# Patient Record
Sex: FEMALE | Race: WHITE | NOT HISPANIC OR LATINO | Employment: OTHER | URBAN - METROPOLITAN AREA
[De-identification: names, ages, dates, MRNs, and addresses within clinical notes are randomized per-mention and may not be internally consistent; named-entity substitution may affect disease eponyms.]

---

## 2017-01-25 ENCOUNTER — OFFICE VISIT (OUTPATIENT)
Dept: CARDIOLOGY | Facility: MEDICAL CENTER | Age: 62
End: 2017-01-25
Payer: COMMERCIAL

## 2017-01-25 VITALS
DIASTOLIC BLOOD PRESSURE: 80 MMHG | HEIGHT: 59 IN | SYSTOLIC BLOOD PRESSURE: 134 MMHG | HEART RATE: 72 BPM | BODY MASS INDEX: 29.84 KG/M2 | WEIGHT: 148 LBS

## 2017-01-25 DIAGNOSIS — Z95.1 S/P CABG X 3: ICD-10-CM

## 2017-01-25 DIAGNOSIS — I21.4 NSTEMI (NON-ST ELEVATED MYOCARDIAL INFARCTION) (HCC): ICD-10-CM

## 2017-01-25 DIAGNOSIS — I73.9 CLAUDICATION (HCC): ICD-10-CM

## 2017-01-25 DIAGNOSIS — E78.2 HYPERLIPIDEMIA, MIXED: ICD-10-CM

## 2017-01-25 DIAGNOSIS — I25.10 CORONARY ARTERIOSCLEROSIS: ICD-10-CM

## 2017-01-25 DIAGNOSIS — I10 ESSENTIAL HYPERTENSION, BENIGN: ICD-10-CM

## 2017-01-25 DIAGNOSIS — R09.89 BRUIT OF LEFT CAROTID ARTERY: ICD-10-CM

## 2017-01-25 DIAGNOSIS — I10 ESSENTIAL HYPERTENSION: ICD-10-CM

## 2017-01-25 PROCEDURE — 99214 OFFICE O/P EST MOD 30 MIN: CPT | Performed by: INTERNAL MEDICINE

## 2017-01-25 RX ORDER — ATORVASTATIN CALCIUM 40 MG/1
40 TABLET, FILM COATED ORAL DAILY
Qty: 90 TAB | Refills: 4 | Status: SHIPPED | OUTPATIENT
Start: 2017-01-25 | End: 2018-01-25 | Stop reason: SDUPTHER

## 2017-01-25 ASSESSMENT — ENCOUNTER SYMPTOMS
MYALGIAS: 0
DIZZINESS: 0
COUGH: 0
WEIGHT LOSS: 0
BRUISES/BLEEDS EASILY: 0
PALPITATIONS: 0
ABDOMINAL PAIN: 0
LOSS OF CONSCIOUSNESS: 0
NAUSEA: 0
DEPRESSION: 0
HEARTBURN: 0
NERVOUS/ANXIOUS: 0
SHORTNESS OF BREATH: 0
INSOMNIA: 0

## 2017-01-25 NOTE — MR AVS SNAPSHOT
"        Malena Dillon Michi   2017 2:00 PM   Office Visit   MRN: [id number]    Department:  [facility]   Dept Phone:  [phone]    Description:  Female : 1955   Provider:  Ana Muller M.D.           Reason for Visit     Follow-Up           Allergies as of 2017     No Known Allergies      You were diagnosed with     NSTEMI (non-ST elevated myocardial infarction) (CMS-HCC)   [516484]       Essential hypertension, benign   [401.1.ICD-9-CM]       Coronary arteriosclerosis   [396339]       Hyperlipidemia, mixed   [342348]       Bruit of left carotid artery   [038553]       Claudication (CMS-HCC)   [131881]       Essential hypertension   [5588431]       S/P CABG x 3   [045815]         Vital Signs     Blood Pressure Pulse Height Weight Body Mass Index Smoking Status    134/80 mmHg 72 1.499 m (4' 11.02\") 67.132 kg (148 lb) 29.88 kg/m2 Never Smoker       Basic Information     Date Of Birth Sex Race Ethnicity Preferred Language    1955 Female White Non- English      Your appointments     2017 10:15 AM   ECHO with ECHO [facility], [facility] EXAM 12   ECHOCARDIOLOGY [facility] ([facility])    [address] [address]   [phone]           No prep            2017 12:15 PM   CAROTID DUPLEX with VASCULAR LAB [facility], [facility] EXAM 8   NON-INVASIVE LAB [facility] ([facility])    [address] [address]   [phone]           No prep            2017  1:15 PM   LOWER EXTREMITY VENOUS DUPLEX with VASCULAR LAB [facility], [facility] EXAM 6   NON-INVASIVE LAB [facility] ([facility])    [address] [address]   [phone]           No prep              Problem List              ICD-10-CM Priority Class Noted - Resolved    NSTEMI (non-ST elevated myocardial infarction), med managment  I21.4   2014 - Present    Essential hypertension, benign I10   2014 - Present    Coronary arteriosclerosis I25.10   2014 - Present    Hyperlipidemia, mixed E78.2   2014 - Present   " S/P CABG x 3 Z95.1   11/21/2014 - Present    Bruit of left carotid artery R09.89   1/25/2017 - Present      Health Maintenance        Date Due Completion Dates    IMM DTaP/Tdap/Td Vaccine (1 - Tdap) 7/29/1973 ---    PAP SMEAR 7/29/1975 ---    COLONOSCOPY 7/29/2004 ---    IMM ZOSTER VACCINE 7/29/2014 ---    IMM INFLUENZA (1) 9/1/2016 ---    MAMMOGRAM 12/7/2017 12/7/2016, 11/11/2015, 10/3/2014, 9/27/2013, 9/21/2012, 9/15/2011, 9/7/2011, 8/31/2010, 8/28/2009, 8/28/2009, 8/22/2008, 8/22/2008, 8/17/2007, 8/17/2007, 8/16/2006, 8/16/2006, 6/27/2005            Current Immunizations     No immunizations on file.      Below and/or attached are the medications your provider expects you to take. Review all of your home medications and newly ordered medications with your provider and/or pharmacist. Follow medication instructions as directed by your provider and/or pharmacist. Please keep your medication list with you and share with your provider. Update the information when medications are discontinued, doses are changed, or new medications (including over-the-counter products) are added; and carry medication information at all times in the event of emergency situations     Allergies:  No Known Allergies          Medications  Valid as of: January 25, 2017 -  2:46 PM    Generic Name Brand Name Tablet Size Instructions for use    Aspirin (Tab) aspirin 81 MG Take 81 mg by mouth every day.        Atorvastatin Calcium (Tab) LIPITOR 40 MG Take 1 Tab by mouth every day.        Levothyroxine Sodium (Tab) SYNTHROID 88 MCG Take 88 mcg by mouth every morning before breakfast.        Metoprolol Tartrate (Tab) LOPRESSOR 25 MG Take 0.5 Tabs by mouth 2 Times a Day.        .                 Medicines prescribed today were sent to:     [facility] [address] [place] [address] - [address] [address]    Phone: [phone] Fax: [phone]    Open 24 Hours?: No    [facility] [address]  [facility]    [address] [facility] [address]    Phone: [phone] Fax: [phone]    Open 24 Hours?: No      Medication refill instructions:       If your prescription bottle indicates you have medication refills left, it is not necessary to call your provider’s office. Please contact your pharmacy and they will refill your medication.    If your prescription bottle indicates you do not have any refills left, you may request refills at any time through one of the following ways: The online Teros system (except Urgent Care), by calling your provider’s office, or by asking your pharmacy to contact your provider’s office with a refill request. Medication refills are processed only during regular business hours and may not be available until the next business day. Your provider may request additional information or to have a follow-up visit with you prior to refilling your medication.   *Please Note: Medication refills are assigned a new Rx number when refilled electronically. Your pharmacy may indicate that no refills were authorized even though a new prescription for the same medication is available at the pharmacy. Please request the medicine by name with the pharmacy before contacting your provider for a refill.        Your To Do List     Future Labs/Procedures Complete By Expires    Carotid Duplex  As directed 7/25/2017    Echocardiogram Comp w/o Cont  As directed 1/25/2018    US-OLAF MULTI LEVEL BILAT  As directed 1/25/2018         Teros Access Code: [email or web address]  Expires: 2/24/2017  2:46 PM    Teros  A secure, online tool to manage your health information     [employer]’s Teros® is a secure, online tool that connects you to your personalized health information from the privacy of your home -- day or night - making it very easy for you to manage your healthcare. Once the activation process is completed, you can even access your medical information using the Teros  dakota, which is available for free in the Apple Dakota store or Google Play store.     Rhythm NewMedia provides the following levels of access (as shown below):   My Chart Features   [facility] Primary Care Doctor [facility]  Specialists [facility]  Urgent  Care Non-[facility]  Primary Care  Doctor   Email your healthcare team securely and privately 24/7 X X X    Manage appointments: schedule your next appointment; view details of past/upcoming appointments X      Request prescription refills. X      View recent personal medical records, including lab and immunizations X X X X   View health record, including health history, allergies, medications X X X X   Read reports about your outpatient visits, procedures, consult and ER notes X X X X   See your discharge summary, which is a recap of your hospital and/or ER visit that includes your diagnosis, lab results, and care plan. X X       How to register for Rhythm NewMedia:  1. Go to  [email or web address].  2. Click on the Sign Up Now box, which takes you to the New Member Sign Up page. You will need to provide the following information:  a. Enter your Rhythm NewMedia Access Code exactly as it appears at the top of this page. (You will not need to use this code after you’ve completed the sign-up process. If you do not sign up before the expiration date, you must request a new code.)   b. Enter your date of birth.   c. Enter your home email address.   d. Click Submit, and follow the next screen’s instructions.  3. Create a Rhythm NewMedia ID. This will be your Rhythm NewMedia login ID and cannot be changed, so think of one that is secure and easy to remember.  4. Create a Rhythm NewMedia password. You can change your password at any time.  5. Enter your Password Reset Question and Answer. This can be used at a later time if you forget your password.   6. Enter your e-mail address. This allows you to receive e-mail notifications when new information is available in Rhythm NewMedia.  7. Click Sign Up. You can now view your health  information.    For assistance activating your MMIS account, call [phone]

## 2017-01-25 NOTE — PROGRESS NOTES
Subjective:   Malena Borden is a 61 y.o. female who presents today in follow-up in regards to her coronary disease with a small non-STEMI in  remedied by open-heart surgery    She is transferring her care from the cardiologist she use to follow-up with as he is no longer with us    Still working full-time at a [employer].  Secondhand smoke. No setbacks and very compliant with her medications    Past Medical History   Diagnosis Date   • Hyperlipidemia    • Thyroid disease    • CAD (coronary artery disease) 2014     Multivessel CAD   • Heart attack (CMS-HCC) 2014     NSTEMI   • Hypertension    • Indigestion      Past Surgical History   Procedure Laterality Date   • Tubal ligation     • Pr  delivery only     • Cardiac cath  2014   • Recovery  10/20/2014     Performed by Cath-Recovery Surgery at SURGERY SAME DAY [facility] ORS   • Multiple coronary artery bypass endo vein harvest  10/30/2014     Performed by Mando Vieira M.D. at SURGERY [facility] ORS     Family History   Problem Relation Age of Onset   • Lung Disease Mother    • Lung Disease Brother      History   Smoking status   • Never Smoker    Smokeless tobacco   • Never Used     No Known Allergies  Outpatient Encounter Prescriptions as of 2017   Medication Sig Dispense Refill   • metoprolol (LOPRESSOR) 25 MG Tab Take 0.5 Tabs by mouth 2 Times a Day. 90 Tab 3   • atorvastatin (LIPITOR) 40 MG Tab Take 1 Tab by mouth every day. 90 Tab 3   • aspirin 81 MG tablet Take 81 mg by mouth every day.     • levothyroxine (SYNTHROID) 88 MCG TABS Take 88 mcg by mouth every morning before breakfast.       No facility-administered encounter medications on file as of 2017.     Review of Systems   Constitutional: Negative for weight loss and malaise/fatigue.   Respiratory: Negative for cough and shortness of breath.    Cardiovascular: Negative for chest pain, palpitations and leg swelling.   Gastrointestinal: Negative for heartburn,  "nausea and abdominal pain.   Musculoskeletal: Negative for myalgias.   Neurological: Negative for dizziness and loss of consciousness.   Endo/Heme/Allergies: Does not bruise/bleed easily.   Psychiatric/Behavioral: Negative for depression. The patient is not nervous/anxious and does not have insomnia.    All other systems reviewed and are negative.       Objective:   /80 mmHg  Pulse 72  Ht 1.499 m (4' 11.02\")  Wt 67.132 kg (148 lb)  BMI 29.88 kg/m2    Physical Exam   Constitutional: She is oriented to person, place, and time. She appears well-nourished.   Eyes: EOM are normal.   Neck: No JVD present.   Cardiovascular: Normal rate and regular rhythm.    Well-healed sternal scar   Pulmonary/Chest: Breath sounds normal.   Abdominal: Bowel sounds are normal.   Neurological: She is alert and oriented to person, place, and time. No cranial nerve deficit.   Skin: Skin is warm and dry.   Psychiatric: She has a normal mood and affect. Her behavior is normal.       Assessment:     1. NSTEMI (non-ST elevated myocardial infarction), med managment   Echocardiogram Comp w/o Cont   2. Essential hypertension, benign     3. Coronary arteriosclerosis     4. Hyperlipidemia, mixed     5. Bruit of left carotid artery  Carotid Duplex   6. Claudication (CMS-[facility])  US-OLAF MULTI LEVEL BILAT       Medical Decision Making:  Today's Assessment / Status / Plan:     I spent more than 25 minutes going over her chart with her as well as her images including her echo and carotid ultrasound. She is doing exceedingly well without limitations. She is very active. She does complain of some nocturnal leg cramps that do not sound like claudication    Carotid duplex to follow her mild carotid disease, lipids at goal on a statin. Labs reviewed from June. Repeat annually    Coronary disease, echo as above. EF was normal in 2014. Aspirin beta blocker and statin    TSH was normal in the summer with her primary care    In regards to leg cramps we'll " check an OLAF    RTC 6-12 months, sooner with concerns

## 2017-01-25 NOTE — Clinical Note
[facility]   [address]  [place] [address]  Phone: [phone]  Fax: [phone]              Malena Borden  1955    Encounter Date: 2017    Ana Muller M.D.          PROGRESS NOTE:  Subjective:   Malena Borden is a 61 y.o. female who presents today in follow-up in regards to her coronary disease with a small non-STEMI in  remedied by open-heart surgery    She is transferring her care from the cardiologist she use to follow-up with as he is no longer with us    Still working full-time at a [employer].  Secondhand smoke. No setbacks and very compliant with her medications    Past Medical History   Diagnosis Date   • Hyperlipidemia    • Thyroid disease    • CAD (coronary artery disease) 2014     Multivessel CAD   • Heart attack (CMS-HCC) 2014     NSTEMI   • Hypertension    • Indigestion      Past Surgical History   Procedure Laterality Date   • Tubal ligation     • Pr  delivery only     • Cardiac cath  2014   • Recovery  10/20/2014     Performed by Cath-Recovery Surgery at SURGERY SAME DAY [facility] ORS   • Multiple coronary artery bypass endo vein harvest  10/30/2014     Performed by Mando Vieira M.D. at SURGERY [facility]     Family History   Problem Relation Age of Onset   • Lung Disease Mother    • Lung Disease Brother      History   Smoking status   • Never Smoker    Smokeless tobacco   • Never Used     No Known Allergies  Outpatient Encounter Prescriptions as of 2017   Medication Sig Dispense Refill   • metoprolol (LOPRESSOR) 25 MG Tab Take 0.5 Tabs by mouth 2 Times a Day. 90 Tab 3   • atorvastatin (LIPITOR) 40 MG Tab Take 1 Tab by mouth every day. 90 Tab 3   • aspirin 81 MG tablet Take 81 mg by mouth every day.     • levothyroxine (SYNTHROID) 88 MCG TABS Take 88 mcg by mouth every morning before breakfast.       No facility-administered encounter medications on file as of  "1/25/2017.     Review of Systems   Constitutional: Negative for weight loss and malaise/fatigue.   Respiratory: Negative for cough and shortness of breath.    Cardiovascular: Negative for chest pain, palpitations and leg swelling.   Gastrointestinal: Negative for heartburn, nausea and abdominal pain.   Musculoskeletal: Negative for myalgias.   Neurological: Negative for dizziness and loss of consciousness.   Endo/Heme/Allergies: Does not bruise/bleed easily.   Psychiatric/Behavioral: Negative for depression. The patient is not nervous/anxious and does not have insomnia.    All other systems reviewed and are negative.       Objective:   /80 mmHg  Pulse 72  Ht 1.499 m (4' 11.02\")  Wt 67.132 kg (148 lb)  BMI 29.88 kg/m2    Physical Exam   Constitutional: She is oriented to person, place, and time. She appears well-nourished.   Eyes: EOM are normal.   Neck: No JVD present.   Cardiovascular: Normal rate and regular rhythm.    Well-healed sternal scar   Pulmonary/Chest: Breath sounds normal.   Abdominal: Bowel sounds are normal.   Neurological: She is alert and oriented to person, place, and time. No cranial nerve deficit.   Skin: Skin is warm and dry.   Psychiatric: She has a normal mood and affect. Her behavior is normal.       Assessment:     1. NSTEMI (non-ST elevated myocardial infarction), med managment   Echocardiogram Comp w/o Cont   2. Essential hypertension, benign     3. Coronary arteriosclerosis     4. Hyperlipidemia, mixed     5. Bruit of left carotid artery  Carotid Duplex   6. Claudication (CMS-[facility])  US-OLAF MULTI LEVEL BILAT       Medical Decision Making:  Today's Assessment / Status / Plan:     I spent more than 25 minutes going over her chart with her as well as her images including her echo and carotid ultrasound. She is doing exceedingly well without limitations. She is very active. She does complain of some nocturnal leg cramps that do not sound like claudication    Carotid duplex to follow " her mild carotid disease, lipids at goal on a statin. Labs reviewed from June. Repeat annually    Coronary disease, echo as above. EF was normal in 2014. Aspirin beta blocker and statin    TSH was normal in the summer with her primary care    In regards to leg cramps we'll check an OLAF    RTC 6-12 months, sooner with concerns      Judy Scott M.D.  [address]  [address]  [place] [id number]  VIA Facsimile: [phone]

## 2017-07-20 ENCOUNTER — HOSPITAL ENCOUNTER (OUTPATIENT)
Dept: CARDIOLOGY | Facility: MEDICAL CENTER | Age: 62
End: 2017-07-20
Attending: INTERNAL MEDICINE | Admitting: INTERNAL MEDICINE
Payer: COMMERCIAL

## 2017-07-20 ENCOUNTER — HOSPITAL ENCOUNTER (OUTPATIENT)
Dept: RADIOLOGY | Facility: MEDICAL CENTER | Age: 62
End: 2017-07-20
Attending: INTERNAL MEDICINE
Payer: COMMERCIAL

## 2017-07-20 DIAGNOSIS — I73.9 CLAUDICATION (HCC): ICD-10-CM

## 2017-07-20 DIAGNOSIS — R09.89 BRUIT OF LEFT CAROTID ARTERY: ICD-10-CM

## 2017-07-20 DIAGNOSIS — I21.4 NSTEMI (NON-ST ELEVATED MYOCARDIAL INFARCTION) (HCC): ICD-10-CM

## 2017-07-20 PROCEDURE — 93922 UPR/L XTREMITY ART 2 LEVELS: CPT | Mod: 26 | Performed by: INTERNAL MEDICINE

## 2017-07-20 PROCEDURE — 93922 UPR/L XTREMITY ART 2 LEVELS: CPT

## 2017-07-20 PROCEDURE — 93880 EXTRACRANIAL BILAT STUDY: CPT

## 2017-07-20 PROCEDURE — 93880 EXTRACRANIAL BILAT STUDY: CPT | Mod: 26 | Performed by: INTERNAL MEDICINE

## 2017-07-20 PROCEDURE — 93306 TTE W/DOPPLER COMPLETE: CPT | Mod: 26 | Performed by: INTERNAL MEDICINE

## 2017-07-20 PROCEDURE — 93306 TTE W/DOPPLER COMPLETE: CPT

## 2017-07-21 LAB
LV EJECT FRACT  99904: 60
LV EJECT FRACT MOD 2C 99903: 59.97
LV EJECT FRACT MOD 4C 99902: 61.69
LV EJECT FRACT MOD BP 99901: 60.55

## 2017-07-21 NOTE — PROGRESS NOTES
Quick Note:    Dear Denice,    Can you please let Malena Borden know that result is ok and I will see patient as scheduled?    Thanks Serjio Galdamez.    ______

## 2017-07-24 ENCOUNTER — TELEPHONE (OUTPATIENT)
Dept: CARDIOLOGY | Facility: MEDICAL CENTER | Age: 62
End: 2017-07-24

## 2017-07-24 NOTE — TELEPHONE ENCOUNTER
----- Message from Esperanza Joyce M.D. sent at 7/21/2017  3:49 PM PDT -----  Dear Denice,    Can you please let Malena Borden know that result is ok and I will see patient as scheduled?    Thanks Serjio Galdamez.

## 2017-07-24 NOTE — TELEPHONE ENCOUNTER
Called patient, advised her of Dr. Joyce's echocardiogram and carotid ultrasound interpretations. Patient is thankful for the call.    ALBERTO SEBASTIAN

## 2018-01-25 ENCOUNTER — OFFICE VISIT (OUTPATIENT)
Dept: CARDIOLOGY | Facility: MEDICAL CENTER | Age: 63
End: 2018-01-25
Payer: COMMERCIAL

## 2018-01-25 VITALS
OXYGEN SATURATION: 97 % | DIASTOLIC BLOOD PRESSURE: 82 MMHG | BODY MASS INDEX: 29.03 KG/M2 | WEIGHT: 144 LBS | HEART RATE: 72 BPM | SYSTOLIC BLOOD PRESSURE: 138 MMHG | HEIGHT: 59 IN

## 2018-01-25 DIAGNOSIS — I10 ESSENTIAL HYPERTENSION: ICD-10-CM

## 2018-01-25 DIAGNOSIS — Z95.1 S/P CABG X 3: ICD-10-CM

## 2018-01-25 DIAGNOSIS — I21.4 NSTEMI (NON-ST ELEVATED MYOCARDIAL INFARCTION) (HCC): ICD-10-CM

## 2018-01-25 DIAGNOSIS — I25.10 CORONARY ARTERIOSCLEROSIS: ICD-10-CM

## 2018-01-25 DIAGNOSIS — E78.2 HYPERLIPIDEMIA, MIXED: ICD-10-CM

## 2018-01-25 PROBLEM — R09.89 BRUIT OF LEFT CAROTID ARTERY: Status: RESOLVED | Noted: 2017-01-25 | Resolved: 2018-01-25

## 2018-01-25 PROCEDURE — 99214 OFFICE O/P EST MOD 30 MIN: CPT | Performed by: INTERNAL MEDICINE

## 2018-01-25 RX ORDER — ATORVASTATIN CALCIUM 40 MG/1
40 TABLET, FILM COATED ORAL DAILY
Qty: 90 TAB | Refills: 4 | Status: SHIPPED | OUTPATIENT
Start: 2018-01-25 | End: 2020-06-03 | Stop reason: SDUPTHER

## 2018-01-25 ASSESSMENT — ENCOUNTER SYMPTOMS
WEIGHT LOSS: 0
LOSS OF CONSCIOUSNESS: 0
SHORTNESS OF BREATH: 0
ABDOMINAL PAIN: 0
NAUSEA: 0
BRUISES/BLEEDS EASILY: 0
DIZZINESS: 0
COUGH: 0
NERVOUS/ANXIOUS: 0
INSOMNIA: 0
HEARTBURN: 0
PALPITATIONS: 0
DEPRESSION: 0
MYALGIAS: 0

## 2018-01-25 NOTE — PROGRESS NOTES
Subjective:   Malena Borden is a 62 y.o. female who presents today in follow-up in regards to her coronary disease with a small non-STEMI in  remedied by open-heart surgery    Still working [employer]  Pleased with her new haircut  Taking all her medications  Sometimes feels like she has a short temper and gets frustrated, does not affect her job    Past Medical History:   Diagnosis Date   • CAD (coronary artery disease) 2014    Multivessel CAD   • Heart attack 2014    NSTEMI   • Hyperlipidemia    • Hypertension    • Indigestion    • Thyroid disease      Past Surgical History:   Procedure Laterality Date   • MULTIPLE CORONARY ARTERY BYPASS ENDO VEIN HARVEST  10/30/2014    Performed by Mando Vieira M.D. at SURGERY [facility] ORS   • RECOVERY  10/20/2014    Performed by Cath-Recovery Surgery at SURGERY SAME DAY [facility] ORS   • CARDIAC CATH  2014   • PB  DELIVERY ONLY     • TUBAL LIGATION       Family History   Problem Relation Age of Onset   • Lung Disease Mother    • Lung Disease Brother      History   Smoking Status   • Never Smoker   Smokeless Tobacco   • Never Used     No Known Allergies  Outpatient Encounter Prescriptions as of 2018   Medication Sig Dispense Refill   • metoprolol (LOPRESSOR) 25 MG Tab Take 0.5 Tabs by mouth 2 Times a Day. 90 Tab 4   • atorvastatin (LIPITOR) 40 MG Tab Take 1 Tab by mouth every day. 90 Tab 4   • aspirin 81 MG tablet Take 81 mg by mouth every day.     • levothyroxine (SYNTHROID) 88 MCG TABS Take 88 mcg by mouth every morning before breakfast.       No facility-administered encounter medications on file as of 2018.      Review of Systems   Constitutional: Negative for malaise/fatigue and weight loss.   Respiratory: Negative for cough and shortness of breath.    Cardiovascular: Negative for chest pain, palpitations and leg swelling.   Gastrointestinal: Negative for abdominal pain, heartburn and nausea.   Musculoskeletal: Negative  "for myalgias.   Neurological: Negative for dizziness and loss of consciousness.   Endo/Heme/Allergies: Does not bruise/bleed easily.   Psychiatric/Behavioral: Negative for depression. The patient is not nervous/anxious and does not have insomnia.    All other systems reviewed and are negative.       Objective:   /82   Pulse 72   Ht 1.499 m (4' 11\")   Wt 65.3 kg (144 lb)   SpO2 97%   BMI 29.08 kg/m²     Physical Exam   Constitutional: She is oriented to person, place, and time. She appears well-developed and well-nourished.   HENT:   Head: Normocephalic and atraumatic.   Eyes: EOM are normal. Pupils are equal, round, and reactive to light.   Neck: No JVD present. No thyromegaly present.   Cardiovascular: Normal rate and regular rhythm.    Well-healed sternal scar   Pulmonary/Chest: Breath sounds normal.   Abdominal: Bowel sounds are normal.   Musculoskeletal: She exhibits no edema or tenderness.   Neurological: She is alert and oriented to person, place, and time. No cranial nerve deficit.   Skin: Skin is warm and dry. No rash noted.   Psychiatric: She has a normal mood and affect. Her behavior is normal.       Assessment:     1. Coronary arteriosclerosis     2. NSTEMI (non-ST elevated myocardial infarction), med managment          Medical Decision Making:  Today's Assessment / Status / Plan:     Coronary disease   Status post CABG   I spent more than 25 minutes going over her chart with her as well as her images including her echo and carotid ultrasound, both of which are normal low risk late last year. She is doing exceedingly well without limitations. She is very active.   Aspirin statin beta blocker  Discussed weight loss    Leg cramping   Reviewed OLAF and duplex which is normal   She does complain of some nocturnal leg cramps that do not sound like claudication    Carotid duplex to follow her mild carotid disease, lipids at goal on a statin.   Carotid reviewed as above   Labs reviewed from June. " Repeat annually with her primary, on a statin     Mood disorder  Discussed using Zoloft, she will think about it  TSH was normal in the summer with her primary care  She will follow-up with her in March    RTC 12 months sooner with concerns medication's renewed

## 2018-03-06 DIAGNOSIS — I10 ESSENTIAL HYPERTENSION: ICD-10-CM

## 2018-03-06 DIAGNOSIS — E78.2 HYPERLIPIDEMIA, MIXED: ICD-10-CM

## 2018-03-06 DIAGNOSIS — Z95.1 S/P CABG X 3: ICD-10-CM

## 2018-03-08 RX ORDER — ATORVASTATIN CALCIUM 40 MG/1
TABLET, FILM COATED ORAL
Qty: 90 TAB | Refills: 3 | Status: SHIPPED | OUTPATIENT
Start: 2018-03-08 | End: 2019-05-09

## 2018-06-08 ENCOUNTER — TELEPHONE (OUTPATIENT)
Dept: CARDIOLOGY | Facility: MEDICAL CENTER | Age: 63
End: 2018-06-08

## 2018-06-08 NOTE — TELEPHONE ENCOUNTER
----- Message from Grace Vanegas, Med Ass't sent at 6/8/2018  3:49 PM PDT -----  [facility] would like a call back regarding the patient's statin therapy. On their end they have the patient listed allergy: statins    They would like to know if it is okay to fill the atorvastatin    Call back number is:  [phone]  Ref#[id number]    We do not have that allergy listed on our end    ==============================================================    Upon chart review, pt is been on Atorvastatin since 6/2014.     Called [facility], s/w pharmacist and notified them of the above information, they verbalizes understanding and will dispense meds as ordered

## 2019-05-09 ENCOUNTER — OFFICE VISIT (OUTPATIENT)
Dept: CARDIOLOGY | Facility: MEDICAL CENTER | Age: 64
End: 2019-05-09
Payer: COMMERCIAL

## 2019-05-09 VITALS
OXYGEN SATURATION: 96 % | HEART RATE: 70 BPM | DIASTOLIC BLOOD PRESSURE: 78 MMHG | WEIGHT: 143 LBS | BODY MASS INDEX: 28.83 KG/M2 | SYSTOLIC BLOOD PRESSURE: 126 MMHG | HEIGHT: 59 IN

## 2019-05-09 DIAGNOSIS — I25.10 CORONARY ARTERIOSCLEROSIS: ICD-10-CM

## 2019-05-09 DIAGNOSIS — I20.89 ANGINA EFFORT: ICD-10-CM

## 2019-05-09 DIAGNOSIS — I10 ESSENTIAL HYPERTENSION, BENIGN: ICD-10-CM

## 2019-05-09 PROCEDURE — 99214 OFFICE O/P EST MOD 30 MIN: CPT | Performed by: INTERNAL MEDICINE

## 2019-05-09 RX ORDER — NITROGLYCERIN 0.4 MG/1
0.4 TABLET SUBLINGUAL PRN
Qty: 25 TAB | Refills: 3 | Status: SHIPPED | OUTPATIENT
Start: 2019-05-09 | End: 2022-01-07 | Stop reason: SDUPTHER

## 2019-05-09 NOTE — PROGRESS NOTES
Chief Complaint   Patient presents with   • Coronary Artery Disease     follow up       Subjective:   Malena Borden is a 63 y.o. female who presents today in follow-up in 1 weeks or significant coronary disease with CABG , LIMA to the LAD vein graft to the obtuse marginal with a jump graft to the distal OM, vein graft to the RCA    Still working full-time, no setbacks no limitations on her feet a lot with only minimal swelling.  Compliant on her medicines    Does say now and again when she is doing stairs she gets some pressure.  This is low level but happens with exertion.  Better with rest.  Wonders about nitroglycerin.  Also wonders if her cholesterol medicine can be lowered    Past Medical History:   Diagnosis Date   • CAD (coronary artery disease) 2014    Multivessel CAD   • Heart attack (HCC) 2014    NSTEMI   • Hyperlipidemia    • Hypertension    • Indigestion    • Thyroid disease      Past Surgical History:   Procedure Laterality Date   • MULTIPLE CORONARY ARTERY BYPASS ENDO VEIN HARVEST  10/30/2014    Performed by Mando Vieira M.D. at SURGERY [facility] ORS   • RECOVERY  10/20/2014    Performed by Cath-Recovery Surgery at SURGERY SAME DAY [facility] ORS   • [facility] CARDIAC CATH  2014   • PB  DELIVERY ONLY     • TUBAL LIGATION       Family History   Problem Relation Age of Onset   • Lung Disease Mother    • Lung Disease Brother      Social History     Social History   • Marital status: Single     Spouse name: N/A   • Number of children: N/A   • Years of education: N/A     Occupational History   • Not on file.     Social History Main Topics   • Smoking status: Never Smoker   • Smokeless tobacco: Never Used   • Alcohol use No   • Drug use: No   • Sexual activity: Not on file     Other Topics Concern   • Not on file     Social History Narrative    ** Merged History Encounter **          No Known Allergies  Outpatient Encounter Prescriptions as of 2019   Medication Sig  "Dispense Refill   • nitroglycerin (NITROSTAT) 0.4 MG SL Tab Place 1 Tab under tongue as needed for Chest Pain. 25 Tab 3   • metoprolol (LOPRESSOR) 25 MG Tab Take 0.5 Tabs by mouth 2 Times a Day. 90 Tab 4   • atorvastatin (LIPITOR) 40 MG Tab Take 1 Tab by mouth every day. 90 Tab 4   • aspirin 81 MG tablet Take 81 mg by mouth every day.     • levothyroxine (SYNTHROID) 88 MCG TABS Take 88 mcg by mouth every morning before breakfast.     • [DISCONTINUED] metoprolol (LOPRESSOR) 25 MG Tab TAKE ONE-HALF TABLET BY  MOUTH TWO TIMES DAILY 90 Tab 3   • [DISCONTINUED] atorvastatin (LIPITOR) 40 MG Tab TAKE 1 TABLET BY MOUTH  EVERY DAY 90 Tab 3     No facility-administered encounter medications on file as of 5/9/2019.      ROS     Objective:   /78 (BP Location: Left arm, Patient Position: Sitting)   Pulse 70   Ht 1.499 m (4' 11\")   Wt 64.9 kg (143 lb)   SpO2 96%   BMI 28.88 kg/m²     Physical Exam    Assessment:     1. Coronary arteriosclerosis     2. Essential hypertension, benign     3. Angina effort (HCC)  NM-CARDIAC PET       Medical Decision Making:  Today's Assessment / Status / Plan:     Coronary disease  Looked at her last echo from 2017, normal function  With her angina I feel strongly that she needs her with stratification with a PET scan.  This is the ideal test as it is low radiation and good for people with significant soft tissue.  Discussed at length.  Talked about angiography or stenting if high risk features  Continue statin  Continue current regimen including aspirin    Angina  I called and nitroglycerin talked about emergency room if symptoms worsen or change  We talked about blood pressure which seems to be under good control at home  Testing as above    Hyperlipidemia  Reviewed lab work which she does annually, LDL 73  No symptoms but I did suggest diet and weight loss, she can take 20 mg of Lipitor and we will recheck in 6 months with CPK and liver function    RTC based on testing sooner if " concerns

## 2019-05-22 ENCOUNTER — TELEPHONE (OUTPATIENT)
Dept: CARDIOLOGY | Facility: MEDICAL CENTER | Age: 64
End: 2019-05-22

## 2019-05-22 ENCOUNTER — HOSPITAL ENCOUNTER (OUTPATIENT)
Dept: RADIOLOGY | Facility: MEDICAL CENTER | Age: 64
End: 2019-05-22
Attending: INTERNAL MEDICINE
Payer: COMMERCIAL

## 2019-05-22 DIAGNOSIS — I20.89 ANGINA EFFORT: ICD-10-CM

## 2019-05-22 DIAGNOSIS — I21.4 NSTEMI (NON-ST ELEVATED MYOCARDIAL INFARCTION) (HCC): ICD-10-CM

## 2019-05-22 PROCEDURE — 78492 MYOCRD IMG PET MLT RST&STRS: CPT | Mod: 26 | Performed by: INTERNAL MEDICINE

## 2019-05-22 PROCEDURE — A9555 RB82 RUBIDIUM: HCPCS

## 2019-05-22 PROCEDURE — 93018 CV STRESS TEST I&R ONLY: CPT | Performed by: INTERNAL MEDICINE

## 2019-05-22 NOTE — PROCEDURES
REFERRING PHYSICIAN:  Ana Muller MD    AGE:  63    GENDER:  Female      HEIGHT:  59 inches    WEIGHT:  143 pounds      BMI:      INDICATIONS:  CABG, chest pain.    MEDICATIONS:      PROCEDURE:  The patient reviewed and signed the acknowledgement for testing   form.  The patient was in a fasting state and was properly prepared for   testing.  An intravenous line was inserted and a flush of normal saline   followed to insure line patency.    A transmission scan was acquired for attenuation correction using the internal   Germanium sources.  The patient was then administered 20.0 mCi of   Rubidium-82.  Approximately 90 seconds after the infusion, resting imagine   were obtained with ECG-gating.  Following the resting series, the patient   administered 37.0 mg of dipyridamole over four minutes.  The blood pressure,   heart rate and ECG were monitored and recorded.  After the dipyridamole   infusion was completed, another transmission scan for attenuation correction   was obtained.  The patient was then administered 20.1 mCi of Rubidium-82.    Approximately 90 seconds after the infusion, Peak stress images were obtained   with ECG-gating.    CLINICAL RESPONSE:  Resting blood pressure was 157/85 with a heart rate of 53.    Immediately post-dipyridamole infusion the blood pressure was 135/78 with a   heart rate of 88.  After a recovery period the blood pressure was 147/80 with   a heart rate of 86.    The patient experienced dizziness, pressure all over during testing.    Aminophylline 0 mg was administered following the scan.    ELECTROCARDIOGRAPHIC FINDINGS:  Normal sinus rhythm.  Nonspecific T-wave   changes at baseline.  No ischemic ST-T wave changes were noted.    SCINTOGRAPHIC FINDINGS:  The QC data for the scan was reviewed and was within   acceptable limits.  There is a large reversible defect noted in the entire   anterior lateral wall that completely reverses in the rest images, concerning   for  ischemia.  No fixed defects are noted.    GATED WALL MOTION FINDINGS:  Normal LV systolic function.  EF 57% at rest, 69%   with stress.  Transient ischemic dilatation is noted as well, concerning for   3-vessel disease.    CONCLUSIONS AND IMPRESSIONS:  1.  No ischemic ST-T wave changes are noted.  2.  Large area of complete reversibility in the entire anterolateral wall,   concerning for ischemia.  3.  Normal left ventricular systolic function as discussed above.  4.  Transient ischemic dilatation is noted, concerning for 3-vessel disease.    Dr. Ana Unger was updated about the above findings at the time of the   read.       ____________________________________     MD DONALD Bob / TUCKER    DD:  05/22/2019 16:40:55  DT:  05/22/2019 16:58:40    D#:  [id number]  Job#:  [id number]    cc: ANA UNGER MD

## 2019-05-29 DIAGNOSIS — R94.30 ABNORMAL CARDIAC FUNCTION TEST: ICD-10-CM

## 2019-06-05 DIAGNOSIS — Z01.812 PRE-OPERATIVE LABORATORY EXAMINATION: ICD-10-CM

## 2019-06-05 DIAGNOSIS — Z01.810 PRE-OPERATIVE CARDIOVASCULAR EXAMINATION: ICD-10-CM

## 2019-06-05 LAB
ALBUMIN SERPL BCP-MCNC: 4.3 G/DL (ref 3.2–4.9)
ALBUMIN/GLOB SERPL: 1.6 G/DL
ALP SERPL-CCNC: 93 U/L (ref 30–99)
ALT SERPL-CCNC: 22 U/L (ref 2–50)
ANION GAP SERPL CALC-SCNC: 9 MMOL/L (ref 0–11.9)
APTT PPP: 31.3 SEC (ref 24.7–36)
AST SERPL-CCNC: 18 U/L (ref 12–45)
BILIRUB SERPL-MCNC: 0.6 MG/DL (ref 0.1–1.5)
BUN SERPL-MCNC: 19 MG/DL (ref 8–22)
CALCIUM SERPL-MCNC: 9.7 MG/DL (ref 8.5–10.5)
CHLORIDE SERPL-SCNC: 108 MMOL/L (ref 96–112)
CO2 SERPL-SCNC: 24 MMOL/L (ref 20–33)
CREAT SERPL-MCNC: 1.04 MG/DL (ref 0.5–1.4)
EKG IMPRESSION: NORMAL
ERYTHROCYTE [DISTWIDTH] IN BLOOD BY AUTOMATED COUNT: 41.2 FL (ref 35.9–50)
GLOBULIN SER CALC-MCNC: 2.7 G/DL (ref 1.9–3.5)
GLUCOSE SERPL-MCNC: 95 MG/DL (ref 65–99)
HCT VFR BLD AUTO: 46.6 % (ref 37–47)
HGB BLD-MCNC: 15.1 G/DL (ref 12–16)
INR PPP: 1.04 (ref 0.87–1.13)
MCH RBC QN AUTO: 29 PG (ref 27–33)
MCHC RBC AUTO-ENTMCNC: 32.4 G/DL (ref 33.6–35)
MCV RBC AUTO: 89.6 FL (ref 81.4–97.8)
PLATELET # BLD AUTO: 175 K/UL (ref 164–446)
PMV BLD AUTO: 13.5 FL (ref 9–12.9)
POTASSIUM SERPL-SCNC: 4.1 MMOL/L (ref 3.6–5.5)
PROT SERPL-MCNC: 7 G/DL (ref 6–8.2)
PROTHROMBIN TIME: 13.8 SEC (ref 12–14.6)
RBC # BLD AUTO: 5.2 M/UL (ref 4.2–5.4)
SODIUM SERPL-SCNC: 141 MMOL/L (ref 135–145)
WBC # BLD AUTO: 7.2 K/UL (ref 4.8–10.8)

## 2019-06-05 PROCEDURE — 93010 ELECTROCARDIOGRAM REPORT: CPT | Performed by: INTERNAL MEDICINE

## 2019-06-05 PROCEDURE — 80053 COMPREHEN METABOLIC PANEL: CPT

## 2019-06-05 PROCEDURE — 36415 COLL VENOUS BLD VENIPUNCTURE: CPT

## 2019-06-05 PROCEDURE — 93005 ELECTROCARDIOGRAM TRACING: CPT

## 2019-06-05 PROCEDURE — 85730 THROMBOPLASTIN TIME PARTIAL: CPT

## 2019-06-05 PROCEDURE — 85027 COMPLETE CBC AUTOMATED: CPT

## 2019-06-05 PROCEDURE — 85610 PROTHROMBIN TIME: CPT

## 2019-06-06 ENCOUNTER — HOSPITAL ENCOUNTER (OUTPATIENT)
Facility: MEDICAL CENTER | Age: 64
End: 2019-06-06
Attending: INTERNAL MEDICINE | Admitting: INTERNAL MEDICINE
Payer: COMMERCIAL

## 2019-06-06 VITALS
DIASTOLIC BLOOD PRESSURE: 79 MMHG | BODY MASS INDEX: 28.8 KG/M2 | HEART RATE: 57 BPM | HEIGHT: 59 IN | OXYGEN SATURATION: 97 % | TEMPERATURE: 97.5 F | WEIGHT: 142.86 LBS | RESPIRATION RATE: 15 BRPM | SYSTOLIC BLOOD PRESSURE: 154 MMHG

## 2019-06-06 DIAGNOSIS — R94.30 ABNORMAL CARDIAC FUNCTION TEST: ICD-10-CM

## 2019-06-06 PROCEDURE — 700117 HCHG RX CONTRAST REV CODE 255: Performed by: INTERNAL MEDICINE

## 2019-06-06 PROCEDURE — 160002 HCHG RECOVERY MINUTES (STAT)

## 2019-06-06 PROCEDURE — 93459 L HRT ART/GRFT ANGIO: CPT | Mod: 26 | Performed by: INTERNAL MEDICINE

## 2019-06-06 PROCEDURE — 99152 MOD SED SAME PHYS/QHP 5/>YRS: CPT | Performed by: INTERNAL MEDICINE

## 2019-06-06 PROCEDURE — 700111 HCHG RX REV CODE 636 W/ 250 OVERRIDE (IP)

## 2019-06-06 PROCEDURE — 700105 HCHG RX REV CODE 258: Performed by: INTERNAL MEDICINE

## 2019-06-06 PROCEDURE — 700101 HCHG RX REV CODE 250

## 2019-06-06 RX ORDER — SODIUM CHLORIDE 9 MG/ML
1000 INJECTION, SOLUTION INTRAVENOUS
Status: DISCONTINUED | OUTPATIENT
Start: 2019-06-06 | End: 2019-06-06 | Stop reason: HOSPADM

## 2019-06-06 RX ORDER — VERAPAMIL HYDROCHLORIDE 2.5 MG/ML
INJECTION, SOLUTION INTRAVENOUS
Status: COMPLETED
Start: 2019-06-06 | End: 2019-06-06

## 2019-06-06 RX ORDER — MIDAZOLAM HYDROCHLORIDE 1 MG/ML
INJECTION INTRAMUSCULAR; INTRAVENOUS
Status: COMPLETED
Start: 2019-06-06 | End: 2019-06-06

## 2019-06-06 RX ORDER — LIDOCAINE HYDROCHLORIDE 20 MG/ML
INJECTION, SOLUTION INFILTRATION; PERINEURAL
Status: COMPLETED
Start: 2019-06-06 | End: 2019-06-06

## 2019-06-06 RX ORDER — HEPARIN SODIUM,PORCINE 1000/ML
VIAL (ML) INJECTION
Status: COMPLETED
Start: 2019-06-06 | End: 2019-06-06

## 2019-06-06 RX ORDER — METOPROLOL TARTRATE 1 MG/ML
INJECTION, SOLUTION INTRAVENOUS
Status: COMPLETED
Start: 2019-06-06 | End: 2019-06-06

## 2019-06-06 RX ADMIN — VERAPAMIL HYDROCHLORIDE 2.5 MG: 2.5 INJECTION, SOLUTION INTRAVENOUS at 07:30

## 2019-06-06 RX ADMIN — FENTANYL CITRATE 75 MCG: 50 INJECTION INTRAMUSCULAR; INTRAVENOUS at 08:07

## 2019-06-06 RX ADMIN — METOPROLOL TARTRATE 5 MG: 5 INJECTION, SOLUTION INTRAVENOUS at 08:28

## 2019-06-06 RX ADMIN — IOHEXOL 65 ML: 350 INJECTION, SOLUTION INTRAVENOUS at 08:12

## 2019-06-06 RX ADMIN — MIDAZOLAM HYDROCHLORIDE 2 MG: 1 INJECTION, SOLUTION INTRAMUSCULAR; INTRAVENOUS at 08:07

## 2019-06-06 RX ADMIN — LIDOCAINE HYDROCHLORIDE: 20 INJECTION, SOLUTION INFILTRATION; PERINEURAL at 07:52

## 2019-06-06 RX ADMIN — NITROGLYCERIN 10 ML: 20 INJECTION INTRAVENOUS at 07:52

## 2019-06-06 RX ADMIN — HEPARIN SODIUM: 1000 INJECTION, SOLUTION INTRAVENOUS; SUBCUTANEOUS at 07:52

## 2019-06-06 RX ADMIN — SODIUM CHLORIDE 1000 ML: 9 INJECTION, SOLUTION INTRAVENOUS at 07:01

## 2019-06-06 RX ADMIN — HEPARIN SODIUM 2000 UNITS: 1000 INJECTION, SOLUTION INTRAVENOUS; SUBCUTANEOUS at 07:53

## 2019-06-06 NOTE — OR NURSING
1230: DC instructions given. Questions answered. Friend at bedside.R groin  soft,CDI. No c/o pain or nausea. Patient wide awake. VSS. Patient met criteria for discharge.

## 2019-06-06 NOTE — OR NURSING
0815   REPORT RECEIVED FROM CATH LAB,  S/P CLEAN HEART CATH VIA RIGHT GROIN.  SHEATH PULLED AND HEMOSTASIS ACHIEVED.    0840   PT ARRIVED FROM CATH LAB,  S/P LEFT HEART CATH VIA RIGHT GROIN.  R GROIN SITE IS CLEAR WITH DRESSING INTACT.  DENIES ANY PAIN.    0900   R GROIN SITE CHECKED AND CLEAR.      0915   R GROIN SITE CHECKED AND CLEAR.  PT RESTING COMFORTABLY.  TAKING SIPS OF WATER AND ICE.    0930   R GROIN SITE CHECKED AND CLEAR.    1030  R GROIN SITE CHECKED AND CLEAR.    1130  PT RESTING COMFORTABLY.  RIGHT GROIN SITE CHECKED AND CLEAR.

## 2019-06-06 NOTE — PROCEDURES
DATE OF SERVICE:  06/06/2019    REFERRING PHYSICIAN:  Ana Muller MD    PROCEDURES:  1.  Left heart catheterization.  2.  Coronary angiography.  3.  Graft angiography.  4.  Left ventriculogram.  5.  Monitored conscious sedation.    PREPROCEDURE DIAGNOSES:  1.  Chest pain.  2.  Abnormal PET scan.    POSTPROCEDURE DIAGNOSES:  1.  Native 3-vessel coronary artery disease with patent left internal mammary   artery to the left anterior descending artery, patent saphenous vein graft to   an obtuse marginal branch, patent saphenous vein graft to the distal right   coronary artery.  2.  Mildly reduced left ventricular systolic function with ejection fraction of 40%.  3.  Mildly elevated left ventricular end-diastolic pressure.    INDICATION:  The patient is a 63-year-old female with past medical history   significant for coronary artery disease with prior 4-vessel coronary artery   bypass graft with graft of left internal mammary artery to the left anterior   descending artery, saphenous vein graft to the obtuse marginal branch,   sequentially to second obtuse marginal branch, saphenous vein graft to the   right coronary artery in 2014.  She has been experiencing chest pain and   underwent a cardiac PET scan, which showed anterior lateral ischemia.  She was   scheduled for cardiac catheterization.    DESCRIPTION OF PROCEDURE:  After informed consent was signed by the patient,   the patient was brought to the cardiac catheterization laboratory where she   was prepped and draped in usual sterile manner.  The right inguinal area was   then anesthetized with 2% Xylocaine.  A 4-Syrian sheath was inserted into the   right femoral artery using a modified Seldinger technique under ultrasound   guidance.  A 4-Syrian pigtail catheter was positioned into the left ventricle.    Left ventriculography was performed.  This was exchanged for a 4-Syrian JL4   catheter, which was positioned in the left main coronary artery.  Coronary    angiography was performed.  This catheter was exchanged for a 4-Bulgarian JR4   catheter, which was positioned in the right coronary artery.  Coronary   angiography was performed.  The catheter was directed into the left subclavian   artery and was used to engage the left internal mammary artery.  Graft   angiography was performed.  The catheter was exchanged for a multipurpose   catheter, which was positioned into the saphenous vein graft to the right   coronary artery.  Graft angiography was performed.  The patient tolerated the   procedure well.  At the end of the procedure, all catheters and sheaths were   removed.  The patient was then transferred to PPU in stable condition.    HEMODYNAMIC DATA:  Hemodynamic data shows aortic pressures of 140/80 with mean   of 100 mmHg and /0 with LVEDP of 60 mmHg.    AORTIC VALVE:  There was no significant gradient noted.    LEFT VENTRICULOGRAM:  A 10 mL of contrast was delivered for 3 seconds.    Ejection fraction was estimated to be 40%.    ANGIOGRAM:  1.  Left main coronary artery:  Left main coronary artery is a short moderate   caliber vessel free of disease.  2.  Left anterior descending artery:  Left anterior descending artery is a   long moderate caliber vessel, which wraps around the apex.  Proximal portion   of the vessel, it is occluded.  Prior to the occlusion, there is a very small   caliber diagonal branch with ostial 50-60% stenosis.  The mid to distal   portion of the left anterior descending artery including a small-to-moderate   diagonal branch filled via patent left internal mammary artery graft.  3.  Left circumflex artery:  Left circumflex artery is a nondominant moderate   caliber vessel with ostial occluded first and second obtuse marginal branch.    The second obtuse marginal branch filled via patent saphenous vein graft.  3.  Right coronary artery:  Right coronary artery is a dominant moderate   caliber vessel, which is occluded at the mid  portion.  Distally, a moderate   caliber posterior descending artery fills via patent saphenous vein graft,   left internal mammary artery to left anterior descending artery patent,   saphenous vein graft sequentially to first and second obtuse marginal branch   patent with loss of the entrance into the first obtuse marginal branch,   saphenous vein graft to the distal right coronary artery patent.    IMPRESSION:  1.  Native 3-vessel coronary artery disease with patent left internal mammary   artery to the left anterior descending artery, patent saphenous vein graft to   an obtuse marginal branch, patent saphenous vein graft to the distal right   coronary artery.  2.  Mildly reduced left ventricular systolic function with ejection fraction of 40%.  3.  Mildly elevated left ventricular end diastolic pressure.    RECOMMENDATIONS:  Recommend medical therapy.    SEDATION TIME: The patient's sedation was managed by myself with continuous   face to face time with the patient for 15 minutes from 07:40 to 07:55.       ____________________________________     MD KADEN BARAHONA / TUCKER    DD:  06/06/2019 08:14:56  DT:  06/06/2019 08:45:15    D#:  [id number]  Job#:  [id number]

## 2019-06-06 NOTE — DISCHARGE INSTRUCTIONS
ACTIVITY: Rest and take it easy for the first 24 hours.  A responsible adult is recommended to remain with you during that time.  It is normal to feel sleepy.  We encourage you to not do anything that requires balance, judgment or coordination.    MILD FLU-LIKE SYMPTOMS ARE NORMAL. YOU MAY EXPERIENCE GENERALIZED MUSCLE ACHES, THROAT IRRITATION, HEADACHE AND/OR SOME NAUSEA.    FOR 24 HOURS DO NOT:  Drive, operate machinery or run household appliances.  Drink beer or alcoholic beverages.   Make important decisions or sign legal documents.      DIET: To avoid nausea, slowly advance diet as tolerated, avoiding spicy or greasy foods for the first day.  Add more substantial food to your diet according to your physician's instructions.  Babies can be fed formula or breast milk as soon as they are hungry.  INCREASE FLUIDS AND FIBER TO AVOID CONSTIPATION.    SURGICAL DRESSING/BATHING:    Keep the dressing clean and dry for 24 hours.  May remove dressing tomorrow  and can shower.  Do not submerge site under water for 1 week.  No heavy lifting and limit movement for 2 days.  Return to work in 1 week.       FOLLOW-UP APPOINTMENT:  A follow-up appointment should be arranged with your doctor ; call to schedule.    You should CALL YOUR PHYSICIAN if you develop:  Fever greater than 101 degrees F.  Pain not relieved by medication, or persistent nausea or vomiting.  Excessive bleeding (blood soaking through dressing) or unexpected drainage from the wound.  Extreme redness or swelling around the incision site, drainage of pus or foul smelling drainage.  Inability to urinate or empty your bladder within 8 hours.  Problems with breathing or chest pain.    You should call 911 if you develop problems with breathing or chest pain.      If you are unable to contact your doctor or surgical center, you should go to the nearest emergency room or urgent care center.      Physician's telephone #: [phone]    If any questions arise, call your  doctor.  If your doctor is not available, please feel free to call the Surgical Center at [phone].  The Center is open Monday through Friday from 7AM to 7PM.  You can also call the HEALTH HOTLINE open 24 hours/day, 7 days/week and speak to a nurse at [phone], or toll free at [phone].    A registered nurse may call you a few days after your surgery to see how you are doing after your procedure.    MEDICATIONS: Resume taking daily medication.  Take prescribed pain medication with food.  If no medication is prescribed, you may take non-aspirin pain medication if needed.  PAIN MEDICATION CAN BE VERY CONSTIPATING.  Take a stool softener or laxative such as senokot, pericolace, or milk of magnesia if needed.      If your physician has prescribed pain medication that includes Acetaminophen (Tylenol), do not take additional Acetaminophen (Tylenol) while taking the prescribed medication.    Depression / Suicide Risk    As you are discharged from this [facility] facility, it is important to learn how to keep safe from harming yourself.    Recognize the warning signs:  · Abrupt changes in personality, positive or negative- including increase in energy   · Giving away possessions  · Change in eating patterns- significant weight changes-  positive or negative  · Change in sleeping patterns- unable to sleep or sleeping all the time   · Unwillingness or inability to communicate  · Depression  · Unusual sadness, discouragement and loneliness  · Talk of wanting to die  · Neglect of personal appearance   · Rebelliousness- reckless behavior  · Withdrawal from people/activities they love  · Confusion- inability to concentrate     If you or a loved one observes any of these behaviors or has concerns about self-harm, here's what you can do:  · Talk about it- your feelings and reasons for harming yourself  · Remove any means that you might use to hurt yourself (examples: pills, rope, extension cords, firearm)  · Get  "professional help from the community (Mental Health, Substance Abuse, psychological counseling)  · Do not be alone:Call your Safe Contact- someone whom you trust who will be there for you.  · Call your local CRISIS HOTLINE 503-6389 or 725-577-8953  · Call your local Children's Mobile Crisis Response Team [place] (396) 614-1540 or www.Intelen  · Call the toll free National Suicide Prevention Hotlines   · National Suicide Prevention Lifeline 563-040-VFLS (2363)  · [employer] [facility] [employer] 800-SUICIDE (727-9563)                      Post Angiogram Groin Care Instructions     INSTRUCTIONS  2. Examine (look and feel) the site of your incision site TODAY so you can recognize changes that should be called to your doctor (see below).  3. Avoid straining either by lifting or pulling objects for 4-5 days. Avoid lifting over 5 pounds.   4. For at least 72 hours, if you should sneeze or cough, please hold pressure over your groin area.  5. If you should begin to have oozing from the catheterization site, please hold firm pressure and call your doctor's office immediately.  6. If profuse bleeding occurs from the catheterization site, hold firm pressure and call \"[phone]\" immediately for assistance.  7. Remove bandage after 24 hours.     ACTIVITY  2. Limit activity as instructed by your doctor.  3. No driving or very limited driving with frequent stops for one week.   4. If you must take a long car ride, stop every hour and walk around the car.   5. Warm showers or baths are permitted after the bandage is removed. Avoid hot showers, baths, hot tubs, and swimming for one week.    PLEASE CALL YOUR DOCTOR IF:  1. Temperature elevation occurs.  2. Catheterization site becomes reddened or begins to drain.   3. Bruising appears to be new or not resolving. The bruise may move down your leg. This is normal.  4. The small round lump in the groin increases in size.  5. Any leg numbness, aching, or discomfort " (immediately).  6. Increasing discomfort in the leg at the insertion site.  7. Chest pains, even if relieved by Nitroglycerin.    MISCELLANEOUS INSTRUCTIONS  1. Bruising may occur as a result of heart catheterization. Some of the discoloration may travel down the leg, going from blue to green in color.  2. A small round lump under the catheterization site will remain for up to six weeks.  3. If any questions arise call your physician's office. You can also call the HEALTH HOTLINE open 24 hours/day, 7 days/week and speak to a nurse at [phone], or toll free at [phone].   4. You should call 911 if you develop problems with breathing or chest pain.      I acknowledge receipt and understanding of these Home Care instructions.

## 2019-09-28 ENCOUNTER — HOSPITAL ENCOUNTER (EMERGENCY)
Facility: MEDICAL CENTER | Age: 64
End: 2019-09-28
Attending: EMERGENCY MEDICINE
Payer: COMMERCIAL

## 2019-09-28 VITALS
HEART RATE: 74 BPM | SYSTOLIC BLOOD PRESSURE: 148 MMHG | RESPIRATION RATE: 16 BRPM | DIASTOLIC BLOOD PRESSURE: 74 MMHG | BODY MASS INDEX: 28.22 KG/M2 | WEIGHT: 140 LBS | HEIGHT: 59 IN | TEMPERATURE: 99 F | OXYGEN SATURATION: 95 %

## 2019-09-28 DIAGNOSIS — M54.17 LUMBOSACRAL RADICULOPATHY: ICD-10-CM

## 2019-09-28 PROCEDURE — 99283 EMERGENCY DEPT VISIT LOW MDM: CPT

## 2019-09-28 PROCEDURE — 96372 THER/PROPH/DIAG INJ SC/IM: CPT

## 2019-09-28 PROCEDURE — 700111 HCHG RX REV CODE 636 W/ 250 OVERRIDE (IP): Performed by: EMERGENCY MEDICINE

## 2019-09-28 RX ORDER — KETOROLAC TROMETHAMINE 30 MG/ML
30 INJECTION, SOLUTION INTRAMUSCULAR; INTRAVENOUS ONCE
Status: COMPLETED | OUTPATIENT
Start: 2019-09-28 | End: 2019-09-28

## 2019-09-28 RX ORDER — METHYLPREDNISOLONE 4 MG/1
TABLET ORAL
Qty: 1 KIT | Refills: 0 | Status: SHIPPED | OUTPATIENT
Start: 2019-09-28 | End: 2020-06-03

## 2019-09-28 RX ADMIN — KETOROLAC TROMETHAMINE 30 MG: 30 INJECTION, SOLUTION INTRAMUSCULAR at 07:21

## 2019-09-28 NOTE — ED TRIAGE NOTES
"Chief Complaint   Patient presents with   • Low Back Pain     sciatic pain, radiates down L side, since last night     BP (!) 167/94   Pulse (!) 101   Temp 37.2 °C (99 °F) (Temporal)   Resp 16   Ht 1.499 m (4' 11\")   Wt 63.5 kg (140 lb)   SpO2 94%     Pt comes to triage in a wheelchair with above complaints. Pt reports sciatic pain only once many years ago, denies any recent trauma, heavy lifting or twisting.   "

## 2019-09-28 NOTE — ED NOTES
Pt discharged with instructions and script. Pt verbalizes the understanding of instructions. Pt wheeled out of ER without any difficulty.

## 2019-09-28 NOTE — ED PROVIDER NOTES
ED Provider Note      CHIEF COMPLAINT  Chief Complaint   Patient presents with   • Low Back Pain     sciatic pain, radiates down L side, since last night       HPI  Malena Borden is a 64 y.o. female who presents to the emergency room with left-sided low back pain.  Radiates down the back of the left leg.  It started 2 days ago.  Noticed a twinge in her back after lifting some groceries out of her car.  Pain slightly worse yesterday and then last night much worse.  Feels some tingling in her left foot.  No weakness.  No bowel or bladder dysfunction or saddle anesthesia.  No abdominal pain nausea or vomiting.  Has not had chest pain shortness of breath.  No fevers chills injection drug use.  She has had sciatica in the past and this feels similar.        REVIEW OF SYSTEMS  Denies any weakness in the lower extremities. No bowel or bladder incontinence or saddle anesthesia. No fevers or chills. No injection drug use or IV drug abuse. No abdominal pain, nausea or vomiting. No dysuria, hematuria or flank pain.    PAST MEDICAL HISTORY  Past Medical History:   Diagnosis Date   • CAD (coronary artery disease) June 11 2014    Multivessel CAD   • Heart attack (HCC) June 11, 2014    NSTEMI   • High cholesterol    • Hyperlipidemia    • Hypertension    • Indigestion    • Thyroid disease        FAMILY HISTORY  Family History   Problem Relation Age of Onset   • Lung Disease Mother    • Lung Disease Brother        SOCIAL HISTORY  Social History     Tobacco Use   • Smoking status: Never Smoker   • Smokeless tobacco: Never Used   Substance Use Topics   • Alcohol use: No   • Drug use: No       SURGICAL HISTORY  Past Surgical History:   Procedure Laterality Date   • MULTIPLE CORONARY ARTERY BYPASS ENDO VEIN HARVEST  10/30/2014    Performed by Mando Vieira M.D. at SURGERY [facility] ORS   • RECOVERY  10/20/2014    Performed by Cath-Recovery Surgery at SURGERY SAME DAY [facility] ORS   • JIM CARDIAC CATH  June 11, 2014   • PB  " DELIVERY ONLY     • TUBAL LIGATION         CURRENT MEDICATIONS  Home Medications     Reviewed by Katia Macias R.N. (Registered Nurse) on 19 at 0622  Med List Status: <None>   Medication Last Dose Status   aspirin 81 MG tablet  Active   atorvastatin (LIPITOR) 40 MG Tab  Active   levothyroxine (SYNTHROID) 88 MCG TABS  Active   metoprolol (LOPRESSOR) 25 MG Tab  Active   nitroglycerin (NITROSTAT) 0.4 MG SL Tab  Active   vitamin D (CHOLECALCIFEROL) 1000 UNIT Tab  Active                ALLERGIES  No Known Allergies      PHYSICAL EXAM  VITAL SIGNS: /67   Pulse 77   Temp 37.2 °C (99 °F) (Temporal)   Resp 16   Ht 1.499 m (4' 11\")   Wt 63.5 kg (140 lb)   SpO2 94%   BMI 28.28 kg/m²   Constitutional: Well developed, Well nourished, No acute distress, Non-toxic appearance. Complaining of pain  Neck: Grossly normal range of motion  Cardiovascular: Normal heart rate   Thorax & Lungs: No respiratory distress  Abdomen: Bowel sounds normal, soft, non-distended, nontender, no masses.  Skin: Warm, Dry, No rash.   Back: Left lower lumbar and buttock tenderness, no step-off or deformity  Extremities: No clubbing, cyanosis, edema, no Homans or cords   Neurologic: Grossly normal cranial nerves, 5 out of 5 EHL, FHL, gastrocnemius, tibialis anterior. 2+ DTRs at the patellas bilaterally. Normal sensory throughout.      COURSE & MEDICAL DECISION MAKING    Patient presents to the emergency department with left-sided low back pain and radicular symptoms.  There are no red flags.  She is treated with Toradol IM.  She will apply topical NSAID cream that she has at home.  I have given a prescription for Medrol Dosepak.  Advised Tylenol in addition of this.  I have advised recheck with primary doctor this week.  She was advised that she should return to the ER for any fevers, weakness, uncontrolled pain or concern.    FINAL IMPRESSION  1.  Acute left-sided lumbar radiculopathy        This dictation was created " using voice recognition software. The accuracy of the dictation is limited to the abilities of the software.  The nursing notes were reviewed and certain aspects of this information were incorporated into this note.    Electronically signed by: Tony Brunson, 9/28/2019 7:41 AM

## 2019-10-09 ENCOUNTER — TELEPHONE (OUTPATIENT)
Dept: CARDIOLOGY | Facility: MEDICAL CENTER | Age: 64
End: 2019-10-09

## 2019-10-09 DIAGNOSIS — E78.2 HYPERLIPIDEMIA, MIXED: ICD-10-CM

## 2019-10-09 NOTE — TELEPHONE ENCOUNTER
From: Ana Muller M.D.   Sent: 10/9/2019   To: Alisson Rahman R.N.   Subject: ft                                               Can you please call patient or send her slip for lipids, CPK and LFTs thank you so much       Left voicemail for patient advising that lab order will be mailed to the address on the chart.

## 2019-11-15 ENCOUNTER — TELEPHONE (OUTPATIENT)
Dept: CARDIOLOGY | Facility: MEDICAL CENTER | Age: 64
End: 2019-11-15

## 2019-11-15 LAB
ALBUMIN SERPL-MCNC: 4.9 G/DL (ref 3.6–4.8)
ALP SERPL-CCNC: 108 IU/L (ref 39–117)
ALT SERPL-CCNC: 30 IU/L (ref 0–32)
AST SERPL-CCNC: 25 IU/L (ref 0–40)
BILIRUB DIRECT SERPL-MCNC: 0.22 MG/DL (ref 0–0.4)
BILIRUB SERPL-MCNC: 0.9 MG/DL (ref 0–1.2)
CHOLEST SERPL-MCNC: 177 MG/DL (ref 100–199)
CK SERPL-CCNC: 72 U/L (ref 24–173)
HDLC SERPL-MCNC: 52 MG/DL
LABORATORY COMMENT REPORT: NORMAL
LDLC SERPL CALC-MCNC: 96 MG/DL (ref 0–99)
PROT SERPL-MCNC: 7.4 G/DL (ref 6–8.5)
TRIGL SERPL-MCNC: 146 MG/DL (ref 0–149)
VLDLC SERPL CALC-MCNC: 29 MG/DL (ref 5–40)

## 2019-11-15 NOTE — TELEPHONE ENCOUNTER
Result Notes for LIPID PANEL     Notes recorded by Ana Muller M.D. on 11/15/2019 at 8:28 AM PST  Labs normal and reassuring, no changes today     Attempted to reach patient.  Left voicemail message requesting return phone call.

## 2020-01-10 ENCOUNTER — OFFICE VISIT (OUTPATIENT)
Dept: CARDIOLOGY | Facility: MEDICAL CENTER | Age: 65
End: 2020-01-10
Payer: COMMERCIAL

## 2020-01-10 VITALS
DIASTOLIC BLOOD PRESSURE: 88 MMHG | HEIGHT: 59 IN | WEIGHT: 139.99 LBS | SYSTOLIC BLOOD PRESSURE: 128 MMHG | BODY MASS INDEX: 28.22 KG/M2 | HEART RATE: 74 BPM | OXYGEN SATURATION: 98 %

## 2020-01-10 DIAGNOSIS — I10 ESSENTIAL HYPERTENSION, BENIGN: ICD-10-CM

## 2020-01-10 DIAGNOSIS — Z95.1 S/P CABG X 3: ICD-10-CM

## 2020-01-10 DIAGNOSIS — I25.10 CORONARY ARTERY DISEASE INVOLVING NATIVE CORONARY ARTERY OF NATIVE HEART WITHOUT ANGINA PECTORIS: ICD-10-CM

## 2020-01-10 DIAGNOSIS — E78.2 HYPERLIPIDEMIA, MIXED: ICD-10-CM

## 2020-01-10 PROCEDURE — 99204 OFFICE O/P NEW MOD 45 MIN: CPT | Performed by: INTERNAL MEDICINE

## 2020-01-10 RX ORDER — ATORVASTATIN CALCIUM 40 MG/1
TABLET, FILM COATED ORAL
COMMUNITY
Start: 2019-11-21 | End: 2020-06-03

## 2020-01-10 RX ORDER — LEVOTHYROXINE SODIUM 88 UG/1
TABLET ORAL
COMMUNITY
Start: 2019-12-25 | End: 2022-01-07 | Stop reason: SDUPTHER

## 2020-01-10 ASSESSMENT — ENCOUNTER SYMPTOMS
DIZZINESS: 0
HEADACHES: 0
DEPRESSION: 0
SHORTNESS OF BREATH: 0
CHILLS: 0
ABDOMINAL PAIN: 0
MUSCULOSKELETAL NEGATIVE: 1
PSYCHIATRIC NEGATIVE: 1
BRUISES/BLEEDS EASILY: 0
WEIGHT LOSS: 0
NERVOUS/ANXIOUS: 0
NAUSEA: 0
FOCAL WEAKNESS: 0
CLAUDICATION: 0
EYES NEGATIVE: 1
NEUROLOGICAL NEGATIVE: 1
VOMITING: 0
RESPIRATORY NEGATIVE: 1
WEAKNESS: 0
FEVER: 0
MYALGIAS: 0
CONSTITUTIONAL NEGATIVE: 1
PALPITATIONS: 0
BLURRED VISION: 0
DOUBLE VISION: 0
GASTROINTESTINAL NEGATIVE: 1
CARDIOVASCULAR NEGATIVE: 1
COUGH: 0

## 2020-01-10 NOTE — PROGRESS NOTES
Chief Complaint   Patient presents with   • Coronary Artery Disease       Subjective:   Naomi Borden is a 64 y.o. female who presents today for follow up of coronary artery disease with prior coronary artery bypass graft surgery.    Since the patient's last visit on 19 with Ana Sinclair, she has been doing well clinically. She denies chest pain, shortness of breath, palpitations, nausea/vomiting or diaphoresis.    Past Medical History:   Diagnosis Date   • CAD (coronary artery disease) 2014    Multivessel CAD   • Heart attack (HCC) 2014    NSTEMI   • High cholesterol    • Hyperlipidemia    • Hypertension    • Indigestion    • Thyroid disease      Past Surgical History:   Procedure Laterality Date   • MULTIPLE CORONARY ARTERY BYPASS ENDO VEIN HARVEST  10/30/2014    Performed by Mando Vieira M.D. at SURGERY [facility] ORS   • RECOVERY  10/20/2014    Performed by Cath-Recovery Surgery at SURGERY SAME DAY [facility] ORS   • ZZZ CARDIAC CATH  2014   • PB  DELIVERY ONLY     • TUBAL LIGATION       Family History   Problem Relation Age of Onset   • Lung Disease Mother    • Lung Disease Brother      Social History     Socioeconomic History   • Marital status: Single     Spouse name: Not on file   • Number of children: Not on file   • Years of education: Not on file   • Highest education level: Not on file   Occupational History   • Not on file   Social Needs   • Financial resource strain: Not on file   • Food insecurity:     Worry: Not on file     Inability: Not on file   • Transportation needs:     Medical: Not on file     Non-medical: Not on file   Tobacco Use   • Smoking status: Never Smoker   • Smokeless tobacco: Never Used   Substance and Sexual Activity   • Alcohol use: No   • Drug use: No   • Sexual activity: Not on file   Lifestyle   • Physical activity:     Days per week: Not on file     Minutes per session: Not on file   • Stress: Not on file   Relationships    • Social connections:     Talks on phone: Not on file     Gets together: Not on file     Attends Quaker service: Not on file     Active member of club or organization: Not on file     Attends meetings of clubs or organizations: Not on file     Relationship status: Not on file   • Intimate partner violence:     Fear of current or ex partner: Not on file     Emotionally abused: Not on file     Physically abused: Not on file     Forced sexual activity: Not on file   Other Topics Concern   • Not on file   Social History Narrative    ** Merged History Encounter **          No Known Allergies     (Medications reviewed.)  Outpatient Encounter Medications as of 1/10/2020   Medication Sig Dispense Refill   • vitamin D (CHOLECALCIFEROL) 1000 UNIT Tab Take 1,000 Units by mouth every day.     • nitroglycerin (NITROSTAT) 0.4 MG SL Tab Place 1 Tab under tongue as needed for Chest Pain. 25 Tab 3   • metoprolol (LOPRESSOR) 25 MG Tab Take 0.5 Tabs by mouth 2 Times a Day. 90 Tab 4   • atorvastatin (LIPITOR) 40 MG Tab Take 1 Tab by mouth every day. 90 Tab 4   • aspirin 81 MG tablet Take 81 mg by mouth every day.     • levothyroxine (SYNTHROID) 88 MCG TABS Take 88 mcg by mouth every morning before breakfast.     • atorvastatin (LIPITOR) 40 MG Tab      • levothyroxine (SYNTHROID) 88 MCG Tab      • methylPREDNISolone (MEDROL DOSEPAK) 4 MG Tablet Therapy Pack Take as directed (Patient not taking: Reported on 1/10/2020) 1 Kit 0     No facility-administered encounter medications on file as of 1/10/2020.      Review of Systems   Constitutional: Negative.  Negative for chills, fever, malaise/fatigue and weight loss.   HENT: Negative.  Negative for hearing loss.    Eyes: Negative.  Negative for blurred vision and double vision.   Respiratory: Negative.  Negative for cough and shortness of breath.    Cardiovascular: Negative.  Negative for chest pain, palpitations, claudication and leg swelling.   Gastrointestinal: Negative.  Negative for  "abdominal pain, nausea and vomiting.   Genitourinary: Negative.  Negative for dysuria and urgency.   Musculoskeletal: Negative.  Negative for joint pain and myalgias.   Skin: Negative.  Negative for itching and rash.   Neurological: Negative.  Negative for dizziness, focal weakness, weakness and headaches.   Endo/Heme/Allergies: Negative.  Does not bruise/bleed easily.   Psychiatric/Behavioral: Negative.  Negative for depression. The patient is not nervous/anxious.         Objective:   /88 (BP Location: Right arm, Patient Position: Sitting, BP Cuff Size: Adult)   Pulse 74   Ht 1.499 m (4' 11\")   Wt 63.5 kg (139 lb 15.9 oz)   SpO2 98%   BMI 28.27 kg/m²     Physical Exam   Constitutional: She is oriented to person, place, and time. She appears well-developed and well-nourished.   HENT:   Head: Normocephalic and atraumatic.   Eyes: EOM are normal.   Neck: No JVD present.   Cardiovascular: Normal rate, regular rhythm and normal heart sounds.   Pulmonary/Chest: Effort normal and breath sounds normal.   Abdominal: Soft. Bowel sounds are normal.   No hepatosplenomegaly.   Musculoskeletal: Normal range of motion.   Lymphadenopathy:     She has no cervical adenopathy.   Neurological: She is alert and oriented to person, place, and time.   Skin: Skin is warm and dry.   Psychiatric: She has a normal mood and affect.     CARDIAC STUDIES/PROCEDURES:    ANKLE BRACHIAL INDEX (07/20/17)  No evidence of arterial insufficiency.   (study result reviewed)    CARDIAC CATHETERIZATION CONCLUSIONS (06/06/19)  1.  Native 3-vessel coronary artery disease with patent left internal mammary   artery to the left anterior descending artery, patent saphenous vein graft to   an obtuse marginal branch, patent saphenous vein graft to the distal right   coronary artery.  2.  Mildly reduced left ventricular systolic function with ejection fraction of 40%.  3.  Mildly elevated left ventricular end-diastolic pressure.  (study result " reviewed)    CAROTID ULTRASOUND (07/20/17)  Mild bilateral internal carotid artery stenosis (<50%).  (study result reviewed)    ECHOCARDIOGRAM CONCLUSIONS (07/20/17)  Normal left ventricular size and systolic function. Normal diastolic   function. Mild concentric left ventricular hypertrophy. Normal regional   wall motion. Left ventricular ejection fraction is visually estimated   to be 60%.  Compared to the report of the study done 6/2014- there has been no   significant change  (study result reviewed)    EKG performed on (06/05/19) was reviewed: EKG personally interpreted shows sinus bradycardia.    Laboratory results of (11/14/19) were reviewed. Cholesterol profile of 177/146/52/96 noted.    Assessment:     1. Coronary artery disease involving native coronary artery of native heart without angina pectoris  EC-ECHOCARDIOGRAM COMPLETE W/O CONT   2. S/P CABG x 3  EC-ECHOCARDIOGRAM COMPLETE W/O CONT   3. Essential hypertension, benign  EC-ECHOCARDIOGRAM COMPLETE W/O CONT   4. Hyperlipidemia, mixed  Lipid Profile       Medical Decision Making:  Today's Assessment / Status / Plan:     1. Coronary artery disease with prior coronary bypass graft (x 4 with left internal   mammary to left anterior descending, reverse saphenous vein graft separately   to the first obtuse marginal branch and the distal obtuse marginal branch and   the right coronary artery just proximal to the varun by Mando Elliott 10/30/14):   She is clinically doing well. I will continue with current medical care including coronary   artery disease with aspirin, metoprolol and atorvastatin. We will repeat an echocardiogram.  2. Hypertension: Blood pressure is well controlled. We will continue with beta blockade therapy.  3. Hyperlipidemia: She is doing well on statin therapy without myalgia symptoms. We will repeat labs including fasting lipid profile in 3 months.    We will follow up in three months with echocardiogram and labs.    CC Dr. Scott,  Judy

## 2020-01-15 ENCOUNTER — HOSPITAL ENCOUNTER (OUTPATIENT)
Dept: LAB | Facility: MEDICAL CENTER | Age: 65
End: 2020-01-15
Attending: PHYSICIAN ASSISTANT
Payer: COMMERCIAL

## 2020-01-24 ENCOUNTER — APPOINTMENT (OUTPATIENT)
Dept: CARDIOLOGY | Facility: MEDICAL CENTER | Age: 65
End: 2020-01-24
Attending: INTERNAL MEDICINE
Payer: COMMERCIAL

## 2020-03-24 ENCOUNTER — HOSPITAL ENCOUNTER (OUTPATIENT)
Dept: RADIOLOGY | Facility: MEDICAL CENTER | Age: 65
End: 2020-03-24
Payer: MEDICARE

## 2020-04-03 ENCOUNTER — APPOINTMENT (OUTPATIENT)
Dept: CARDIOLOGY | Facility: MEDICAL CENTER | Age: 65
End: 2020-04-03
Payer: MEDICARE

## 2020-06-03 ENCOUNTER — OFFICE VISIT (OUTPATIENT)
Dept: CARDIOLOGY | Facility: MEDICAL CENTER | Age: 65
End: 2020-06-03
Payer: MEDICARE

## 2020-06-03 VITALS
DIASTOLIC BLOOD PRESSURE: 82 MMHG | HEIGHT: 59 IN | BODY MASS INDEX: 28.62 KG/M2 | HEART RATE: 80 BPM | OXYGEN SATURATION: 94 % | RESPIRATION RATE: 16 BRPM | SYSTOLIC BLOOD PRESSURE: 126 MMHG | WEIGHT: 141.98 LBS

## 2020-06-03 DIAGNOSIS — I25.10 CORONARY ARTERY DISEASE INVOLVING NATIVE CORONARY ARTERY OF NATIVE HEART WITHOUT ANGINA PECTORIS: ICD-10-CM

## 2020-06-03 DIAGNOSIS — I10 ESSENTIAL HYPERTENSION, BENIGN: ICD-10-CM

## 2020-06-03 DIAGNOSIS — E78.2 HYPERLIPIDEMIA, MIXED: ICD-10-CM

## 2020-06-03 DIAGNOSIS — E78.2 MIXED HYPERLIPIDEMIA: ICD-10-CM

## 2020-06-03 DIAGNOSIS — Z95.1 S/P CABG X 3: ICD-10-CM

## 2020-06-03 PROCEDURE — 99213 OFFICE O/P EST LOW 20 MIN: CPT | Performed by: INTERNAL MEDICINE

## 2020-06-03 RX ORDER — ATORVASTATIN CALCIUM 40 MG/1
40 TABLET, FILM COATED ORAL DAILY
Qty: 90 TAB | Refills: 3 | Status: SHIPPED | OUTPATIENT
Start: 2020-06-03 | End: 2022-01-07 | Stop reason: SDUPTHER

## 2020-06-03 ASSESSMENT — ENCOUNTER SYMPTOMS
DIZZINESS: 0
NAUSEA: 0
VOMITING: 0
CHILLS: 0
PSYCHIATRIC NEGATIVE: 1
WEIGHT LOSS: 0
SHORTNESS OF BREATH: 0
CONSTITUTIONAL NEGATIVE: 1
DEPRESSION: 0
NEUROLOGICAL NEGATIVE: 1
RESPIRATORY NEGATIVE: 1
MYALGIAS: 0
PALPITATIONS: 0
COUGH: 0
NERVOUS/ANXIOUS: 0
CLAUDICATION: 0
FOCAL WEAKNESS: 0
BRUISES/BLEEDS EASILY: 0
HEADACHES: 0
WEAKNESS: 0
DOUBLE VISION: 0
EYES NEGATIVE: 1
ABDOMINAL PAIN: 0
GASTROINTESTINAL NEGATIVE: 1
BLURRED VISION: 0
FEVER: 0
CARDIOVASCULAR NEGATIVE: 1
MUSCULOSKELETAL NEGATIVE: 1

## 2020-06-03 ASSESSMENT — FIBROSIS 4 INDEX: FIB4 SCORE: 1.7

## 2020-06-03 NOTE — PROGRESS NOTES
Chief Complaint   Patient presents with   • Coronary Artery Disease       Subjective:   Malena Borden is a 64 y.o. female who presents today for follow up of coronary artery disease with prior coronary artery bypass graft surgery.    Since the patient's last visit on 01/10/20, she has been doing well clinically. She denies chest pain, shortness of breath, palpitations, nausea/vomiting or diaphoresis. She has been enjoying herself tomorrow. She did not undergo her echocardiogram or labs.    Past Medical History:   Diagnosis Date   • CAD (coronary artery disease) 2014    Multivessel CAD   • Heart attack (HCC) 2014    NSTEMI   • High cholesterol    • Hyperlipidemia    • Hypertension    • Indigestion    • Thyroid disease      Past Surgical History:   Procedure Laterality Date   • MULTIPLE CORONARY ARTERY BYPASS ENDO VEIN HARVEST  10/30/2014    Performed by Mando Vieira M.D. at SURGERY [facility] ORS   • RECOVERY  10/20/2014    Performed by Cath-Recovery Surgery at SURGERY SAME DAY [facility] ORS   • [facility] CARDIAC CATH  2014   • PB  DELIVERY ONLY     • TUBAL LIGATION       Family History   Problem Relation Age of Onset   • Lung Disease Mother    • Lung Disease Brother      Social History     Socioeconomic History   • Marital status: Single     Spouse name: Not on file   • Number of children: Not on file   • Years of education: Not on file   • Highest education level: Not on file   Occupational History   • Not on file   Social Needs   • Financial resource strain: Not on file   • Food insecurity     Worry: Not on file     Inability: Not on file   • Transportation needs     Medical: Not on file     Non-medical: Not on file   Tobacco Use   • Smoking status: Never Smoker   • Smokeless tobacco: Never Used   Substance and Sexual Activity   • Alcohol use: No   • Drug use: No   • Sexual activity: Not on file   Lifestyle   • Physical activity     Days per week: Not on file     Minutes per  session: Not on file   • Stress: Not on file   Relationships   • Social connections     Talks on phone: Not on file     Gets together: Not on file     Attends Caodaism service: Not on file     Active member of club or organization: Not on file     Attends meetings of clubs or organizations: Not on file     Relationship status: Not on file   • Intimate partner violence     Fear of current or ex partner: Not on file     Emotionally abused: Not on file     Physically abused: Not on file     Forced sexual activity: Not on file   Other Topics Concern   • Not on file   Social History Narrative    ** Merged History Encounter **          No Known Allergies     (Medications reviewed.)  Outpatient Encounter Medications as of 6/3/2020   Medication Sig Dispense Refill   • levothyroxine (SYNTHROID) 88 MCG Tab      • vitamin D (CHOLECALCIFEROL) 1000 UNIT Tab Take 1,000 Units by mouth every day.     • nitroglycerin (NITROSTAT) 0.4 MG SL Tab Place 1 Tab under tongue as needed for Chest Pain. 25 Tab 3   • metoprolol (LOPRESSOR) 25 MG Tab Take 0.5 Tabs by mouth 2 Times a Day. 90 Tab 4   • atorvastatin (LIPITOR) 40 MG Tab Take 1 Tab by mouth every day. 90 Tab 4   • aspirin 81 MG tablet Take 81 mg by mouth every day.     • [DISCONTINUED] atorvastatin (LIPITOR) 40 MG Tab      • [DISCONTINUED] methylPREDNISolone (MEDROL DOSEPAK) 4 MG Tablet Therapy Pack Take as directed (Patient not taking: Reported on 1/10/2020) 1 Kit 0   • [DISCONTINUED] levothyroxine (SYNTHROID) 88 MCG TABS Take 88 mcg by mouth every morning before breakfast.       No facility-administered encounter medications on file as of 6/3/2020.      Review of Systems   Constitutional: Negative.  Negative for chills, fever, malaise/fatigue and weight loss.   HENT: Negative.  Negative for hearing loss.    Eyes: Negative.  Negative for blurred vision and double vision.   Respiratory: Negative.  Negative for cough and shortness of breath.    Cardiovascular: Negative.  Negative for  "chest pain, palpitations, claudication and leg swelling.   Gastrointestinal: Negative.  Negative for abdominal pain, nausea and vomiting.   Genitourinary: Negative.  Negative for dysuria and urgency.   Musculoskeletal: Negative.  Negative for joint pain and myalgias.   Skin: Negative.  Negative for itching and rash.   Neurological: Negative.  Negative for dizziness, focal weakness, weakness and headaches.   Endo/Heme/Allergies: Negative.  Does not bruise/bleed easily.   Psychiatric/Behavioral: Negative.  Negative for depression. The patient is not nervous/anxious.         Objective:   /82 (BP Location: Right arm, Patient Position: Sitting, BP Cuff Size: Adult)   Pulse 80   Resp 16   Ht 1.499 m (4' 11\")   Wt 64.4 kg (141 lb 15.6 oz)   SpO2 94%   BMI 28.68 kg/m²     Physical Exam   Constitutional: She is oriented to person, place, and time. She appears well-developed and well-nourished.   HENT:   Head: Normocephalic and atraumatic.   Eyes: EOM are normal.   Neck: No JVD present.   Cardiovascular: Normal rate, regular rhythm and normal heart sounds.   Pulmonary/Chest: Effort normal and breath sounds normal.   Abdominal: Soft. Bowel sounds are normal.   No hepatosplenomegaly.   Musculoskeletal: Normal range of motion.   Lymphadenopathy:     She has no cervical adenopathy.   Neurological: She is alert and oriented to person, place, and time.   Skin: Skin is warm and dry.   Psychiatric: She has a normal mood and affect.     CARDIAC STUDIES/PROCEDURES:     ANKLE BRACHIAL INDEX (07/20/17)  No evidence of arterial insufficiency.      CARDIAC CATHETERIZATION CONCLUSIONS (06/06/19)  1.  Native 3-vessel coronary artery disease with patent left internal mammary   artery to the left anterior descending artery, patent saphenous vein graft to   an obtuse marginal branch, patent saphenous vein graft to the distal right   coronary artery.  2.  Mildly reduced left ventricular systolic function with ejection fraction of " 40%.  3.  Mildly elevated left ventricular end-diastolic pressure.     CAROTID ULTRASOUND (07/20/17)  Mild bilateral internal carotid artery stenosis (<50%).     ECHOCARDIOGRAM CONCLUSIONS (07/20/17)  Normal left ventricular size and systolic function.   Normal diastolic function.   Mild concentric left ventricular hypertrophy.   Normal regional wall motion.   Left ventricular ejection fraction is visually estimated to be 60%.  Compared to the report of the study done 6/2014- there has been no   significant change     EKG performed on (06/05/19) EKG shows sinus bradycardia.     Laboratory results of (11/14/19) Cholesterol profile of 177/146/52/96 noted.    Assessment:     1. Coronary artery disease involving native coronary artery of native heart without angina pectoris     2. S/P CABG x 3     3. Essential hypertension, benign     4. Hyperlipidemia, mixed         Medical Decision Making:  Today's Assessment / Status / Plan:     1. Coronary artery disease with prior coronary bypass graft (x 4 with left internal mammary to left anterior descending, reverse saphenous vein graft separately to the first obtuse marginal branch and the distal obtuse marginal branch and the right coronary artery just proximal to the varun by Mando Elliott 10/30/14):   She is clinically doing well. I will continue with current medical care including aspirin, metoprolol and atorvastatin. We will repeat an echocardiogram as her cardiac catheterization showed ejection fraction of 40%.  2. Hypertension: Blood pressure is well controlled. We will continue with metoprolol.  3. Hyperlipidemia: She is doing well on statin therapy without myalgia symptoms. She will bring her recent labs.  4. Medication intolerance: She is intolerant to lisinopril due to cough.     We will follow up in three months with echocardiogram.    CC Judy Sierra

## 2020-06-10 ENCOUNTER — HOSPITAL ENCOUNTER (OUTPATIENT)
Dept: CARDIOLOGY | Facility: MEDICAL CENTER | Age: 65
End: 2020-06-10
Attending: INTERNAL MEDICINE
Payer: MEDICARE

## 2020-06-10 DIAGNOSIS — I25.10 CORONARY ARTERY DISEASE INVOLVING NATIVE CORONARY ARTERY OF NATIVE HEART WITHOUT ANGINA PECTORIS: ICD-10-CM

## 2020-06-10 DIAGNOSIS — Z95.1 S/P CABG X 3: ICD-10-CM

## 2020-06-10 PROCEDURE — 93306 TTE W/DOPPLER COMPLETE: CPT

## 2020-06-11 ENCOUNTER — TELEPHONE (OUTPATIENT)
Dept: CARDIOLOGY | Facility: MEDICAL CENTER | Age: 65
End: 2020-06-11

## 2020-06-11 LAB
LV EJECT FRACT  99904: 65
LV EJECT FRACT MOD 2C 99903: 65.62
LV EJECT FRACT MOD 4C 99902: 71
LV EJECT FRACT MOD BP 99901: 69.02

## 2020-06-11 PROCEDURE — 93306 TTE W/DOPPLER COMPLETE: CPT | Mod: 26 | Performed by: INTERNAL MEDICINE

## 2020-06-11 NOTE — TELEPHONE ENCOUNTER
----- Message from Manuel Rene M.D. sent at 6/11/2020  8:18 AM PDT -----  Please call with unremarkable study, echocardiogram showing normal left ventricular systolic function.    Thanks.  JOVANNA

## 2020-06-11 NOTE — TELEPHONE ENCOUNTER
Called the patient and let her know the results of her echocardiogram are normal. She was thankful for the call, she will see us in Sept for her visit with Dr. Rene. SC

## 2020-06-27 ENCOUNTER — HOSPITAL ENCOUNTER (OUTPATIENT)
Dept: RADIOLOGY | Facility: MEDICAL CENTER | Age: 65
End: 2020-06-27
Attending: PHYSICIAN ASSISTANT
Payer: MEDICARE

## 2020-06-27 DIAGNOSIS — Z12.31 VISIT FOR SCREENING MAMMOGRAM: ICD-10-CM

## 2020-06-27 PROCEDURE — 77067 SCR MAMMO BI INCL CAD: CPT

## 2020-09-11 ENCOUNTER — OFFICE VISIT (OUTPATIENT)
Dept: CARDIOLOGY | Facility: MEDICAL CENTER | Age: 65
End: 2020-09-11
Payer: MEDICARE

## 2020-09-11 VITALS
OXYGEN SATURATION: 95 % | BODY MASS INDEX: 29.03 KG/M2 | HEART RATE: 62 BPM | RESPIRATION RATE: 12 BRPM | DIASTOLIC BLOOD PRESSURE: 88 MMHG | WEIGHT: 144 LBS | HEIGHT: 59 IN | SYSTOLIC BLOOD PRESSURE: 148 MMHG

## 2020-09-11 DIAGNOSIS — I25.10 CORONARY ARTERY DISEASE INVOLVING NATIVE CORONARY ARTERY OF NATIVE HEART WITHOUT ANGINA PECTORIS: ICD-10-CM

## 2020-09-11 DIAGNOSIS — Z95.1 S/P CABG X 3: ICD-10-CM

## 2020-09-11 DIAGNOSIS — I10 ESSENTIAL HYPERTENSION, BENIGN: ICD-10-CM

## 2020-09-11 DIAGNOSIS — E78.2 MIXED HYPERLIPIDEMIA: ICD-10-CM

## 2020-09-11 PROCEDURE — 99213 OFFICE O/P EST LOW 20 MIN: CPT | Performed by: INTERNAL MEDICINE

## 2020-09-11 ASSESSMENT — ENCOUNTER SYMPTOMS
FEVER: 0
DEPRESSION: 0
BRUISES/BLEEDS EASILY: 0
NEUROLOGICAL NEGATIVE: 1
MUSCULOSKELETAL NEGATIVE: 1
ABDOMINAL PAIN: 0
PALPITATIONS: 0
CONSTITUTIONAL NEGATIVE: 1
PSYCHIATRIC NEGATIVE: 1
NAUSEA: 0
RESPIRATORY NEGATIVE: 1
MYALGIAS: 0
DIZZINESS: 0
FOCAL WEAKNESS: 0
NERVOUS/ANXIOUS: 0
CHILLS: 0
COUGH: 0
DOUBLE VISION: 0
BLURRED VISION: 0
GASTROINTESTINAL NEGATIVE: 1
WEIGHT LOSS: 0
SHORTNESS OF BREATH: 0
HEADACHES: 0
EYES NEGATIVE: 1
CLAUDICATION: 0
WEAKNESS: 0
VOMITING: 0
CARDIOVASCULAR NEGATIVE: 1

## 2020-09-11 ASSESSMENT — FIBROSIS 4 INDEX: FIB4 SCORE: 1.72

## 2020-09-11 NOTE — PROGRESS NOTES
No chief complaint on file.      Subjective:   Malena Borden is a 65 y.o. female who presents today for follow up of coronary artery disease with prior coronary artery bypass graft surgery, study result review.    Since the patient's last visit on 20, she has been doing well clinically. She denies chest pain, shortness of breath, palpitations, nausea/vomiting or diaphoresis. She underwent unremarkable cardiac studies as described.    Past Medical History:   Diagnosis Date   • CAD (coronary artery disease) 2014    Multivessel CAD   • Heart attack (HCC) 2014    NSTEMI   • High cholesterol    • Hyperlipidemia    • Hypertension    • Indigestion    • Thyroid disease      Past Surgical History:   Procedure Laterality Date   • MULTIPLE CORONARY ARTERY BYPASS ENDO VEIN HARVEST  10/30/2014    Performed by Mando Vieira M.D. at SURGERY [facility] ORS   • RECOVERY  10/20/2014    Performed by Cath-Recovery Surgery at SURGERY SAME DAY [facility] ORS   • [facility] CARDIAC CATH  2014   • PB  DELIVERY ONLY     • TUBAL LIGATION       Family History   Problem Relation Age of Onset   • Lung Disease Mother    • Lung Disease Brother      Social History     Socioeconomic History   • Marital status: Single     Spouse name: Not on file   • Number of children: Not on file   • Years of education: Not on file   • Highest education level: Not on file   Occupational History   • Not on file   Social Needs   • Financial resource strain: Not on file   • Food insecurity     Worry: Not on file     Inability: Not on file   • Transportation needs     Medical: Not on file     Non-medical: Not on file   Tobacco Use   • Smoking status: Never Smoker   • Smokeless tobacco: Never Used   Substance and Sexual Activity   • Alcohol use: No   • Drug use: No   • Sexual activity: Not on file   Lifestyle   • Physical activity     Days per week: Not on file     Minutes per session: Not on file   • Stress: Not on file    Relationships   • Social connections     Talks on phone: Not on file     Gets together: Not on file     Attends Judaism service: Not on file     Active member of club or organization: Not on file     Attends meetings of clubs or organizations: Not on file     Relationship status: Not on file   • Intimate partner violence     Fear of current or ex partner: Not on file     Emotionally abused: Not on file     Physically abused: Not on file     Forced sexual activity: Not on file   Other Topics Concern   • Not on file   Social History Narrative    ** Merged History Encounter **          No Known Allergies     (Medications reviewed.)  Outpatient Encounter Medications as of 9/11/2020   Medication Sig Dispense Refill   • atorvastatin (LIPITOR) 40 MG Tab Take 1 Tab by mouth every day. 90 Tab 3   • levothyroxine (SYNTHROID) 88 MCG Tab      • vitamin D (CHOLECALCIFEROL) 1000 UNIT Tab Take 1,000 Units by mouth every day.     • nitroglycerin (NITROSTAT) 0.4 MG SL Tab Place 1 Tab under tongue as needed for Chest Pain. 25 Tab 3   • metoprolol (LOPRESSOR) 25 MG Tab Take 0.5 Tabs by mouth 2 Times a Day. 90 Tab 4   • aspirin 81 MG tablet Take 81 mg by mouth every day.       No facility-administered encounter medications on file as of 9/11/2020.      Review of Systems   Constitutional: Negative.  Negative for chills, fever, malaise/fatigue and weight loss.   HENT: Negative.  Negative for hearing loss.    Eyes: Negative.  Negative for blurred vision and double vision.   Respiratory: Negative.  Negative for cough and shortness of breath.    Cardiovascular: Negative.  Negative for chest pain, palpitations, claudication and leg swelling.   Gastrointestinal: Negative.  Negative for abdominal pain, nausea and vomiting.   Genitourinary: Negative.  Negative for dysuria and urgency.   Musculoskeletal: Negative.  Negative for joint pain and myalgias.   Skin: Negative.  Negative for itching and rash.   Neurological: Negative.  Negative for  "dizziness, focal weakness, weakness and headaches.   Endo/Heme/Allergies: Negative.  Does not bruise/bleed easily.   Psychiatric/Behavioral: Negative.  Negative for depression. The patient is not nervous/anxious.         Objective:   /88 (BP Location: Left arm, Patient Position: Sitting, BP Cuff Size: Adult)   Pulse 62   Resp 12   Ht 1.499 m (4' 11\")   Wt 65.3 kg (144 lb)   SpO2 95%   BMI 29.08 kg/m²     Physical Exam   Constitutional: She is oriented to person, place, and time. She appears well-developed and well-nourished.   HENT:   Head: Normocephalic and atraumatic.   Eyes: EOM are normal.   Neck: No JVD present.   Cardiovascular: Normal rate, regular rhythm and normal heart sounds.   Pulmonary/Chest: Effort normal and breath sounds normal.   Abdominal: Soft. Bowel sounds are normal.   No hepatosplenomegaly.   Musculoskeletal: Normal range of motion.   Lymphadenopathy:     She has no cervical adenopathy.   Neurological: She is alert and oriented to person, place, and time.   Skin: Skin is warm and dry.   Psychiatric: She has a normal mood and affect.     CARDIAC STUDIES/PROCEDURES:     ANKLE BRACHIAL INDEX (07/20/17)  No evidence of arterial insufficiency.      CARDIAC CATHETERIZATION CONCLUSIONS (06/06/19)  1.  Native 3-vessel coronary artery disease with patent left internal mammary   artery to the left anterior descending artery, patent saphenous vein graft to   an obtuse marginal branch, patent saphenous vein graft to the distal right   coronary artery.  2.  Mildly reduced left ventricular systolic function with ejection fraction of 40%.  3.  Mildly elevated left ventricular end-diastolic pressure.     CAROTID ULTRASOUND (07/20/17)  Mild bilateral internal carotid artery stenosis (<50%).    ECHOCARDIOGRAM CONCLUSIONS (06/10/20)  Prior study done 07/20/2017, compared to the report of the study done -   there has been no significant change.   Normal left ventricular systolic function.  Left " ventricular ejection fraction is visually estimated to be 65%.  No significant valve abnormalities.   (study result reviewed)     ECHOCARDIOGRAM CONCLUSIONS (07/20/17)  Normal left ventricular size and systolic function.   Normal diastolic function.   Mild concentric left ventricular hypertrophy.   Normal regional wall motion.   Left ventricular ejection fraction is visually estimated to be 60%.  Compared to the report of the study done 6/2014- there has been no   significant change     EKG performed on (06/05/19) EKG shows sinus bradycardia.    Laboratory results of (01/15/20) were reviewed. Cholesterol profile of 152/107/54/77 noted.   Laboratory results of (11/14/19) Cholesterol profile of 177/146/52/96 noted.    PET SCAN (05/22/19)  1.  No ischemic ST-T wave changes are noted.  2.  Large area of complete reversibility in the entire anterolateral wall,   concerning for ischemia.  3.  Normal left ventricular systolic function as discussed above.  4.  Transient ischemic dilatation is noted, concerning for 3-vessel disease.    Dr. Ana Muller was updated about the above findings at the time of the read.  (study result reviewed)    Assessment:     1. Coronary artery disease involving native coronary artery of native heart without angina pectoris     2. S/P CABG x 3     3. Essential hypertension, benign     4. Hyperlipidemia, mixed         Medical Decision Making:  Today's Assessment / Status / Plan:     1. Coronary artery disease with prior coronary bypass graft (x 4 with left internal mammary to left anterior descending, reverse saphenous vein graft separately to the first obtuse marginal branch and the distal obtuse marginal branch and the right coronary artery just proximal to the varun by Mando Elliott 10/30/14): She is clinically doing well. I will continue with current medical care including aspirin, metoprolol and atorvastatin.  2. Hypertension: Blood pressure is high today, however, usually well  controlled. We will continue with medical therapy including beta blockade therapy. She will continue to monitor her blood pressure and contact us if her blood pressure remains elevated.  3. Hyperlipidemia: She is doing well on statin therapy without myalgia symptoms. (Managed by primary care physician)  4. Medication intolerance: She is intolerant to lisinopril due to cough.      We will follow up the patient in one year.     CC Judy Sierra

## 2021-01-06 ENCOUNTER — HOSPITAL ENCOUNTER (OUTPATIENT)
Dept: LAB | Facility: MEDICAL CENTER | Age: 66
End: 2021-01-06
Attending: FAMILY MEDICINE
Payer: MEDICARE

## 2021-01-06 LAB
ALBUMIN SERPL BCP-MCNC: 4.6 G/DL (ref 3.2–4.9)
ALBUMIN/GLOB SERPL: 1.9 G/DL
ALP SERPL-CCNC: 112 U/L (ref 30–99)
ALT SERPL-CCNC: 31 U/L (ref 2–50)
ANION GAP SERPL CALC-SCNC: 13 MMOL/L (ref 7–16)
AST SERPL-CCNC: 25 U/L (ref 12–45)
BASOPHILS # BLD AUTO: 0.6 % (ref 0–1.8)
BASOPHILS # BLD: 0.04 K/UL (ref 0–0.12)
BILIRUB SERPL-MCNC: 0.9 MG/DL (ref 0.1–1.5)
BUN SERPL-MCNC: 22 MG/DL (ref 8–22)
CALCIUM SERPL-MCNC: 10 MG/DL (ref 8.5–10.5)
CHLORIDE SERPL-SCNC: 104 MMOL/L (ref 96–112)
CHOLEST SERPL-MCNC: 151 MG/DL (ref 100–199)
CO2 SERPL-SCNC: 23 MMOL/L (ref 20–33)
CREAT SERPL-MCNC: 1.06 MG/DL (ref 0.5–1.4)
EOSINOPHIL # BLD AUTO: 0.21 K/UL (ref 0–0.51)
EOSINOPHIL NFR BLD: 3.2 % (ref 0–6.9)
ERYTHROCYTE [DISTWIDTH] IN BLOOD BY AUTOMATED COUNT: 40.3 FL (ref 35.9–50)
EST. AVERAGE GLUCOSE BLD GHB EST-MCNC: 128 MG/DL
GLOBULIN SER CALC-MCNC: 2.4 G/DL (ref 1.9–3.5)
GLUCOSE SERPL-MCNC: 77 MG/DL (ref 65–99)
HBA1C MFR BLD: 6.1 % (ref 0–5.6)
HCT VFR BLD AUTO: 48.3 % (ref 37–47)
HCV AB SER QL: NORMAL
HDLC SERPL-MCNC: 49 MG/DL
HGB BLD-MCNC: 15.8 G/DL (ref 12–16)
IMM GRANULOCYTES # BLD AUTO: 0.02 K/UL (ref 0–0.11)
IMM GRANULOCYTES NFR BLD AUTO: 0.3 % (ref 0–0.9)
LDLC SERPL CALC-MCNC: 80 MG/DL
LYMPHOCYTES # BLD AUTO: 1.91 K/UL (ref 1–4.8)
LYMPHOCYTES NFR BLD: 28.7 % (ref 22–41)
MCH RBC QN AUTO: 29.4 PG (ref 27–33)
MCHC RBC AUTO-ENTMCNC: 32.7 G/DL (ref 33.6–35)
MCV RBC AUTO: 89.8 FL (ref 81.4–97.8)
MONOCYTES # BLD AUTO: 0.34 K/UL (ref 0–0.85)
MONOCYTES NFR BLD AUTO: 5.1 % (ref 0–13.4)
NEUTROPHILS # BLD AUTO: 4.13 K/UL (ref 2–7.15)
NEUTROPHILS NFR BLD: 62.1 % (ref 44–72)
NRBC # BLD AUTO: 0 K/UL
NRBC BLD-RTO: 0 /100 WBC
PLATELET # BLD AUTO: 178 K/UL (ref 164–446)
PMV BLD AUTO: 14.4 FL (ref 9–12.9)
POTASSIUM SERPL-SCNC: 3.9 MMOL/L (ref 3.6–5.5)
PROT SERPL-MCNC: 7 G/DL (ref 6–8.2)
RBC # BLD AUTO: 5.38 M/UL (ref 4.2–5.4)
SODIUM SERPL-SCNC: 140 MMOL/L (ref 135–145)
T3FREE SERPL-MCNC: 3.41 PG/ML (ref 2–4.4)
T4 FREE SERPL-MCNC: 1.66 NG/DL (ref 0.93–1.7)
TRIGL SERPL-MCNC: 109 MG/DL (ref 0–149)
TSH SERPL DL<=0.005 MIU/L-ACNC: 0.22 UIU/ML (ref 0.38–5.33)
WBC # BLD AUTO: 6.7 K/UL (ref 4.8–10.8)

## 2021-01-06 PROCEDURE — 80053 COMPREHEN METABOLIC PANEL: CPT

## 2021-01-06 PROCEDURE — 85025 COMPLETE CBC W/AUTO DIFF WBC: CPT

## 2021-01-06 PROCEDURE — 80061 LIPID PANEL: CPT

## 2021-01-06 PROCEDURE — 83036 HEMOGLOBIN GLYCOSYLATED A1C: CPT

## 2021-01-06 PROCEDURE — 36415 COLL VENOUS BLD VENIPUNCTURE: CPT

## 2021-01-06 PROCEDURE — 81003 URINALYSIS AUTO W/O SCOPE: CPT

## 2021-01-06 PROCEDURE — 84481 FREE ASSAY (FT-3): CPT

## 2021-01-06 PROCEDURE — 84443 ASSAY THYROID STIM HORMONE: CPT

## 2021-01-06 PROCEDURE — 84439 ASSAY OF FREE THYROXINE: CPT

## 2021-01-06 PROCEDURE — 86803 HEPATITIS C AB TEST: CPT

## 2021-01-07 LAB
APPEARANCE UR: CLEAR
BILIRUB UR QL STRIP.AUTO: NEGATIVE
COLOR UR: YELLOW
GLUCOSE UR STRIP.AUTO-MCNC: NEGATIVE MG/DL
KETONES UR STRIP.AUTO-MCNC: NEGATIVE MG/DL
LEUKOCYTE ESTERASE UR QL STRIP.AUTO: NEGATIVE
MICRO URNS: NORMAL
NITRITE UR QL STRIP.AUTO: NEGATIVE
PH UR STRIP.AUTO: 5.5 [PH] (ref 5–8)
PROT UR QL STRIP: NEGATIVE MG/DL
RBC UR QL AUTO: NEGATIVE
SP GR UR STRIP.AUTO: 1.01
UROBILINOGEN UR STRIP.AUTO-MCNC: 0.2 MG/DL

## 2021-03-03 DIAGNOSIS — Z23 NEED FOR VACCINATION: ICD-10-CM

## 2021-07-13 ENCOUNTER — HOSPITAL ENCOUNTER (OUTPATIENT)
Dept: RADIOLOGY | Facility: MEDICAL CENTER | Age: 66
End: 2021-07-13
Attending: FAMILY MEDICINE
Payer: MEDICARE

## 2021-07-13 DIAGNOSIS — Z12.31 ENCOUNTER FOR SCREENING MAMMOGRAM FOR BREAST CANCER: ICD-10-CM

## 2021-07-13 PROCEDURE — 77063 BREAST TOMOSYNTHESIS BI: CPT

## 2021-08-24 ENCOUNTER — PATIENT MESSAGE (OUTPATIENT)
Dept: HEALTH INFORMATION MANAGEMENT | Facility: OTHER | Age: 66
End: 2021-08-24

## 2021-09-14 ENCOUNTER — OFFICE VISIT (OUTPATIENT)
Dept: CARDIOLOGY | Facility: MEDICAL CENTER | Age: 66
End: 2021-09-14
Payer: MEDICARE

## 2021-09-14 VITALS
RESPIRATION RATE: 12 BRPM | WEIGHT: 143 LBS | HEIGHT: 59 IN | SYSTOLIC BLOOD PRESSURE: 140 MMHG | HEART RATE: 55 BPM | DIASTOLIC BLOOD PRESSURE: 82 MMHG | OXYGEN SATURATION: 97 % | BODY MASS INDEX: 28.83 KG/M2

## 2021-09-14 DIAGNOSIS — Z95.1 S/P CABG X 3: ICD-10-CM

## 2021-09-14 DIAGNOSIS — I25.10 CORONARY ARTERY DISEASE INVOLVING NATIVE CORONARY ARTERY OF NATIVE HEART WITHOUT ANGINA PECTORIS: ICD-10-CM

## 2021-09-14 DIAGNOSIS — I10 ESSENTIAL HYPERTENSION, BENIGN: ICD-10-CM

## 2021-09-14 DIAGNOSIS — E78.2 MIXED HYPERLIPIDEMIA: ICD-10-CM

## 2021-09-14 PROCEDURE — 99215 OFFICE O/P EST HI 40 MIN: CPT | Performed by: INTERNAL MEDICINE

## 2021-09-14 ASSESSMENT — ENCOUNTER SYMPTOMS
HEADACHES: 0
WEAKNESS: 0
CARDIOVASCULAR NEGATIVE: 1
GASTROINTESTINAL NEGATIVE: 1
FOCAL WEAKNESS: 0
NERVOUS/ANXIOUS: 0
CLAUDICATION: 0
NAUSEA: 0
BRUISES/BLEEDS EASILY: 0
DOUBLE VISION: 0
WEIGHT LOSS: 0
PALPITATIONS: 0
COUGH: 0
MUSCULOSKELETAL NEGATIVE: 1
PSYCHIATRIC NEGATIVE: 1
CHILLS: 0
MYALGIAS: 0
BLURRED VISION: 0
RESPIRATORY NEGATIVE: 1
NEUROLOGICAL NEGATIVE: 1
DIZZINESS: 0
ABDOMINAL PAIN: 0
EYES NEGATIVE: 1
FEVER: 0
VOMITING: 0
CONSTITUTIONAL NEGATIVE: 1
DEPRESSION: 0
SHORTNESS OF BREATH: 0

## 2021-09-14 ASSESSMENT — FIBROSIS 4 INDEX: FIB4 SCORE: 1.69

## 2021-09-14 NOTE — PROGRESS NOTES
Chief Complaint   Patient presents with   • MI (Non ST Segment Elevation MI)   • Hypertension     F/V Dx; Essential hypertension, benign   • Coronary Artery Disease     F/V Dx; Coronary artery disease involving native coronary artery of native heart without angina pectoris       Subjective     Malena Borden is a 66 y.o. female who presents today for annual follow up of coronary artery disease with prior coronary artery bypass graft surgery.    Since the patient's last visit on 20, she has been doing well clinically. She denies chest pain, shortness of breath, palpitations, nausea/vomiting or diaphoresis. She is inactive.     Past Medical History:   Diagnosis Date   • CAD (coronary artery disease) 2014    Multivessel CAD   • Heart attack (HCC) 2014    NSTEMI   • High cholesterol    • Hyperlipidemia    • Hypertension    • Indigestion    • Thyroid disease      Past Surgical History:   Procedure Laterality Date   • MULTIPLE CORONARY ARTERY BYPASS ENDO VEIN HARVEST  10/30/2014    Performed by Mando Vieira M.D. at SURGERY [facility] ORS   • RECOVERY  10/20/2014    Performed by Cath-Recovery Surgery at SURGERY SAME DAY [facility] ORS   • [facility] CARDIAC CATH  2014   • PB  DELIVERY ONLY     • TUBAL LIGATION       Family History   Problem Relation Age of Onset   • Lung Disease Mother    • Lung Disease Brother      Social History     Socioeconomic History   • Marital status: Single     Spouse name: Not on file   • Number of children: Not on file   • Years of education: Not on file   • Highest education level: Not on file   Occupational History   • Not on file   Tobacco Use   • Smoking status: Never Smoker   • Smokeless tobacco: Never Used   Substance and Sexual Activity   • Alcohol use: No   • Drug use: No   • Sexual activity: Not on file   Other Topics Concern   • Not on file   Social History Narrative    ** Merged History Encounter **          Social Determinants of Health      Financial Resource Strain:    • Difficulty of Paying Living Expenses:    Food Insecurity:    • Worried About Running Out of Food in the Last Year:    • Ran Out of Food in the Last Year:    Transportation Needs:    • Lack of Transportation (Medical):    • Lack of Transportation (Non-Medical):    Physical Activity:    • Days of Exercise per Week:    • Minutes of Exercise per Session:    Stress:    • Feeling of Stress :    Social Connections:    • Frequency of Communication with Friends and Family:    • Frequency of Social Gatherings with Friends and Family:    • Attends Congregational Services:    • Active Member of Clubs or Organizations:    • Attends Club or Organization Meetings:    • Marital Status:    Intimate Partner Violence:    • Fear of Current or Ex-Partner:    • Emotionally Abused:    • Physically Abused:    • Sexually Abused:      No Known Allergies     (Medications reviewed.)  Outpatient Encounter Medications as of 9/14/2021   Medication Sig Dispense Refill   • Chlorpheniramine Maleate (ALLERGY 4 HOUR PO) Take  by mouth.     • atorvastatin (LIPITOR) 40 MG Tab Take 1 Tab by mouth every day. 90 Tab 3   • levothyroxine (SYNTHROID) 88 MCG Tab      • vitamin D (CHOLECALCIFEROL) 1000 UNIT Tab Take 1,000 Units by mouth every day.     • nitroglycerin (NITROSTAT) 0.4 MG SL Tab Place 1 Tab under tongue as needed for Chest Pain. 25 Tab 3   • metoprolol (LOPRESSOR) 25 MG Tab Take 0.5 Tabs by mouth 2 Times a Day. 90 Tab 4   • aspirin 81 MG tablet Take 81 mg by mouth every day.       No facility-administered encounter medications on file as of 9/14/2021.     Review of Systems   Constitutional: Negative.  Negative for chills, fever, malaise/fatigue and weight loss.   HENT: Negative.  Negative for hearing loss.    Eyes: Negative.  Negative for blurred vision and double vision.   Respiratory: Negative.  Negative for cough and shortness of breath.    Cardiovascular: Negative.  Negative for chest pain, palpitations,  "claudication and leg swelling.   Gastrointestinal: Negative.  Negative for abdominal pain, nausea and vomiting.   Genitourinary: Negative.  Negative for dysuria and urgency.   Musculoskeletal: Negative.  Negative for joint pain and myalgias.   Skin: Negative.  Negative for itching and rash.   Neurological: Negative.  Negative for dizziness, focal weakness, weakness and headaches.   Endo/Heme/Allergies: Negative.  Does not bruise/bleed easily.   Psychiatric/Behavioral: Negative.  Negative for depression. The patient is not nervous/anxious.               Objective     /82 (BP Location: Left arm, Patient Position: Sitting, BP Cuff Size: Adult)   Pulse (!) 55   Resp 12   Ht 1.499 m (4' 11\")   Wt 64.9 kg (143 lb)   SpO2 97%   BMI 28.88 kg/m²     Physical Exam  Constitutional:       Appearance: She is well-developed.   HENT:      Head: Normocephalic and atraumatic.   Neck:      Vascular: No JVD.   Cardiovascular:      Rate and Rhythm: Normal rate and regular rhythm.      Heart sounds: Normal heart sounds.   Pulmonary:      Effort: Pulmonary effort is normal.      Breath sounds: Normal breath sounds.   Abdominal:      General: Bowel sounds are normal.      Palpations: Abdomen is soft.      Comments: No hepatosplenomegaly.   Musculoskeletal:         General: Normal range of motion.   Lymphadenopathy:      Cervical: No cervical adenopathy.   Skin:     General: Skin is warm and dry.   Neurological:      Mental Status: She is alert and oriented to person, place, and time.            CARDIAC STUDIES/PROCEDURES:     ANKLE BRACHIAL INDEX (07/20/17)  No evidence of arterial insufficiency.      CARDIAC CATHETERIZATION CONCLUSIONS (06/06/19)  1.  Native 3-vessel coronary artery disease with patent left internal mammary   artery to the left anterior descending artery, patent saphenous vein graft to   an obtuse marginal branch, patent saphenous vein graft to the distal right   coronary artery.  2.  Mildly reduced left " ventricular systolic function with ejection fraction of 40%.  3.  Mildly elevated left ventricular end-diastolic pressure.     CAROTID ULTRASOUND (07/20/17)  Mild bilateral internal carotid artery stenosis (<50%).     ECHOCARDIOGRAM CONCLUSIONS (06/10/20)  Prior study done 07/20/2017, compared to the report of the study done -   there has been no significant change.   Normal left ventricular systolic function.  Left ventricular ejection fraction is visually estimated to be 65%.  No significant valve abnormalities.     ECHOCARDIOGRAM CONCLUSIONS (07/20/17)  Normal left ventricular size and systolic function.   Normal diastolic function.   Mild concentric left ventricular hypertrophy.   Normal regional wall motion.   Left ventricular ejection fraction is visually estimated to be 60%.  Compared to the report of the study done 6/2014- there has been no   significant change     EKG performed on (06/05/19) EKG shows sinus bradycardia.     Laboratory results of (01/06/21) were reviewed. Cholesterol profile of 151/109/49/80 mg/dL noted.  Laboratory results of (01/15/20) Cholesterol profile of 152/107/54/77 noted.   Laboratory results of (11/14/19) Cholesterol profile of 177/146/52/96 noted.     PET SCAN (05/22/19)  1.  No ischemic ST-T wave changes are noted.  2.  Large area of complete reversibility in the entire anterolateral wall,   concerning for ischemia.  3.  Normal left ventricular systolic function as discussed above.  4.  Transient ischemic dilatation is noted, concerning for 3-vessel disease.    Dr. Ana Muller was updated about the above findings at the time of the read.    Assessment & Plan     1. Coronary artery disease involving native coronary artery of native heart without angina pectoris     2. S/P CABG x 3     3. Essential hypertension, benign     4. Hyperlipidemia, mixed         Medical Decision Making: Today's Assessment/Status/Plan:     1. Coronary artery disease with prior coronary bypass graft (x  4 with left internal mammary to left anterior descending, reverse saphenous vein graft separately to the first obtuse marginal branch and the distal obtuse marginal branch and the right coronary artery just proximal to the varun by Mando Elliott 10/30/14): She is clinically doing well. I will continue with current medical care including aspirin, metoprolol and atorvastatin. She will start increasing her activity.   2. Hypertension: Blood pressure is high today, however, usually well controlled. We will continue with medical therapy including beta blockade therapy. She will continue to monitor her blood pressure and contact us if her blood pressure remains elevated.  3. Hyperlipidemia: She is doing well on statin therapy without myalgia symptoms. (Managed by primary care physician)  4. Medication intolerance: She is intolerant to lisinopril due to cough.      Greater than 45 minutes of time was spent to review all above information; of which more than 50% of the time was face to face discussing the risk of COVID 19 and benefits of vaccine and risks of remaining unvaccinated.     We will follow up the patient in one year.     CC Judy Sierra

## 2021-09-22 ENCOUNTER — TELEPHONE (OUTPATIENT)
Dept: HEALTH INFORMATION MANAGEMENT | Facility: OTHER | Age: 66
End: 2021-09-22

## 2021-09-22 NOTE — TELEPHONE ENCOUNTER
Outcome: declnied jass     Please transfer to Patient Outreach Team at [phone] when patient returns call.        Attempt # 1

## 2021-11-16 ENCOUNTER — TELEPHONE (OUTPATIENT)
Dept: SCHEDULING | Facility: IMAGING CENTER | Age: 66
End: 2021-11-16

## 2021-12-27 ENCOUNTER — TELEPHONE (OUTPATIENT)
Dept: MEDICAL GROUP | Facility: PHYSICIAN GROUP | Age: 66
End: 2021-12-27

## 2021-12-27 NOTE — LETTER
[facility]  No primary care provider on file.  No primary provider on file.  Fax: [phone]   Authorization for Release/Disclosure of   Protected Health Information   Name: MALENA JUNE : 1955 SSN: [id number]   Address: [address] [place] [address]  [place] [address] Phone:    [phone] (home)    I authorize the entity listed below to release/disclose the PHI below to:   [facility]/No primary care provider on file. and Fatou Sherman D.O.   Provider or Entity Name:Judy Scott/ [facility]      Address   City, State, Zip   Phone:      Fax:[phone]     Reason for request: continuity of care   Information to be released:    [  ] LAST COLONOSCOPY,  including any PATH REPORT and follow-up  [  ] LAST FIT/COLOGUARD RESULT [  ] LAST DEXA  [  ] LAST MAMMOGRAM  [  ] LAST PAP  [  ] LAST LABS [  ] RETINA EXAM REPORT  [  ] IMMUNIZATION RECORDS  [ xx ] Release all info      [ xx ] Check here and initial the line next to each item to release ALL health information INCLUDING  _____ Care and treatment for drug and / or alcohol abuse  _____ HIV testing, infection status, or AIDS  _____ Genetic Testing    DATES OF SERVICE OR TIME PERIOD TO BE DISCLOSED: _____________  I understand and acknowledge that:  * This Authorization may be revoked at any time by you in writing, except if your health information has already been used or disclosed.  * Your health information that will be used or disclosed as a result of you signing this authorization could be re-disclosed by the recipient. If this occurs, your re-disclosed health information may no longer be protected by State or Federal laws.  * You may refuse to sign this Authorization. Your refusal will not affect your ability to obtain treatment.  * This Authorization becomes effective upon signing and will  on (date) __________.      If no date is indicated, this Authorization will  one (1) year from the signature date.    Name: Malena Dillon  Michi    Signature:FOR CONTINUITY OF CARE.    Date:     12/27/2021       PLEASE FAX REQUESTED RECORDS BACK TO: [phone]

## 2021-12-27 NOTE — TELEPHONE ENCOUNTER
NEW PATIENT VISIT PRE-VISIT PLANNING    1.  [employer] Patient is checked in Patient Demographics?Yes    2.  Immunizations were updated in Epic using Reconcile Outside Information activity? Yes         3.  Is this appointment scheduled as a Hospital Follow-Up? No    4.  Patient is due for the following Health Maintenance Topics:   Health Maintenance Due   Topic Date Due   • Annual Wellness Visit  Never done   • COLORECTAL CANCER SCREENING  Never done   • COVID-19 Vaccine (1) Never done   • PAP SMEAR  Never done   • IMM ZOSTER VACCINES (2 of 3) 02/24/2015   • BONE DENSITY  07/29/2019   • IMM INFLUENZA (1) Never done       5.  Reviewed/Updated the following with patient:       •   Preferred Pharmacy? Yes       •   Preferred Lab? Yes       •   Preferred Communication? Yes       •   Allergies? Yes       •   Medications? YES. Was Abstract Encounter opened and chart updated? YES       •   Social History? Yes       •   Family History (document living status of immediate family members and if + hx of  cancer, diabetes, hypertension, hyperlipidemia, heart attack, stroke) Yes    6.  Updated Care Team?       •   DME Company (gait device, O2, CPAP, etc.) N\A       •   Other Specialists (eye doctor, derm, GYN, cardiology, endo, etc): YES    7.  AHA (Puls[employer]) form printed for Provider? Yes

## 2022-01-06 ENCOUNTER — OFFICE VISIT (OUTPATIENT)
Dept: MEDICAL GROUP | Facility: PHYSICIAN GROUP | Age: 67
End: 2022-01-06
Payer: MEDICARE

## 2022-01-06 ENCOUNTER — HOSPITAL ENCOUNTER (OUTPATIENT)
Dept: LAB | Facility: MEDICAL CENTER | Age: 67
End: 2022-01-06
Attending: STUDENT IN AN ORGANIZED HEALTH CARE EDUCATION/TRAINING PROGRAM
Payer: MEDICARE

## 2022-01-06 VITALS
OXYGEN SATURATION: 96 % | BODY MASS INDEX: 28.67 KG/M2 | SYSTOLIC BLOOD PRESSURE: 120 MMHG | TEMPERATURE: 97.8 F | HEIGHT: 59 IN | WEIGHT: 142.2 LBS | HEART RATE: 71 BPM | RESPIRATION RATE: 16 BRPM | DIASTOLIC BLOOD PRESSURE: 82 MMHG

## 2022-01-06 DIAGNOSIS — M81.0 AGE-RELATED OSTEOPOROSIS WITHOUT CURRENT PATHOLOGICAL FRACTURE: ICD-10-CM

## 2022-01-06 DIAGNOSIS — E78.2 HYPERLIPIDEMIA, MIXED: ICD-10-CM

## 2022-01-06 DIAGNOSIS — Z95.1 S/P CABG X 3: ICD-10-CM

## 2022-01-06 DIAGNOSIS — E78.2 MIXED HYPERLIPIDEMIA: ICD-10-CM

## 2022-01-06 DIAGNOSIS — I10 ESSENTIAL HYPERTENSION, BENIGN: ICD-10-CM

## 2022-01-06 DIAGNOSIS — I10 ESSENTIAL HYPERTENSION: ICD-10-CM

## 2022-01-06 DIAGNOSIS — Z76.89 ENCOUNTER TO ESTABLISH CARE: ICD-10-CM

## 2022-01-06 DIAGNOSIS — E03.9 HYPOTHYROIDISM, UNSPECIFIED TYPE: ICD-10-CM

## 2022-01-06 DIAGNOSIS — Z86.19 HISTORY OF HERPES GENITALIS: ICD-10-CM

## 2022-01-06 DIAGNOSIS — N18.31 STAGE 3A CHRONIC KIDNEY DISEASE: ICD-10-CM

## 2022-01-06 DIAGNOSIS — I25.10 CORONARY ARTERY DISEASE INVOLVING NATIVE CORONARY ARTERY OF NATIVE HEART WITHOUT ANGINA PECTORIS: ICD-10-CM

## 2022-01-06 LAB
25(OH)D3 SERPL-MCNC: 86 NG/ML (ref 30–100)
ALBUMIN SERPL BCP-MCNC: 4.5 G/DL (ref 3.2–4.9)
ALP SERPL-CCNC: 119 U/L (ref 30–99)
ALT SERPL-CCNC: 25 U/L (ref 2–50)
AST SERPL-CCNC: 19 U/L (ref 12–45)
BASOPHILS # BLD AUTO: 1 % (ref 0–1.8)
BASOPHILS # BLD: 0.07 K/UL (ref 0–0.12)
BILIRUB CONJ SERPL-MCNC: <0.2 MG/DL (ref 0.1–0.5)
BILIRUB INDIRECT SERPL-MCNC: ABNORMAL MG/DL (ref 0–1)
BILIRUB SERPL-MCNC: 1.1 MG/DL (ref 0.1–1.5)
BUN SERPL-MCNC: 18 MG/DL (ref 8–22)
CALCIUM SERPL-MCNC: 10 MG/DL (ref 8.5–10.5)
CHLORIDE SERPL-SCNC: 103 MMOL/L (ref 96–112)
CHOLEST SERPL-MCNC: 156 MG/DL (ref 100–199)
CO2 SERPL-SCNC: 22 MMOL/L (ref 20–33)
CREAT SERPL-MCNC: 1.17 MG/DL (ref 0.5–1.4)
CREAT UR-MCNC: 19.52 MG/DL
EOSINOPHIL # BLD AUTO: 0.27 K/UL (ref 0–0.51)
EOSINOPHIL NFR BLD: 3.8 % (ref 0–6.9)
ERYTHROCYTE [DISTWIDTH] IN BLOOD BY AUTOMATED COUNT: 41.1 FL (ref 35.9–50)
FASTING STATUS PATIENT QL REPORTED: NORMAL
GLUCOSE SERPL-MCNC: 83 MG/DL (ref 65–99)
HCT VFR BLD AUTO: 47.6 % (ref 37–47)
HDLC SERPL-MCNC: 51 MG/DL
HGB BLD-MCNC: 15.5 G/DL (ref 12–16)
IMM GRANULOCYTES # BLD AUTO: 0.02 K/UL (ref 0–0.11)
IMM GRANULOCYTES NFR BLD AUTO: 0.3 % (ref 0–0.9)
LDLC SERPL CALC-MCNC: 80 MG/DL
LYMPHOCYTES # BLD AUTO: 2.27 K/UL (ref 1–4.8)
LYMPHOCYTES NFR BLD: 32.1 % (ref 22–41)
MCH RBC QN AUTO: 29.5 PG (ref 27–33)
MCHC RBC AUTO-ENTMCNC: 32.6 G/DL (ref 33.6–35)
MCV RBC AUTO: 90.7 FL (ref 81.4–97.8)
MICROALBUMIN UR-MCNC: <1.2 MG/DL
MICROALBUMIN/CREAT UR: NORMAL MG/G (ref 0–30)
MONOCYTES # BLD AUTO: 0.44 K/UL (ref 0–0.85)
MONOCYTES NFR BLD AUTO: 6.2 % (ref 0–13.4)
NEUTROPHILS # BLD AUTO: 4 K/UL (ref 2–7.15)
NEUTROPHILS NFR BLD: 56.6 % (ref 44–72)
NRBC # BLD AUTO: 0 K/UL
NRBC BLD-RTO: 0 /100 WBC
PHOSPHATE SERPL-MCNC: 3.5 MG/DL (ref 2.5–4.5)
PLATELET # BLD AUTO: 175 K/UL (ref 164–446)
PMV BLD AUTO: 13.9 FL (ref 9–12.9)
POTASSIUM SERPL-SCNC: 4.1 MMOL/L (ref 3.6–5.5)
PROT SERPL-MCNC: 7.4 G/DL (ref 6–8.2)
PTH-INTACT SERPL-MCNC: 48.9 PG/ML (ref 14–72)
RBC # BLD AUTO: 5.25 M/UL (ref 4.2–5.4)
SODIUM SERPL-SCNC: 140 MMOL/L (ref 135–145)
TRIGL SERPL-MCNC: 123 MG/DL (ref 0–149)
TSH SERPL DL<=0.005 MIU/L-ACNC: 0.59 UIU/ML (ref 0.38–5.33)
WBC # BLD AUTO: 7.1 K/UL (ref 4.8–10.8)

## 2022-01-06 PROCEDURE — 83970 ASSAY OF PARATHORMONE: CPT

## 2022-01-06 PROCEDURE — 80061 LIPID PANEL: CPT

## 2022-01-06 PROCEDURE — 82306 VITAMIN D 25 HYDROXY: CPT

## 2022-01-06 PROCEDURE — 84155 ASSAY OF PROTEIN SERUM: CPT

## 2022-01-06 PROCEDURE — 84075 ASSAY ALKALINE PHOSPHATASE: CPT

## 2022-01-06 PROCEDURE — 36415 COLL VENOUS BLD VENIPUNCTURE: CPT

## 2022-01-06 PROCEDURE — 80069 RENAL FUNCTION PANEL: CPT

## 2022-01-06 PROCEDURE — 99204 OFFICE O/P NEW MOD 45 MIN: CPT | Performed by: STUDENT IN AN ORGANIZED HEALTH CARE EDUCATION/TRAINING PROGRAM

## 2022-01-06 PROCEDURE — 82570 ASSAY OF URINE CREATININE: CPT

## 2022-01-06 PROCEDURE — 84460 ALANINE AMINO (ALT) (SGPT): CPT

## 2022-01-06 PROCEDURE — 84450 TRANSFERASE (AST) (SGOT): CPT

## 2022-01-06 PROCEDURE — 82247 BILIRUBIN TOTAL: CPT

## 2022-01-06 PROCEDURE — 84443 ASSAY THYROID STIM HORMONE: CPT

## 2022-01-06 PROCEDURE — 85025 COMPLETE CBC W/AUTO DIFF WBC: CPT

## 2022-01-06 PROCEDURE — 82043 UR ALBUMIN QUANTITATIVE: CPT

## 2022-01-06 PROCEDURE — 82248 BILIRUBIN DIRECT: CPT

## 2022-01-06 RX ORDER — ACYCLOVIR 400 MG/1
400 TABLET ORAL 2 TIMES DAILY
COMMUNITY
End: 2022-01-07 | Stop reason: SDUPTHER

## 2022-01-06 ASSESSMENT — PATIENT HEALTH QUESTIONNAIRE - PHQ9: CLINICAL INTERPRETATION OF PHQ2 SCORE: 0

## 2022-01-06 ASSESSMENT — FIBROSIS 4 INDEX: FIB4 SCORE: 1.69

## 2022-01-06 NOTE — TELEPHONE ENCOUNTER
The patient called stating she forgot to ask for refills and wanted to know if you could refill her medications.

## 2022-01-06 NOTE — LETTER
[employer]  Fatou Sherman D.O.  [address]  [place] [address]  Fax: [phone]   Authorization for Release/Disclosure of   Protected Health Information   Name: SUNNY JUNE : 1955 SSN: [id number]   Address: [address] [place] [address]  [place] [address] Phone:    [phone] (home)    I authorize the entity listed below to release/disclose the PHI below to:   [employer]/Fatou Sherman D.O. and Fatou Sherman D.O.   Provider or Entity Name: [facility] [place] ( Dr. Judy Scott)     Address   [facility], Zip: [address] [place] [address]   Phone: [phone]      Fax:[phone]     Reason for request: continuity of care   Information to be released:    [  ] LAST COLONOSCOPY,  including any PATH REPORT and follow-up  [  ] LAST FIT/COLOGUARD RESULT [  ] LAST DEXA  [  ] LAST MAMMOGRAM  [  ] LAST PAP  [  ] LAST LABS [  ] RETINA EXAM REPORT  [  ] IMMUNIZATION RECORDS  [ X ] Release all info      [  ] Check here and initial the line next to each item to release ALL health information INCLUDING  _____ Care and treatment for drug and / or alcohol abuse  _____ HIV testing, infection status, or AIDS  _____ Genetic Testing    DATES OF SERVICE OR TIME PERIOD TO BE DISCLOSED: _____________  I understand and acknowledge that:  * This Authorization may be revoked at any time by you in writing, except if your health information has already been used or disclosed.  * Your health information that will be used or disclosed as a result of you signing this authorization could be re-disclosed by the recipient. If this occurs, your re-disclosed health information may no longer be protected by State or Federal laws.  * You may refuse to sign this Authorization. Your refusal will not affect your ability to obtain treatment.  * This Authorization becomes effective upon signing and will  on (date) __________.      If no date is indicated, this Authorization will  one (1)  year from the signature date.    Name: Malena Dillon Borden    Signature: continuity of care   Date:     1/6/2022       PLEASE FAX REQUESTED RECORDS BACK TO: [phone]

## 2022-01-06 NOTE — PROGRESS NOTES
Subjective:     CC: Establish care    HISTORY OF THE PRESENT ILLNESS: Patient is a 66 y.o. female. This pleasant patient is here today to establish care. His/her prior PCP was Dr. Judy Scott.    Patient has no complaints or concerns today.  Does need medication refills.  Has nitroglycerin but denies chest pain.    No problem-specific Assessment & Plan notes found for this encounter.    Current Outpatient Medications Ordered in Epic   Medication Sig Dispense Refill   • acyclovir (ZOVIRAX) 400 MG tablet Take 1 Tablet by mouth 2 times a day. For suppression of herpes 180 Tablet 0   • atorvastatin (LIPITOR) 40 MG Tab Take 1 Tablet by mouth every day. 90 Tablet 3   • levothyroxine (SYNTHROID) 88 MCG Tab Take 1 Tablet by mouth every morning on an empty stomach. 90 Tablet 3   • nitroglycerin (NITROSTAT) 0.4 MG SL Tab Place 1 Tablet under the tongue as needed for Chest Pain. 25 Tablet 3   • metoprolol tartrate (LOPRESSOR) 25 MG Tab Take 0.5 Tablets by mouth 2 times a day. 90 Tablet 3   • Cyanocobalamin (VITAMIN B-12 PO) Take  by mouth.     • Chlorpheniramine Maleate (ALLERGY 4 HOUR PO) Take  by mouth.     • vitamin D (CHOLECALCIFEROL) 1000 UNIT Tab Take 1,000 Units by mouth every day.     • aspirin 81 MG tablet Take 81 mg by mouth every day.       No current [facility]-ordered facility-administered medications on file.     Health Maintenance: States her Cologuard was normal last year at [facility], [place], unable to give vaccines today in clinic since we do not have them. Will request records.    ROS:   Gen: no fevers/chills, no changes in weight  Eyes: no changes in vision  ENT: no sore throat, no hearing loss, no bloody nose  Pulm: no sob, no cough  CV: no chest pain, no palpitations  GI: no nausea/vomiting, no diarrhea  : no dysuria  MSk: no myalgias  Skin: no rash  Neuro: no headaches, no numbness/tingling  Heme/Lymph: no easy bruising      Objective:     Exam: /82 (BP Location: Left arm, Patient Position:  "Sitting, BP Cuff Size: Adult)   Pulse 71   Temp 36.6 °C (97.8 °F) (Temporal)   Resp 16   Ht 1.499 m (4' 11\")   Wt 64.5 kg (142 lb 3.2 oz)   SpO2 96%  Body mass index is 28.72 kg/m².    General: Well developed, well nourished in no acute distress.  Head: Normocephalic and atraumatic.  Eyes: Conjunctivae and extraocular motions are normal. Pupils are equal, round. No scleral icterus.   Ears:  External ears unremarkable. Tympanic membranes clear and intact.  Nose: Nares patent. No discharge.  Mouth/Throat: Oropharynx is clear and moist. Posterior pharynx without erythema or exudates.  Neck: Supple. Thyroid is not enlarged.  Pulmonary: Clear to ausculation.  Normal effort. No rales, ronchi, or wheezing.  Cardiovascular: Regular rate and rhythm without murmur.  Abdomen: Soft, nontender, nondistended. Normal bowel sounds. No HSM.  Neurologic: No gross/focal deficits. Normal gait.   Lymph: No cervical or supraclavicular lymph nodes are palpable  Skin: Warm and dry.  No obvious lesions.  Musculoskeletal: No extremity cyanosis, clubbing, or edema.  Psych: Normal mood and affect. Alert and oriented x3. Judgment and insight is normal.    Labs: Reviewed from 1/6/2021    Assessment & Plan:   66 y.o. female with the following -    1. Encounter to establish care  Medical record reviewed and updated with patient.    2. Hypothyroidism, unspecified type  This is a chronic condition, well controlled.  Continue levothyroxine as below.  - TSH WITH REFLEX TO FT4; Future  - levothyroxine (SYNTHROID) 88 MCG Tab; Take 1 Tablet by mouth every morning on an empty stomach.  Dispense: 90 Tablet; Refill: 3    3. Stage 3a chronic kidney disease (HCC)  This is a chronic condition overall stable.  Blood pressure at goal, not on ACE inhibitor/ARB as she has other indication for metoprolol.  Plan to repeat in 6 months.  Avoid NSAIDs.  - Renal Function Panel; Future  - MICROALBUMIN CREAT RATIO URINE; Future  - CBC WITH DIFFERENTIAL; Future  - " PTH INTACT (PTH ONLY); Future  - VITAMIN D,25 HYDROXY; Future    4. Essential hypertension, benign  This is a chronic condition, well controlled-continue current regimen.  - metoprolol tartrate (LOPRESSOR) 25 MG Tab; Take 0.5 Tablets by mouth 2 times a day.  Dispense: 90 Tablet; Refill: 3    5. Hyperlipidemia, mixed  This is a chronic condition, well-controlled, continue statin and aspirin.  - HEPATIC FUNCTION PANEL; Future  - Lipid Profile; Future    6. Coronary artery disease involving native coronary artery of native heart without angina pectoris  7. S/P CABG x 3  Patient doing excellent, provided with refill of nitroglycerin should she develop chest pain as prescribed by her cardiologist prior.  Continue follow-up with cardiology.  - atorvastatin (LIPITOR) 40 MG Tab; Take 1 Tablet by mouth every day.  Dispense: 90 Tablet; Refill: 3  - nitroglycerin (NITROSTAT) 0.4 MG SL Tab; Place 1 Tablet under the tongue as needed for Chest Pain.  Dispense: 25 Tablet; Refill: 3    8. Age-related osteoporosis without current pathological fracture  Overdue for DEXA scan, will obtain.  Vitamin D was normal.  Elevated alkaline phosphatase may be related.  - VITAMIN D,25 HYDROXY; Future  - DS-BONE DENSITY STUDY (DEXA); Future    10. History of herpes genitalis  Well-controlled with current regimen.  - acyclovir (ZOVIRAX) 400 MG tablet; Take 1 Tablet by mouth 2 times a day. For suppression of herpes  Dispense: 180 Tablet; Refill: 0    Return in about 1 month (around 2/6/2022) for Annual.    Please note that this dictation was created using voice recognition software. I have made every reasonable attempt to correct obvious errors, but I expect that there are errors of grammar and possibly content that I did not discover before finalizing the note.

## 2022-01-07 RX ORDER — LEVOTHYROXINE SODIUM 88 UG/1
TABLET ORAL
Qty: 30 TABLET | OUTPATIENT
Start: 2022-01-07

## 2022-01-07 RX ORDER — NITROGLYCERIN 0.4 MG/1
0.4 TABLET SUBLINGUAL PRN
Qty: 25 TABLET | Refills: 3 | Status: SHIPPED | OUTPATIENT
Start: 2022-01-07 | End: 2024-01-29 | Stop reason: SDUPTHER

## 2022-01-07 RX ORDER — NITROGLYCERIN 0.4 MG/1
0.4 TABLET SUBLINGUAL PRN
Qty: 25 TABLET | Refills: 3 | OUTPATIENT
Start: 2022-01-07

## 2022-01-07 RX ORDER — ACYCLOVIR 400 MG/1
400 TABLET ORAL 2 TIMES DAILY
OUTPATIENT
Start: 2022-01-07

## 2022-01-07 RX ORDER — ACYCLOVIR 400 MG/1
400 TABLET ORAL 2 TIMES DAILY
Qty: 180 TABLET | Refills: 0 | Status: SHIPPED | OUTPATIENT
Start: 2022-01-07 | End: 2023-02-08

## 2022-01-07 RX ORDER — ATORVASTATIN CALCIUM 40 MG/1
40 TABLET, FILM COATED ORAL DAILY
Qty: 90 TABLET | Refills: 3 | Status: SHIPPED | OUTPATIENT
Start: 2022-01-07 | End: 2022-02-01 | Stop reason: SDUPTHER

## 2022-01-07 RX ORDER — ATORVASTATIN CALCIUM 40 MG/1
40 TABLET, FILM COATED ORAL DAILY
Qty: 90 TABLET | Refills: 3 | OUTPATIENT
Start: 2022-01-07

## 2022-01-07 RX ORDER — LEVOTHYROXINE SODIUM 88 UG/1
88 TABLET ORAL
Qty: 90 TABLET | Refills: 3 | Status: SHIPPED | OUTPATIENT
Start: 2022-01-07 | End: 2023-02-08 | Stop reason: SDUPTHER

## 2022-01-24 ENCOUNTER — TELEPHONE (OUTPATIENT)
Dept: MEDICAL GROUP | Facility: PHYSICIAN GROUP | Age: 67
End: 2022-01-24

## 2022-01-24 NOTE — TELEPHONE ENCOUNTER
Left message for patient to call back regarding pre-visit planning. Please transfer call to [phone].

## 2022-02-01 ENCOUNTER — OFFICE VISIT (OUTPATIENT)
Dept: MEDICAL GROUP | Facility: PHYSICIAN GROUP | Age: 67
End: 2022-02-01
Payer: MEDICARE

## 2022-02-01 VITALS
BODY MASS INDEX: 28.63 KG/M2 | SYSTOLIC BLOOD PRESSURE: 134 MMHG | WEIGHT: 142 LBS | TEMPERATURE: 97.6 F | HEIGHT: 59 IN | RESPIRATION RATE: 16 BRPM | OXYGEN SATURATION: 94 % | HEART RATE: 60 BPM | DIASTOLIC BLOOD PRESSURE: 86 MMHG

## 2022-02-01 DIAGNOSIS — I25.10 CORONARY ARTERY DISEASE INVOLVING NATIVE CORONARY ARTERY OF NATIVE HEART WITHOUT ANGINA PECTORIS: ICD-10-CM

## 2022-02-01 DIAGNOSIS — N18.31 STAGE 3A CHRONIC KIDNEY DISEASE: ICD-10-CM

## 2022-02-01 DIAGNOSIS — E78.2 MIXED HYPERLIPIDEMIA: ICD-10-CM

## 2022-02-01 DIAGNOSIS — Z00.00 ENCOUNTER FOR ANNUAL GENERAL MEDICAL EXAMINATION WITHOUT ABNORMAL FINDINGS IN ADULT: ICD-10-CM

## 2022-02-01 DIAGNOSIS — Z95.1 S/P CABG X 3: ICD-10-CM

## 2022-02-01 DIAGNOSIS — R74.8 ELEVATED ALKALINE PHOSPHATASE LEVEL: ICD-10-CM

## 2022-02-01 DIAGNOSIS — I10 ESSENTIAL HYPERTENSION, BENIGN: ICD-10-CM

## 2022-02-01 DIAGNOSIS — L72.0 EIC (EPIDERMAL INCLUSION CYST): ICD-10-CM

## 2022-02-01 PROCEDURE — G0439 PPPS, SUBSEQ VISIT: HCPCS | Performed by: STUDENT IN AN ORGANIZED HEALTH CARE EDUCATION/TRAINING PROGRAM

## 2022-02-01 RX ORDER — ATORVASTATIN CALCIUM 40 MG/1
40 TABLET, FILM COATED ORAL DAILY
Qty: 100 TABLET | Refills: 3 | Status: SHIPPED | OUTPATIENT
Start: 2022-02-01 | End: 2023-02-08 | Stop reason: SDUPTHER

## 2022-02-01 ASSESSMENT — ENCOUNTER SYMPTOMS: GENERAL WELL-BEING: GOOD

## 2022-02-01 ASSESSMENT — ACTIVITIES OF DAILY LIVING (ADL): BATHING_REQUIRES_ASSISTANCE: 0

## 2022-02-01 ASSESSMENT — PATIENT HEALTH QUESTIONNAIRE - PHQ9: CLINICAL INTERPRETATION OF PHQ2 SCORE: 0

## 2022-02-01 ASSESSMENT — FIBROSIS 4 INDEX: FIB4 SCORE: 1.45

## 2022-02-01 NOTE — PROGRESS NOTES
Subjective:     CC: Annual    HPI:   Malena Borden is a 66 y.o. female who presents for annual exam. She is feeling well and denies any complaints.    Ob-Gyn/ History:    /Para:  G1  Gyn Surgery: Polyp removed, BTL at 27yo.  Not currently sexually active.  No significant bloating/fluid retention, pelvic pain. No vaginal discharge  Post-menopausal bleeding: none  Urinary incontinence: Rare overflow and stress, but not often-not wearing panty liner.    Health Maintenance  Advanced directive: Does not have yet.  Osteoporosis Screen/ DEXA: due, last  (osteoporosis), ordered 2022  Cholesterol Screenin2022  Diabetes Screenin2022   Aspirin Use: Continue for secondary prevention  Diet: Fairly good. Rare soda (Coke, mini).  Exercise: Has been walking.  Substance Abuse: Denies alcohol and other substances  Safe in relationship, lives with boyfriend.    Cancer screening  Colorectal Cancer Screening: Cologuard negative 2021, repeat in 3yrs  Lung Cancer Screening: not indicated  Cervical Cancer Screening: Aged out  Breast Cancer Screening: Due, 2022    Infectious disease screening/Immunizations  --Hepatitis C Screening: Negative prior, see media notes  --Immunizations:    Influenza: Due, recommended-declined   Tetanus: UTD   Shingles: Due, recommended-declines. Patient reports she received the zoster vaccine and one shingrix vaccine.   Pneumococcal : Series complete   Other immunizations: COVID series-declines    She  has a past medical history of CAD (coronary artery disease) (2014), Heart attack (HCC) (2014), High cholesterol, Hyperlipidemia, Hypertension, Indigestion, and Thyroid disease.  She  has a past surgical history that includes tubal ligation; pr  delivery only; zzz cardiac cath (2014); recovery (10/20/2014); multiple coronary artery bypass endo vein harvest (10/30/2014); other; and tubal coagulation laparoscopic bilateral.    Family  History   Problem Relation Age of Onset   • Lung Disease Mother         COPD   • Lung Disease Brother         COPD   • No Known Problems Son    • Colon Cancer Neg Hx    • Breast Cancer Neg Hx      Social History     Socioeconomic History   • Marital status: Single     Spouse name: Not on file   • Number of children: 1   • Years of education: Not on file   • Highest education level: Not on file   Occupational History   • Not on file   Tobacco Use   • Smoking status: Never Smoker   • Smokeless tobacco: Never Used   • Tobacco comment: passive exposure (mother, worked in a [employer])   Vaping Use   • Vaping Use: Never used   Substance and Sexual Activity   • Alcohol use: Not Currently   • Drug use: No   • Sexual activity: Not on file   Other Topics Concern   • Not on file   Social History Narrative    Lives with boyfriend. Has 3 dogs, getting a forth next week.          Social Determinants of Health     Financial Resource Strain:    • Difficulty of Paying Living Expenses: Not on file   Food Insecurity:    • Worried About Running Out of Food in the Last Year: Not on file   • Ran Out of Food in the Last Year: Not on file   Transportation Needs:    • Lack of Transportation (Medical): Not on file   • Lack of Transportation (Non-Medical): Not on file   Physical Activity:    • Days of Exercise per Week: Not on file   • Minutes of Exercise per Session: Not on file   Stress:    • Feeling of Stress : Not on file   Social Connections:    • Frequency of Communication with Friends and Family: Not on file   • Frequency of Social Gatherings with Friends and Family: Not on file   • Attends Protestant Services: Not on file   • Active Member of Clubs or Organizations: Not on file   • Attends Club or Organization Meetings: Not on file   • Marital Status: Not on file   Intimate Partner Violence:    • Fear of Current or Ex-Partner: Not on file   • Emotionally Abused: Not on file   • Physically Abused: Not on file   • Sexually Abused: Not on  file   Housing Stability:    • Unable to Pay for Housing in the Last Year: Not on file   • Number of Places Lived in the Last Year: Not on file   • Unstable Housing in the Last Year: Not on file     Patient Active Problem List    Diagnosis Date Noted   • Elevated alkaline phosphatase level 02/01/2022   • BMI 28.0-28.9,adult 02/01/2022   • EIC (epidermal inclusion cyst) 02/01/2022   • History of herpes genitalis 01/06/2022   • Hypothyroid 01/06/2022   • Stage 3a chronic kidney disease (HCC) 01/06/2022   • Age-related osteoporosis without current pathological fracture 01/06/2022   • S/P CABG x 3 11/21/2014   • Hyperlipidemia, mixed 09/19/2014   • Coronary artery disease involving native coronary artery without angina pectoris 06/26/2014   • Essential hypertension, benign 06/13/2014     Current Outpatient Medications   Medication Sig Dispense Refill   • atorvastatin (LIPITOR) 40 MG Tab Take 1 Tablet by mouth every day. 100 Tablet 3   • acyclovir (ZOVIRAX) 400 MG tablet Take 1 Tablet by mouth 2 times a day. For suppression of herpes 180 Tablet 0   • levothyroxine (SYNTHROID) 88 MCG Tab Take 1 Tablet by mouth every morning on an empty stomach. 90 Tablet 3   • nitroglycerin (NITROSTAT) 0.4 MG SL Tab Place 1 Tablet under the tongue as needed for Chest Pain. 25 Tablet 3   • metoprolol tartrate (LOPRESSOR) 25 MG Tab Take 0.5 Tablets by mouth 2 times a day. 90 Tablet 3   • Cyanocobalamin (VITAMIN B-12 PO) Take  by mouth.     • Chlorpheniramine Maleate (ALLERGY 4 HOUR PO) Take  by mouth.     • vitamin D (CHOLECALCIFEROL) 1000 UNIT Tab Take 1,000 Units by mouth every day.     • aspirin 81 MG tablet Take 81 mg by mouth every day.       No current facility-administered medications for this visit.     No Known Allergies    Review of Systems  Constitutional: Negative for fever, chills and malaise/fatigue.   HENT: Negative for congestion.    Eyes: Negative for pain.    Respiratory: Negative for cough and shortness of  "breath.  Cardiovascular: Negative for leg swelling.   Gastrointestinal: Negative for nausea, vomiting, abdominal pain and diarrhea.   Genitourinary: Negative for dysuria and hematuria.   Skin: Negative for rash.  Patient does have a bump on her left lower back that she like me to look at, growing slowly in that time she squeezes discharge out of it.  Neurological: Negative for dizziness, focal weakness and headaches.   Endo/Heme/Allergies: Does not bleed easily.   Psychiatric/Behavioral: Negative for depression.  The patient is not nervous/anxious.      Objective:     /86 (BP Location: Left arm, Patient Position: Sitting, BP Cuff Size: Adult)   Pulse 60   Temp 36.4 °C (97.6 °F) (Temporal)   Resp 16   Ht 1.499 m (4' 11\")   Wt 64.4 kg (142 lb)   SpO2 94%   BMI 28.68 kg/m²   Body mass index is 28.68 kg/m².  Wt Readings from Last 4 Encounters:   02/01/22 64.4 kg (142 lb)   01/06/22 64.5 kg (142 lb 3.2 oz)   09/14/21 64.9 kg (143 lb)   09/11/20 65.3 kg (144 lb)     Physical Exam:  Constitutional: Well-developed and well-nourished. Not diaphoretic. No distress.   Skin: Skin is warm and dry. No rash noted.  Nontender, nonfluctuant skin colored nodule with tiny punctum noted on left lower back.  Head: Atraumatic without lesions.  Eyes: Conjunctivae and extraocular motions are normal. Pupils are equal, round, and reactive to light. No scleral icterus.   Ears:  External ears unremarkable. Tympanic membranes clear and intact.  Nose: Nares patent. Septum midline. Turbinates without erythema nor edema. No discharge.   Mouth/Throat: Dentition is good. Tongue normal. Oropharynx is clear and moist. Posterior pharynx without erythema or exudates.  Neck: Supple, trachea midline. Normal range of motion. No thyromegaly present. No lymphadenopathy--cervical or supraclavicular.  Cardiovascular: Regular rate and rhythm, S1 and S2 without murmur, rubs, or gallops.  Lungs: Normal inspiratory effort, CTA bilaterally, no " wheezes/rhonchi/rales  Breast: Declines exam  Abdomen: Soft, non tender, and without distention. Active bowel sounds in all four quadrants. No rebound, guarding, masses or HSM.  Extremities: No cyanosis, clubbing, erythema, nor edema.   Neurological: Alert and oriented x 3.  No gross/focal deficits.  Gait normal.  Psychiatric:  Behavior, mood, and affect are appropriate.    Labs: Reviewed from 1/6/2022    Assessment and Plan:     1. Encounter for annual general medical examination without abnormal findings in adult  HCM: Reviewed as above.  Immunizations recommended as above and declined.  Age-appropriate anticipatory guidance discussed.    2. Stage 3a chronic kidney disease (HCC)  This is a chronic condition, overall stable.  Encouraged hydration.  Avoid NSAIDs.  Trend in 6 months.  - Basic Metabolic Panel; Future    3. Essential hypertension, benign  This is a chronic condition, very well controlled last visit and slightly elevated today.  Given CKD as above, discussed goal blood pressures and return precautions.  If out of range would consider adding ACE inhibitor/ARB to current regimen.    4. Hyperlipidemia, mixed  5. S/P CABG x 3  6. Coronary artery disease involving native coronary artery of native heart without angina pectoris  This is a chronic addition, well controlled near goal of less than 70 for LDL.  Patient has follow-up with her cardiologist, defer changes to specialist.  For now continue Lipitor and aspirin at current doses for secondary prevention.  - atorvastatin (LIPITOR) 40 MG Tab; Take 1 Tablet by mouth every day.  Dispense: 100 Tablet; Refill: 3    7. Elevated alkaline phosphatase level  This is chronic condition, stable.  Given history of osteoporosis remotely I suspect that that is related as she has no other liver inflammation.  Encouraged her to schedule her DEXA scan.  - ALKALINE PHOSPHATASE ISOENZYMES; Future    8. BMI 28.0-28.9,adult  This is a chronic condition.  We discussed her diet  and exercise.  Will trend.    9. EIC (epidermal inclusion cyst)  This is a chronic condition, discussed benign nature and return precautions.    Follow-up: Return in about 6 months (around 8/1/2022), or if symptoms worsen or fail to improve.

## 2022-02-02 NOTE — PROGRESS NOTES
Chief Complaint   Patient presents with   • Annual Exam       HPI:  Malena is a 66 y.o. here for Medicare Annual Wellness Visit    Patient Active Problem List    Diagnosis Date Noted   • Elevated alkaline phosphatase level 02/01/2022   • BMI 28.0-28.9,adult 02/01/2022   • EIC (epidermal inclusion cyst) 02/01/2022   • History of herpes genitalis 01/06/2022   • Hypothyroid 01/06/2022   • Stage 3a chronic kidney disease (HCC) 01/06/2022   • Age-related osteoporosis without current pathological fracture 01/06/2022   • S/P CABG x 3 11/21/2014   • Hyperlipidemia, mixed 09/19/2014   • Coronary artery disease involving native coronary artery without angina pectoris 06/26/2014   • Essential hypertension, benign 06/13/2014     Current Outpatient Medications   Medication Sig Dispense Refill   • atorvastatin (LIPITOR) 40 MG Tab Take 1 Tablet by mouth every day. 100 Tablet 3   • acyclovir (ZOVIRAX) 400 MG tablet Take 1 Tablet by mouth 2 times a day. For suppression of herpes 180 Tablet 0   • levothyroxine (SYNTHROID) 88 MCG Tab Take 1 Tablet by mouth every morning on an empty stomach. 90 Tablet 3   • nitroglycerin (NITROSTAT) 0.4 MG SL Tab Place 1 Tablet under the tongue as needed for Chest Pain. 25 Tablet 3   • metoprolol tartrate (LOPRESSOR) 25 MG Tab Take 0.5 Tablets by mouth 2 times a day. 90 Tablet 3   • Cyanocobalamin (VITAMIN B-12 PO) Take  by mouth.     • Chlorpheniramine Maleate (ALLERGY 4 HOUR PO) Take  by mouth.     • vitamin D (CHOLECALCIFEROL) 1000 UNIT Tab Take 1,000 Units by mouth every day.     • aspirin 81 MG tablet Take 81 mg by mouth every day.       No current facility-administered medications for this visit.        Patient is taking medications as noted in medication list.  Current supplements as per medication list.     Allergies: Patient has no known allergies.    Current social contact/activities:  Walks dogs, talks to family and friends     Is patient current with immunizations? No, due for FLU and  SHINGRIX (Shingles). Patient is not interesting in receiving today and NONE today.      She  reports that she has never smoked. She has never used smokeless tobacco. She reports previous alcohol use. She reports that she does not use drugs.  Counseling given: Not Answered  Comment: passive exposure (mother, worked in a [employer])    DPA/Advanced directive: Patient does not have an Advanced Directive.  A packet and workshop information was given on Advanced Directives.    ROS:    Gait: Uses no assistive device   Ostomy: No   Other tubes: No   Amputations: No   Chronic oxygen use No   Last eye exam January 2020  Wears hearing aids: No   : Denies any urinary leakage during the last 6 months      Screening:    Depression Screening    Little interest or pleasure in doing things?  0 - not at all  Feeling down, depressed, or hopeless? 0 - not at all  Patient Health Questionnaire Score: 0    If depressive symptoms identified deferred to follow up visit unless specifically addressed in assessment and plan.    Interpretation of PHQ-9 Total Score   Score Severity   1-4 No Depression   5-9 Mild Depression   10-14 Moderate Depression   15-19 Moderately Severe Depression   20-27 Severe Depression    Screening for Cognitive Impairment    Three Minute Recall (captain, garden, picture)  3/3    Suresh clock face with all 12 numbers and set the hands to show 5 past 8.  Yes    If cognitive concerns identified, deferred for follow up unless specifically addressed in assessment and plan.    Fall Risk Assessment    Has the patient had two or more falls in the last year or any fall with injury in the last year?  No  If fall risk identified, deferred for follow up unless specifically addressed in assessment and plan.    Safety Assessment    Throw rugs on floor.  Yes  Handrails on all stairs.  Yes  Good lighting in all hallways.  Yes  Difficulty hearing.  No  Patient counseled about all safety risks that were identified.    Functional  Assessment ADLs    Are there any barriers preventing you from cooking for yourself or meeting nutritional needs?  No.    Are there any barriers preventing you from driving safely or obtaining transportation?  No.    Are there any barriers preventing you from using a telephone or calling for help?  No.    Are there any barriers preventing you from shopping?  No.    Are there any barriers preventing you from taking care of your own finances?  No.    Are there any barriers preventing you from managing your medications?  No.    Are there any barriers preventing you from showering, bathing or dressing yourself?  No.    Are you currently engaging in any exercise or physical activity?  Yes.     What is your perception of your health?  Good.    Health Maintenance Summary          Overdue - Annual Wellness Visit (Once) Overdue - never done    No completion history exists for this topic.          Ordered - BONE DENSITY (Every 5 Years) Ordered on 1/6/2022 09/21/2012  DS-BONE DENSITY STUDY (DEXA)          Postponed - IMM ZOSTER VACCINES (2 of 3) Postponed until 7/1/2022 12/30/2014  Imm Admin: Zoster Vaccine Live (ZVL) (Zostavax) - HISTORICAL DATA          Postponed - COVID-19 Vaccine (1) Postponed until 1/6/2023    No completion history exists for this topic.          Postponed - IMM INFLUENZA (1) Postponed until 2/1/2023    No completion history exists for this topic.          MAMMOGRAM (Yearly) Tentatively due on 7/13/2022 07/13/2021  MA-SCREENING MAMMO BILAT W/TOMOSYNTHESIS W/CAD    06/27/2020  MA-SCREENING MAMMO BILAT W/TOMOSYNTHESIS W/CAD    05/21/2019  MA-SCREENING MAMMO BILAT W/CAD    12/07/2016  MA-MAMMO SCREENING BILAT W/MARIA DOLORES W/CAD    11/11/2015  MA-SCREEN MAMMO W/CAD-BILAT    Only the first 5 history entries have been loaded, but more history exists.          COLORECTAL CANCER SCREENING (COLOGUARD STOOL DNA - Every 3 Years) Next due on 1/21/2024 01/21/2021  COLOGUARD STOOL DNA (Done)          IMM  DTaP/Tdap/Td Vaccine (2 - Td or Tdap) Next due on 4/8/2025 04/08/2015  Imm Admin: Tdap Vaccine          IMM PNEUMOCOCCAL VACCINE: 65+ Years (Series Information) Completed    01/06/2020  Imm Admin: Pneumococcal polysaccharide vaccine (PPSV-23)    04/08/2015  Imm Admin: Pneumococcal Conjugate Vaccine (Prevnar/PCV-13)          HEPATITIS C SCREENING  Completed    01/06/2021  HEP C VIRUS ANTIBODY          IMM HEP B VACCINE (Series Information) Aged Out    No completion history exists for this topic.          IMM MENINGOCOCCAL VACCINE (MCV4) (Series Information) Aged Out    No completion history exists for this topic.          Discontinued - PAP SMEAR  Discontinued    No completion history exists for this topic.              Patient Care Team:  Fatou Sherman D.O. as PCP - General (Family Medicine)  Judy Scott M.D. as PCP - [facility]  Manuel Rene M.D. as Consulting Physician (Interventional Cardiology)    Social History     Tobacco Use   • Smoking status: Never Smoker   • Smokeless tobacco: Never Used   • Tobacco comment: passive exposure (mother, worked in a [employer])   Vaping Use   • Vaping Use: Never used   Substance Use Topics   • Alcohol use: Not Currently   • Drug use: No     Family History   Problem Relation Age of Onset   • Lung Disease Mother         COPD   • Lung Disease Brother         COPD   • No Known Problems Son    • Colon Cancer Neg Hx    • Breast Cancer Neg Hx      She  has a past medical history of CAD (coronary artery disease) (June 11 2014), Heart attack (HCC) (June 11, 2014), High cholesterol, Hyperlipidemia, Hypertension, Indigestion, and Thyroid disease.   Past Surgical History:   Procedure Laterality Date   • MULTIPLE CORONARY ARTERY BYPASS ENDO VEIN HARVEST  10/30/2014    Performed by Mando Vieira M.D. at SURGERY [facility] ORS   • RECOVERY  10/20/2014    Performed by Cath-Recovery Surgery at SURGERY SAME DAY [facility] ORS   • JIM CARDIAC CATH  June 11, 2014   • OTHER       "polyp removal (cervical?)   • AR  DELIVERY ONLY     • TUBAL COAGULATION LAPAROSCOPIC BILATERAL      27yo   • TUBAL LIGATION       Exam:     /86 (BP Location: Left arm, Patient Position: Sitting, BP Cuff Size: Adult)   Pulse 60   Temp 36.4 °C (97.6 °F) (Temporal)   Resp 16   Ht 1.499 m (4' 11\")   Wt 64.4 kg (142 lb)   SpO2 94%  Body mass index is 28.68 kg/m².    Hearing good.    Dentition good  Alert, oriented in no acute distress.  Eye contact is good, speech goal directed, affect calm    Assessment and Plan. The following treatment and monitoring plan is recommended:  See provider note.  1. Encounter for annual general medical examination without abnormal findings in adult     2. Stage 3a chronic kidney disease (HCC)  Basic Metabolic Panel   3. Essential hypertension, benign     4. Hyperlipidemia, mixed     5. S/P CABG x 3  atorvastatin (LIPITOR) 40 MG Tab   6. Elevated alkaline phosphatase level  ALKALINE PHOSPHATASE ISOENZYMES   7. Coronary artery disease involving native coronary artery of native heart without angina pectoris  atorvastatin (LIPITOR) 40 MG Tab   8. BMI 28.0-28.9,adult     9. EIC (epidermal inclusion cyst)       Services suggested: No services needed at this time    Discussion today about general wellness and lifestyle habits:    · Engage in regular physical activity and social activities     Follow-up: Return in about 6 months (around 2022), or if symptoms worsen or fail to improve.    "

## 2022-02-02 NOTE — PATIENT INSTRUCTIONS
Blood pressure goal <130/80 ideally. If above 140/90, let me know.    Calcium 1200mg (in diet is best) daily.    Alendronate tablets  What is this medicine?  ALENDRONATE (a ROHINI droe diana) slows calcium loss from bones. It helps to make normal healthy bone and to slow bone loss in people with Paget's disease and osteoporosis. It may be used in others at risk for bone loss.  This medicine may be used for other purposes; ask your health care provider or pharmacist if you have questions.  COMMON BRAND NAME(S): Fosamax  What should I tell my health care provider before I take this medicine?  They need to know if you have any of these conditions:  · dental disease  · esophagus, stomach, or intestine problems, like acid reflux or GERD  · kidney disease  · low blood calcium  · low vitamin D  · problems sitting or standing 30 minutes  · trouble swallowing  · an unusual or allergic reaction to alendronate, other medicines, foods, dyes, or preservatives  · pregnant or trying to get pregnant  · breast-feeding  How should I use this medicine?  You must take this medicine exactly as directed or you will lower the amount of the medicine you absorb into your body or you may cause yourself harm. Take this medicine by mouth first thing in the morning, after you are up for the day. Do not eat or drink anything before you take your medicine. Swallow the tablet with a full glass (6 to 8 fluid ounces) of plain water. Do not take this medicine with any other drink. Do not chew or crush the tablet. After taking this medicine, do not eat breakfast, drink, or take any medicines or vitamins for at least 30 minutes. Sit or stand up for at least 30 minutes after you take this medicine; do not lie down. Do not take your medicine more often than directed.  Talk to your pediatrician regarding the use of this medicine in children. Special care may be needed.  Overdosage: If you think you have taken too much of this medicine contact a poison control  center or emergency room at once.  NOTE: This medicine is only for you. Do not share this medicine with others.  What if I miss a dose?  If you miss a dose, do not take it later in the day. Continue your normal schedule starting the next morning. Do not take double or extra doses.  What may interact with this medicine?  · aluminum hydroxide  · antacids  · aspirin  · calcium supplements  · drugs for inflammation like ibuprofen, naproxen, and others  · iron supplements  · magnesium supplements  · vitamins with minerals  This list may not describe all possible interactions. Give your health care provider a list of all the medicines, herbs, non-prescription drugs, or dietary supplements you use. Also tell them if you smoke, drink alcohol, or use illegal drugs. Some items may interact with your medicine.  What should I watch for while using this medicine?  Visit your doctor or health care professional for regular checks ups. It may be some time before you see benefit from this medicine. Do not stop taking your medicine except on your doctor's advice. Your doctor or health care professional may order blood tests and other tests to see how you are doing.  You should make sure you get enough calcium and vitamin D while you are taking this medicine, unless your doctor tells you not to. Discuss the foods you eat and the vitamins you take with your health care professional.  Some people who take this medicine have severe bone, joint, and/or muscle pain. This medicine may also increase your risk for a broken thigh bone. Tell your doctor right away if you have pain in your upper leg or groin. Tell your doctor if you have any pain that does not go away or that gets worse.  This medicine can make you more sensitive to the sun. If you get a rash while taking this medicine, sunlight may cause the rash to get worse. Keep out of the sun. If you cannot avoid being in the sun, wear protective clothing and use sunscreen. Do not use sun  lamps or tanning beds/booths.  What side effects may I notice from receiving this medicine?  Side effects that you should report to your doctor or health care professional as soon as possible:  · allergic reactions like skin rash, itching or hives, swelling of the face, lips, or tongue  · black or tarry stools  · bone, muscle or joint pain  · changes in vision  · chest pain  · heartburn or stomach pain  · jaw pain, especially after dental work  · pain or trouble when swallowing  · redness, blistering, peeling or loosening of the skin, including inside the mouth  Side effects that usually do not require medical attention (report to your doctor or health care professional if they continue or are bothersome):  · changes in taste  · diarrhea or constipation  · eye pain or itching  · headache  · nausea or vomiting  · stomach gas or fullness  This list may not describe all possible side effects. Call your doctor for medical advice about side effects. You may report side effects to FDA at 7-754-ECW-7879.  Where should I keep my medicine?  Keep out of the reach of children.  Store at room temperature of 15 and 30 degrees C (59 and 86 degrees F). Throw away any unused medicine after the expiration date.  NOTE: This sheet is a summary. It may not cover all possible information. If you have questions about this medicine, talk to your doctor, pharmacist, or health care provider.  © 2020 [employer] (2012-06-15 08:56:09)

## 2022-03-22 ENCOUNTER — APPOINTMENT (OUTPATIENT)
Dept: RADIOLOGY | Facility: MEDICAL CENTER | Age: 67
End: 2022-03-22
Payer: MEDICARE

## 2022-03-22 ENCOUNTER — HOSPITAL ENCOUNTER (EMERGENCY)
Facility: MEDICAL CENTER | Age: 67
End: 2022-03-22
Attending: EMERGENCY MEDICINE
Payer: MEDICARE

## 2022-03-22 VITALS
DIASTOLIC BLOOD PRESSURE: 80 MMHG | SYSTOLIC BLOOD PRESSURE: 150 MMHG | OXYGEN SATURATION: 97 % | HEART RATE: 59 BPM | BODY MASS INDEX: 28.05 KG/M2 | TEMPERATURE: 97.8 F | WEIGHT: 138.89 LBS | RESPIRATION RATE: 18 BRPM

## 2022-03-22 DIAGNOSIS — R07.9 CHEST PAIN, UNSPECIFIED TYPE: ICD-10-CM

## 2022-03-22 DIAGNOSIS — R11.2 NON-INTRACTABLE VOMITING WITH NAUSEA, UNSPECIFIED VOMITING TYPE: ICD-10-CM

## 2022-03-22 LAB
ALBUMIN SERPL BCP-MCNC: 4.2 G/DL (ref 3.2–4.9)
ALBUMIN/GLOB SERPL: 1.7 G/DL
ALP SERPL-CCNC: 136 U/L (ref 30–99)
ALT SERPL-CCNC: 58 U/L (ref 2–50)
ANION GAP SERPL CALC-SCNC: 13 MMOL/L (ref 7–16)
AST SERPL-CCNC: 101 U/L (ref 12–45)
BASOPHILS # BLD AUTO: 0.5 % (ref 0–1.8)
BASOPHILS # BLD: 0.04 K/UL (ref 0–0.12)
BILIRUB SERPL-MCNC: 0.5 MG/DL (ref 0.1–1.5)
BUN SERPL-MCNC: 20 MG/DL (ref 8–22)
CALCIUM SERPL-MCNC: 9.5 MG/DL (ref 8.5–10.5)
CHLORIDE SERPL-SCNC: 105 MMOL/L (ref 96–112)
CO2 SERPL-SCNC: 23 MMOL/L (ref 20–33)
CREAT SERPL-MCNC: 1.03 MG/DL (ref 0.5–1.4)
EKG IMPRESSION: NORMAL
EOSINOPHIL # BLD AUTO: 0.46 K/UL (ref 0–0.51)
EOSINOPHIL NFR BLD: 5.7 % (ref 0–6.9)
ERYTHROCYTE [DISTWIDTH] IN BLOOD BY AUTOMATED COUNT: 39.4 FL (ref 35.9–50)
GFR SERPLBLD CREATININE-BSD FMLA CKD-EPI: 59 ML/MIN/1.73 M 2
GLOBULIN SER CALC-MCNC: 2.5 G/DL (ref 1.9–3.5)
GLUCOSE SERPL-MCNC: 115 MG/DL (ref 65–99)
HCT VFR BLD AUTO: 42.6 % (ref 37–47)
HGB BLD-MCNC: 14.2 G/DL (ref 12–16)
IMM GRANULOCYTES # BLD AUTO: 0.04 K/UL (ref 0–0.11)
IMM GRANULOCYTES NFR BLD AUTO: 0.5 % (ref 0–0.9)
LIPASE SERPL-CCNC: 57 U/L (ref 11–82)
LYMPHOCYTES # BLD AUTO: 1.47 K/UL (ref 1–4.8)
LYMPHOCYTES NFR BLD: 18.3 % (ref 22–41)
MCH RBC QN AUTO: 29.8 PG (ref 27–33)
MCHC RBC AUTO-ENTMCNC: 33.3 G/DL (ref 33.6–35)
MCV RBC AUTO: 89.3 FL (ref 81.4–97.8)
MONOCYTES # BLD AUTO: 0.38 K/UL (ref 0–0.85)
MONOCYTES NFR BLD AUTO: 4.7 % (ref 0–13.4)
NEUTROPHILS # BLD AUTO: 5.66 K/UL (ref 2–7.15)
NEUTROPHILS NFR BLD: 70.3 % (ref 44–72)
NRBC # BLD AUTO: 0 K/UL
NRBC BLD-RTO: 0 /100 WBC
PLATELET # BLD AUTO: 145 K/UL (ref 164–446)
PMV BLD AUTO: 13.2 FL (ref 9–12.9)
POTASSIUM SERPL-SCNC: 3.9 MMOL/L (ref 3.6–5.5)
PROT SERPL-MCNC: 6.7 G/DL (ref 6–8.2)
RBC # BLD AUTO: 4.77 M/UL (ref 4.2–5.4)
SODIUM SERPL-SCNC: 141 MMOL/L (ref 135–145)
TROPONIN T SERPL-MCNC: 6 NG/L (ref 6–19)
TROPONIN T SERPL-MCNC: 7 NG/L (ref 6–19)
WBC # BLD AUTO: 8.1 K/UL (ref 4.8–10.8)

## 2022-03-22 PROCEDURE — 80053 COMPREHEN METABOLIC PANEL: CPT

## 2022-03-22 PROCEDURE — 71045 X-RAY EXAM CHEST 1 VIEW: CPT

## 2022-03-22 PROCEDURE — 99284 EMERGENCY DEPT VISIT MOD MDM: CPT

## 2022-03-22 PROCEDURE — 85025 COMPLETE CBC W/AUTO DIFF WBC: CPT

## 2022-03-22 PROCEDURE — 84484 ASSAY OF TROPONIN QUANT: CPT

## 2022-03-22 PROCEDURE — 83690 ASSAY OF LIPASE: CPT

## 2022-03-22 PROCEDURE — 93005 ELECTROCARDIOGRAM TRACING: CPT

## 2022-03-22 PROCEDURE — 36415 COLL VENOUS BLD VENIPUNCTURE: CPT

## 2022-03-22 PROCEDURE — 93005 ELECTROCARDIOGRAM TRACING: CPT | Performed by: EMERGENCY MEDICINE

## 2022-03-22 ASSESSMENT — PAIN DESCRIPTION - DESCRIPTORS: DESCRIPTORS: PRESSURE

## 2022-03-22 ASSESSMENT — FIBROSIS 4 INDEX: FIB4 SCORE: 1.45

## 2022-03-22 NOTE — ED NOTES
"Malena Borden  66 y.o. female  Chief Complaint   Patient presents with   • Chest Pain     BIB EMS for chest pain x15 min. Pain associated with pressure and episode of emesis, pt took 3 nitro and ASA which provided relief. Pt reports hx of MI with \"quadruple bypass.\" Pt denies symptoms at this time.        Pt to triage with family.    Pt is alert and oriented, speaking in full sentences, follows commands and responds appropriately to questions. Resp are even and unlabored. No behavioral indicators of pain.     Labs drawn by this RN, EKG in progress.    Pt placed in lobby. Pt educated on triage process. Pt encouraged to alert staff for any changes. This RN masked and in appropriate PPE during encounter.     "

## 2022-03-23 NOTE — ED PROVIDER NOTES
"ED Provider Note    CHIEF COMPLAINT  Chief Complaint   Patient presents with   • Chest Pain     BIB EMS for chest pain x15 min. Pain associated with pressure and episode of emesis, pt took 3 nitro and ASA which provided relief. Pt reports hx of MI with \"quadruple bypass.\" Pt denies symptoms at this time.        HPI  Malena Borden is a 66 y.o. female who presents to the emergency department chief complaint of chest discomfort.  Patient states around 3 PM after she had eaten a bowl of top Ramen she had central upper abdominal lower chest pressure-like discomfort that radiated to the arms and associated with nausea and vomiting.  She states it felt nothing like her last heart attack which led to her quadruple bypass she said that actually is more indigestion-like symptoms.  She took 3 nitroglycerin before the symptoms resolved at home.  She states a daily aspirin.  She states currently she feels well she no longer has any symptoms.  Before today she has been doing well no chest pain or dyspnea on exertion or unilateral bilateral leg swelling.  She follows up with Dr. Rene with cardiology.    REVIEW OF SYSTEMS  Positives as above. Pertinent negatives include flank pain dysuria hematuria dyspnea on exertion unilateral bilateral leg swelling bloody stools bloody emesis chest pain or dyspnea on exertion  All other review of systems are negative    PAST MEDICAL HISTORY   has a past medical history of CAD (coronary artery disease) (June 11 2014), Heart attack (HCC) (June 11, 2014), High cholesterol, Hyperlipidemia, Hypertension, Indigestion, and Thyroid disease.    SOCIAL HISTORY  Social History     Tobacco Use   • Smoking status: Never Smoker   • Smokeless tobacco: Never Used   • Tobacco comment: passive exposure (mother, worked in a [email or web address])   Vaping Use   • Vaping Use: Never used   Substance and Sexual Activity   • Alcohol use: Not Currently   • Drug use: No   • Sexual activity: Not on file       SURGICAL HISTORY   " has a past surgical history that includes tubal ligation;  delivery only; zzz cardiac cath (2014); recovery (10/20/2014); multiple coronary artery bypass endo vein harvest (10/30/2014); other; and tubal coagulation laparoscopic bilateral.    CURRENT MEDICATIONS  Home Medications     Reviewed by Fariba Sams R.N. (Registered Nurse) on 22 at 1642  Med List Status: Partial   Medication Last Dose Status   acyclovir (ZOVIRAX) 400 MG tablet  Active   aspirin 81 MG tablet  Active   atorvastatin (LIPITOR) 40 MG Tab  Active   Chlorpheniramine Maleate (ALLERGY 4 HOUR PO)  Active   Cyanocobalamin (VITAMIN B-12 PO)  Active   levothyroxine (SYNTHROID) 88 MCG Tab  Active   metoprolol tartrate (LOPRESSOR) 25 MG Tab  Active   nitroglycerin (NITROSTAT) 0.4 MG SL Tab  Active   vitamin D (CHOLECALCIFEROL) 1000 UNIT Tab  Active                ALLERGIES  No Known Allergies    PHYSICAL EXAM  VITAL SIGNS: BP (!) 193/81   Pulse 64   Temp 36.5 °C (97.7 °F) (Temporal)   Resp (!) 22   Wt 63 kg (138 lb 14.2 oz)   SpO2 97%   BMI 28.05 kg/m²    Pulse ox interpretation: I interpret this pulse ox as normal.  Constitutional: Alert in no apparent distress.  HENT: Normocephalic atraumatic, MMM  Eyes: PER, Conjunctiva normal, Non-icteric.   Neck: Normal range of motion, No tenderness, Supple, No stridor.   Cardiovascular: Regular rate and rhythm, no murmurs.   Thorax & Lungs: Normal breath sounds, No respiratory distress, No wheezing, No chest tenderness.   Abdomen: Bowel sounds normal, Soft, No tenderness, No pulsatile masses. No peritoneal signs.  Skin: Warm, Dry, No erythema, No rash.   Back: No bony tenderness, No CVA tenderness.   Extremities/MSK: Intact equal distal pulses, No edema, No tenderness, No cyanosis, no major deformities noted  Neurologic: Alert and oriented x3, No focal deficits noted.       DIFFERENTIAL DIAGNOSIS AND WORK UP PLAN    This is a 66 y.o. female who presents with unfortunate chest  discomfort prior to arrival with nausea and vomiting A. fib and indigestion but also given ACS especially in light of back that was pressure-like and radiated to the arms, she is otherwise well-appearing at this time and states she feels fine she does not believe that her blood pressure is actually that elevated.  He longer has any complaint symptoms again around 3 PM.  Fortunately her first cardiac enzyme is within normal limits also add a lipase onto her blood work her EKG appears unchanged from prior.  She is currently resting comfortably she is already received aspirin the plan is for repeat cardiac enzyme and then reassessment.    DIAGNOSTIC STUDIES / PROCEDURES    EKG  Results for orders placed or performed during the hospital encounter of 22   EKG   Result Value Ref Range    Report       [facility] Emergency Dept.    Test Date:  2022  Pt Name:    SUNNY JUNE                 Department: ER  MRN:        [id number]                      Room:  Gender:     Female                       Technician: [id number]  :        [id number]                   Requested By:ER TRIAGE PROTOCOL  Order #:    [id number]                    Reading MD: Devika Mcduffie MD    Measurements  Intervals                                Axis  Rate:       64                           P:          40  WY:         132                          QRS:        -38  QRSD:       88                           T:          104  QT:         432  QTc:        446    Interpretive Statements  Sinus rhythm at a rate of 64 no ST elevations or ST depressions RSR prime in  leads V1 V2 unchanged from prior with T wave inversions in leads I and aVL  also  unchanged from prior no pathognomonic Q waves normal intervals borderline  left  axis deviation  Compared to ECG 2019 12:29:24  No significant change   Electronically Signed On 3- 18:28:14 PDT by Devika Mcduffie MD         LABS  Pertinent Lab Findings  CBC within normal limits  panel mild normal cytopenia, CMP with mildly elevated LFTs troponin normal x1 lipase normal  Repeat trop normal       RADIOLOGY  DX-CHEST-PORTABLE (1 VIEW)   Final Result         1. No acute cardiopulmonary abnormalities are identified.        The radiologist's interpretation of all radiological studies have been reviewed by me.      COURSE & MEDICAL DECISION MAKING  Pertinent Labs & Imaging studies reviewed. (See chart for details)    6:28 PM  Patient's troponin is within normal limits she is resting comfortably currently has no symptoms.  She does not believe that her blood pressure is actually this elevated.  We discussed the plan for repeat troponin and at this time she understands feels comfortable with the plan    7:24 PM  I reassessed patient at the bedside she is resting comfortably.  Repeat troponin at 2 hours from last draw is within normal limits this is approximately 4 hours from the onset of symptoms.  Her EKG is unremarkable she states she feels better after the nitro her blood pressure is a little elevated but she thinks is the cough is actually making her uncomfortable and making her more anxious.  She feels comfortable going home she was to go home we did discuss the risks due to her history of cardiac disease but at this time there is no acute change in her EKG and troponin negative x2.  She will call Dr. Enriquez in the morning for follow-up and understand strict return precautions.  She feels comfortable and wishes to go home  Low concern pulmonary embolism patient's Wells score 0    Patient's heart score is 5    I verified that the patient was wearing a mask and I was wearing appropriate PPE every time I entered the room. The patient's mask was on the patient at all times during my encounter except for a brief view of the oropharynx.    The patient will return for new or worsening symptoms and is stable at the time of discharge.    The patient is referred to a primary physician for blood pressure  management, diabetic screening, and for all other preventative health concerns.    DISPOSITION:  Patient will be discharged home in stable condition.    FOLLOW UP:  Fatou Sherman D.O.  [address]  [address]  [place] [facility] [id number] [phone]    Schedule an appointment as soon as possible for a visit       Manuel Rene M.D.  [address]  [address]  [facility] [id number] [phone]    Call on 3/23/2022      [facility], Emergency Dept  [address] [id number] [phone]    If symptoms worsen      OUTPATIENT MEDICATIONS:  New Prescriptions    No medications on file             FINAL IMPRESSION  1. Chest pain, unspecified type     2. Non-intractable vomiting with nausea, unspecified vomiting type             Electronically signed by: Devika Mcduffie M.D., 3/22/2022 6:24 PM    This dictation has been created using voice recognition software and/or scribes. The accuracy of the dictation is limited by the abilities of the software and the expertise of the scribes. I expect there may be some errors of grammar and possibly content. I made every attempt to manually correct the errors within my dictation. However, errors related to voice recognition software and/or scribes may still exist and should be interpreted within the appropriate context.

## 2022-03-23 NOTE — DISCHARGE INSTRUCTIONS
This man has been bad indigestion and reflux he can take Pepcid twice a day for the next 1 to 2 weeks to help with any symptoms as well as Tums and Maalox as needed.  However if you have any recurrence of symptoms chest pain or dyspnea while you are ambulating new or worsening leg swelling or new or worsening shortness of breath please return to the emergency department for recheck.  Please talk to your cardiologist in the morning

## 2022-03-23 NOTE — ED NOTES
Repeat troponin sent to lab. Patient denies chest pain at this time. Patient sitting comfortably in bed at this time.

## 2022-03-23 NOTE — ED NOTES
Pt discharged ambulatory with family. IV discontinued and gauze placed, pt in possession of belongings. Pt provided discharge education and information pertaining to medications and follow up appointments. Pt received copy of discharge instructions and verbalized understanding. /80   Pulse (!) 59   Temp 36.6 °C (97.8 °F) (Temporal)   Resp 18   Wt 63 kg (138 lb 14.2 oz)   SpO2 97%   BMI 28.05 kg/m²

## 2022-03-28 ENCOUNTER — OFFICE VISIT (OUTPATIENT)
Dept: CARDIOLOGY | Facility: MEDICAL CENTER | Age: 67
End: 2022-03-28
Payer: MEDICARE

## 2022-03-28 VITALS
SYSTOLIC BLOOD PRESSURE: 138 MMHG | HEIGHT: 59 IN | WEIGHT: 138 LBS | OXYGEN SATURATION: 94 % | HEART RATE: 71 BPM | BODY MASS INDEX: 27.82 KG/M2 | RESPIRATION RATE: 18 BRPM | DIASTOLIC BLOOD PRESSURE: 90 MMHG

## 2022-03-28 DIAGNOSIS — I10 ESSENTIAL HYPERTENSION, BENIGN: ICD-10-CM

## 2022-03-28 DIAGNOSIS — I25.10 CORONARY ARTERY DISEASE INVOLVING NATIVE CORONARY ARTERY OF NATIVE HEART WITHOUT ANGINA PECTORIS: ICD-10-CM

## 2022-03-28 DIAGNOSIS — E78.2 MIXED HYPERLIPIDEMIA: ICD-10-CM

## 2022-03-28 DIAGNOSIS — Z95.1 S/P CABG X 3: ICD-10-CM

## 2022-03-28 PROCEDURE — 99214 OFFICE O/P EST MOD 30 MIN: CPT | Performed by: INTERNAL MEDICINE

## 2022-03-28 ASSESSMENT — ENCOUNTER SYMPTOMS
EYES NEGATIVE: 1
CHILLS: 0
MYALGIAS: 0
DOUBLE VISION: 0
SHORTNESS OF BREATH: 0
VOMITING: 0
NEUROLOGICAL NEGATIVE: 1
PALPITATIONS: 0
WEAKNESS: 0
NAUSEA: 0
COUGH: 0
CARDIOVASCULAR NEGATIVE: 1
HEADACHES: 0
GASTROINTESTINAL NEGATIVE: 1
NERVOUS/ANXIOUS: 0
DEPRESSION: 0
ABDOMINAL PAIN: 0
PSYCHIATRIC NEGATIVE: 1
WEIGHT LOSS: 0
CONSTITUTIONAL NEGATIVE: 1
FOCAL WEAKNESS: 0
BLURRED VISION: 0
CLAUDICATION: 0
FEVER: 0
DIZZINESS: 0
MUSCULOSKELETAL NEGATIVE: 1
BRUISES/BLEEDS EASILY: 0
RESPIRATORY NEGATIVE: 1

## 2022-03-28 ASSESSMENT — FIBROSIS 4 INDEX: FIB4 SCORE: 6.13

## 2022-03-28 NOTE — PROGRESS NOTES
Chief Complaint   Patient presents with   • Coronary Artery Disease     F/V Dx: Coronary artery disease involving native coronary artery without angina pectoris   • Hypertension     F/V Dx: Essential hypertension, benign   • Hyperlipidemia     F/V Dx: Hyperlipidemia, mixed         Subjective     Malena Borden is a 66 y.o. female who presents today for hospital follow up.    Since the discharge from [facility] Emergency Room on 22 for chest pain which was determined to be related to reflux disease, she has been doing well. She denies chest pain, shortness of breath, palpitations, nausea/vomiting or diaphoresis.    Past Medical History:   Diagnosis Date   • CAD (coronary artery disease) 2014    Multivessel CAD   • Heart attack (HCC) 2014    NSTEMI   • High cholesterol    • Hyperlipidemia    • Hypertension    • Indigestion    • Thyroid disease      Past Surgical History:   Procedure Laterality Date   • MULTIPLE CORONARY ARTERY BYPASS ENDO VEIN HARVEST  10/30/2014    Performed by Mando Vieira M.D. at SURGERY [facility] ORS   • RECOVERY  10/20/2014    Performed by Cath-Recovery Surgery at SURGERY SAME DAY [facility] ORS   • [facility] CARDIAC CATH  2014   • OTHER      polyp removal (cervical?)   • PA  DELIVERY ONLY     • TUBAL COAGULATION LAPAROSCOPIC BILATERAL      27yo   • TUBAL LIGATION       Family History   Problem Relation Age of Onset   • Lung Disease Mother         COPD   • Lung Disease Brother         COPD   • No Known Problems Son    • Colon Cancer Neg Hx    • Breast Cancer Neg Hx      Social History     Socioeconomic History   • Marital status: Single     Spouse name: Not on file   • Number of children: 1   • Years of education: Not on file   • Highest education level: Not on file   Occupational History   • Not on file   Tobacco Use   • Smoking status: Never Smoker   • Smokeless tobacco: Never Used   • Tobacco comment: passive exposure (mother, worked in a  [employer])   Vaping Use   • Vaping Use: Never used   Substance and Sexual Activity   • Alcohol use: Not Currently   • Drug use: No   • Sexual activity: Not on file   Other Topics Concern   • Not on file   Social History Narrative    Lives with boyfriend. Has 3 dogs, getting a forth next week.          Social Determinants of Health     Financial Resource Strain: Not on file   Food Insecurity: Not on file   Transportation Needs: Not on file   Physical Activity: Not on file   Stress: Not on file   Social Connections: Not on file   Intimate Partner Violence: Not on file   Housing Stability: Not on file     No Known Allergies     (Medications reviewed.)  Outpatient Encounter Medications as of 3/28/2022   Medication Sig Dispense Refill   • POTASSIUM PO Take 1 Tablet by mouth. 3 x a week.     • atorvastatin (LIPITOR) 40 MG Tab Take 1 Tablet by mouth every day. 100 Tablet 3   • acyclovir (ZOVIRAX) 400 MG tablet Take 1 Tablet by mouth 2 times a day. For suppression of herpes 180 Tablet 0   • levothyroxine (SYNTHROID) 88 MCG Tab Take 1 Tablet by mouth every morning on an empty stomach. 90 Tablet 3   • nitroglycerin (NITROSTAT) 0.4 MG SL Tab Place 1 Tablet under the tongue as needed for Chest Pain. 25 Tablet 3   • metoprolol tartrate (LOPRESSOR) 25 MG Tab Take 0.5 Tablets by mouth 2 times a day. 90 Tablet 3   • Cyanocobalamin (VITAMIN B-12 PO) Take  by mouth.     • Chlorpheniramine Maleate (ALLERGY 4 HOUR PO) Take  by mouth as needed.     • vitamin D (CHOLECALCIFEROL) 1000 UNIT Tab Take 1,000 Units by mouth every day.     • aspirin 81 MG tablet Take 81 mg by mouth every day.       No facility-administered encounter medications on file as of 3/28/2022.     Review of Systems   Constitutional: Negative.  Negative for chills, fever, malaise/fatigue and weight loss.   HENT: Negative.  Negative for hearing loss.    Eyes: Negative.  Negative for blurred vision and double vision.   Respiratory: Negative.  Negative for cough and shortness  "of breath.    Cardiovascular: Negative.  Negative for chest pain, palpitations, claudication and leg swelling.   Gastrointestinal: Negative.  Negative for abdominal pain, nausea and vomiting.   Genitourinary: Negative.  Negative for dysuria and urgency.   Musculoskeletal: Negative.  Negative for joint pain and myalgias.   Skin: Negative.  Negative for itching and rash.   Neurological: Negative.  Negative for dizziness, focal weakness, weakness and headaches.   Endo/Heme/Allergies: Negative.  Does not bruise/bleed easily.   Psychiatric/Behavioral: Negative.  Negative for depression. The patient is not nervous/anxious.               Objective     /90 (BP Location: Left arm, Patient Position: Sitting, BP Cuff Size: Adult)   Pulse 71   Resp 18   Ht 1.499 m (4' 11\")   Wt 62.6 kg (138 lb)   SpO2 94%   BMI 27.87 kg/m²     Physical Exam  Constitutional:       Appearance: She is well-developed.   HENT:      Head: Normocephalic and atraumatic.   Neck:      Vascular: No JVD.   Cardiovascular:      Rate and Rhythm: Normal rate and regular rhythm.      Heart sounds: Normal heart sounds.   Pulmonary:      Effort: Pulmonary effort is normal.      Breath sounds: Normal breath sounds.   Abdominal:      General: Bowel sounds are normal.      Palpations: Abdomen is soft.      Comments: No hepatosplenomegaly.   Musculoskeletal:         General: Normal range of motion.   Lymphadenopathy:      Cervical: No cervical adenopathy.   Skin:     General: Skin is warm and dry.   Neurological:      Mental Status: She is alert and oriented to person, place, and time.            CARDIAC STUDIES/PROCEDURES:     ANKLE BRACHIAL INDEX (07/20/17)  No evidence of arterial insufficiency.      CARDIAC CATHETERIZATION CONCLUSIONS (06/06/19)  1.  Native 3-vessel coronary artery disease with patent left internal mammary   artery to the left anterior descending artery, patent saphenous vein graft to   an obtuse marginal branch, patent saphenous " vein graft to the distal right   coronary artery.  2.  Mildly reduced left ventricular systolic function with ejection fraction of 40%.  3.  Mildly elevated left ventricular end-diastolic pressure.     CAROTID ULTRASOUND (07/20/17)  Mild bilateral internal carotid artery stenosis (<50%).     ECHOCARDIOGRAM CONCLUSIONS (06/10/20)  Prior study done 07/20/2017, compared to the report of the study done -   there has been no significant change.   Normal left ventricular systolic function.  Left ventricular ejection fraction is visually estimated to be 65%.  No significant valve abnormalities.      ECHOCARDIOGRAM CONCLUSIONS (07/20/17)  Normal left ventricular size and systolic function.   Normal diastolic function.   Mild concentric left ventricular hypertrophy.   Normal regional wall motion.   Left ventricular ejection fraction is visually estimated to be 60%.  Compared to the report of the study done 6/2014- there has been no   significant change     EKG performed on (03/22/22) was reviewed: EKG personally interpreted shows .  EKG performed on (06/05/19) EKG shows sinus bradycardia.     Laboratory results of (01/06/22) were reviewed. Cholesterol profile of 156/123/51/80 mg/dL noted.  Laboratory results of (01/06/21) Cholesterol profile of 151/109/49/80 mg/dL noted.  Laboratory results of (01/15/20) Cholesterol profile of 152/107/54/77 noted.   Laboratory results of (11/14/19) Cholesterol profile of 177/146/52/96 noted.     PET SCAN (05/22/19)  1.  No ischemic ST-T wave changes are noted.  2.  Large area of complete reversibility in the entire anterolateral wall,   concerning for ischemia.  3.  Normal left ventricular systolic function as discussed above.  4.  Transient ischemic dilatation is noted, concerning for 3-vessel disease.    Dr. Ana Muller was updated about the above findings at the time of the read.    Assessment & Plan     1. Coronary artery disease involving native coronary artery of native heart  without angina pectoris     2. S/P CABG x 3     3. Essential hypertension, benign     4. Hyperlipidemia, mixed         Medical Decision Making: Today's Assessment/Status/Plan:        1. Coronary artery disease with prior coronary bypass graft (x4 with left internal mammary to left anterior descending, reverse saphenous vein graft separately to the first obtuse marginal branch and the distal obtuse marginal branch and the right coronary artery just proximal to the varun by Mando Elliott 10/30/14): She is clinically doing well. I will continue with current medical care including aspirin, metoprolol and atorvastatin. We did discuss the options of performing myocardial perfusion imaging study, however, she does not wish to schedule this procedure at this time.  2. Hypertension: Blood pressure is well controlled. We will continue with beta blockade therapy.  3. Hyperlipidemia: She is doing well on statin therapy without myalgia symptoms. Her LFT was elevated.We will repeat CMP,  4. Medication intolerance: She is intolerant to lisinopril due to cough.      We will follow up in 6 months.     CC Fatou Mcallister

## 2022-03-31 ENCOUNTER — HOSPITAL ENCOUNTER (OUTPATIENT)
Dept: LAB | Facility: MEDICAL CENTER | Age: 67
End: 2022-03-31
Attending: STUDENT IN AN ORGANIZED HEALTH CARE EDUCATION/TRAINING PROGRAM
Payer: MEDICARE

## 2022-03-31 ENCOUNTER — HOSPITAL ENCOUNTER (OUTPATIENT)
Dept: LAB | Facility: MEDICAL CENTER | Age: 67
End: 2022-03-31
Attending: INTERNAL MEDICINE
Payer: MEDICARE

## 2022-03-31 DIAGNOSIS — E78.2 MIXED HYPERLIPIDEMIA: ICD-10-CM

## 2022-03-31 DIAGNOSIS — R74.8 ELEVATED ALKALINE PHOSPHATASE LEVEL: ICD-10-CM

## 2022-03-31 DIAGNOSIS — N18.31 STAGE 3A CHRONIC KIDNEY DISEASE: ICD-10-CM

## 2022-03-31 LAB
ALBUMIN SERPL BCP-MCNC: 4.4 G/DL (ref 3.2–4.9)
ALBUMIN/GLOB SERPL: 2 G/DL
ALP SERPL-CCNC: 109 U/L (ref 30–99)
ALT SERPL-CCNC: 23 U/L (ref 2–50)
ANION GAP SERPL CALC-SCNC: 15 MMOL/L (ref 7–16)
ANION GAP SERPL CALC-SCNC: 15 MMOL/L (ref 7–16)
AST SERPL-CCNC: 22 U/L (ref 12–45)
BILIRUB SERPL-MCNC: 0.9 MG/DL (ref 0.1–1.5)
BUN SERPL-MCNC: 20 MG/DL (ref 8–22)
BUN SERPL-MCNC: 20 MG/DL (ref 8–22)
CALCIUM SERPL-MCNC: 9.6 MG/DL (ref 8.5–10.5)
CALCIUM SERPL-MCNC: 9.7 MG/DL (ref 8.5–10.5)
CHLORIDE SERPL-SCNC: 102 MMOL/L (ref 96–112)
CHLORIDE SERPL-SCNC: 102 MMOL/L (ref 96–112)
CO2 SERPL-SCNC: 22 MMOL/L (ref 20–33)
CO2 SERPL-SCNC: 23 MMOL/L (ref 20–33)
CREAT SERPL-MCNC: 1.1 MG/DL (ref 0.5–1.4)
CREAT SERPL-MCNC: 1.12 MG/DL (ref 0.5–1.4)
GFR SERPLBLD CREATININE-BSD FMLA CKD-EPI: 54 ML/MIN/1.73 M 2
GFR SERPLBLD CREATININE-BSD FMLA CKD-EPI: 55 ML/MIN/1.73 M 2
GLOBULIN SER CALC-MCNC: 2.2 G/DL (ref 1.9–3.5)
GLUCOSE SERPL-MCNC: 74 MG/DL (ref 65–99)
GLUCOSE SERPL-MCNC: 76 MG/DL (ref 65–99)
POTASSIUM SERPL-SCNC: 4 MMOL/L (ref 3.6–5.5)
POTASSIUM SERPL-SCNC: 4.1 MMOL/L (ref 3.6–5.5)
PROT SERPL-MCNC: 6.6 G/DL (ref 6–8.2)
SODIUM SERPL-SCNC: 139 MMOL/L (ref 135–145)
SODIUM SERPL-SCNC: 140 MMOL/L (ref 135–145)

## 2022-03-31 PROCEDURE — 84080 ASSAY ALKALINE PHOSPHATASES: CPT

## 2022-03-31 PROCEDURE — 36415 COLL VENOUS BLD VENIPUNCTURE: CPT

## 2022-03-31 PROCEDURE — 84075 ASSAY ALKALINE PHOSPHATASE: CPT

## 2022-03-31 PROCEDURE — 80048 BASIC METABOLIC PNL TOTAL CA: CPT

## 2022-03-31 PROCEDURE — 80053 COMPREHEN METABOLIC PANEL: CPT

## 2022-04-05 LAB
ALP BONE SERPL-CCNC: 49 U/L (ref 0–55)
ALP ISOS SERPL HS-CCNC: 0 U/L
ALP LIVER SERPL-CCNC: 68 U/L (ref 0–94)
ALP SERPL-CCNC: 117 U/L (ref 40–120)

## 2022-06-14 ENCOUNTER — TELEPHONE (OUTPATIENT)
Dept: HEALTH INFORMATION MANAGEMENT | Facility: OTHER | Age: 67
End: 2022-06-14
Payer: MEDICARE

## 2022-06-16 PROBLEM — I25.118 CORONARY ARTERY DISEASE OF NATIVE ARTERY WITH STABLE ANGINA PECTORIS (HCC): Status: ACTIVE | Noted: 2022-06-16

## 2022-06-16 PROBLEM — R73.03 PREDIABETES: Status: ACTIVE | Noted: 2022-06-16

## 2022-07-15 ENCOUNTER — APPOINTMENT (OUTPATIENT)
Dept: RADIOLOGY | Facility: MEDICAL CENTER | Age: 67
End: 2022-07-15
Attending: STUDENT IN AN ORGANIZED HEALTH CARE EDUCATION/TRAINING PROGRAM
Payer: MEDICARE

## 2022-07-17 ENCOUNTER — TELEPHONE (OUTPATIENT)
Dept: MEDICAL GROUP | Facility: PHYSICIAN GROUP | Age: 67
End: 2022-07-17
Payer: MEDICARE

## 2022-07-17 NOTE — TELEPHONE ENCOUNTER
ESTABLISHED PATIENT PRE-VISIT PLANNING     Patient was NOT contacted to complete PVP.     Note: Patient will not be contacted if there is no indication to call.     1.  Reviewed notes from the last few office visits within the medical group: Yes    2.  If any orders were placed at last visit or intended to be done for this visit (i.e. 6 mos follow-up), do we have Results/Consult Notes?         •  Labs - Labs ordered, completed on 3/31/22 and results are in chart.  Note: If patient appointment is for lab review and patient did not complete labs, check with provider if OK to reschedule patient until labs completed.       •  Imaging - MAMMO IS GLORIA        •  Referrals - No referrals were ordered at last office visit.    3. Is this appointment scheduled as a Hospital Follow-Up? No    4.  Immunizations were updated in Epic using Reconcile Outside Information activity? Yes    5.  Patient is due for the following Health Maintenance Topics:   Health Maintenance Due   Topic Date Due   • IMM ZOSTER VACCINES (2 of 3) 02/24/2015   • BONE DENSITY  07/29/2019   • MAMMOGRAM  07/13/2022       6.  AHA (Pulse8) form printed for Provider? Yes

## 2022-08-03 ENCOUNTER — HOSPITAL ENCOUNTER (OUTPATIENT)
Dept: RADIOLOGY | Facility: MEDICAL CENTER | Age: 67
End: 2022-08-03
Attending: STUDENT IN AN ORGANIZED HEALTH CARE EDUCATION/TRAINING PROGRAM
Payer: MEDICARE

## 2022-08-03 DIAGNOSIS — Z12.31 VISIT FOR SCREENING MAMMOGRAM: ICD-10-CM

## 2022-08-03 DIAGNOSIS — M81.0 AGE-RELATED OSTEOPOROSIS WITHOUT CURRENT PATHOLOGICAL FRACTURE: ICD-10-CM

## 2022-08-03 PROCEDURE — 77063 BREAST TOMOSYNTHESIS BI: CPT

## 2022-08-03 PROCEDURE — 77080 DXA BONE DENSITY AXIAL: CPT

## 2022-08-31 ENCOUNTER — OFFICE VISIT (OUTPATIENT)
Dept: MEDICAL GROUP | Facility: PHYSICIAN GROUP | Age: 67
End: 2022-08-31
Payer: MEDICARE

## 2022-08-31 VITALS
DIASTOLIC BLOOD PRESSURE: 80 MMHG | OXYGEN SATURATION: 95 % | HEART RATE: 66 BPM | WEIGHT: 133 LBS | SYSTOLIC BLOOD PRESSURE: 150 MMHG | HEIGHT: 59 IN | TEMPERATURE: 97.8 F | BODY MASS INDEX: 26.81 KG/M2

## 2022-08-31 DIAGNOSIS — M81.0 AGE-RELATED OSTEOPOROSIS WITHOUT CURRENT PATHOLOGICAL FRACTURE: ICD-10-CM

## 2022-08-31 DIAGNOSIS — N18.31 STAGE 3A CHRONIC KIDNEY DISEASE: ICD-10-CM

## 2022-08-31 DIAGNOSIS — E78.2 MIXED HYPERLIPIDEMIA: ICD-10-CM

## 2022-08-31 DIAGNOSIS — E03.9 HYPOTHYROIDISM, UNSPECIFIED TYPE: ICD-10-CM

## 2022-08-31 DIAGNOSIS — R73.03 PREDIABETES: ICD-10-CM

## 2022-08-31 DIAGNOSIS — I10 PRIMARY HYPERTENSION: ICD-10-CM

## 2022-08-31 PROCEDURE — 99214 OFFICE O/P EST MOD 30 MIN: CPT | Performed by: STUDENT IN AN ORGANIZED HEALTH CARE EDUCATION/TRAINING PROGRAM

## 2022-08-31 ASSESSMENT — FIBROSIS 4 INDEX: FIB4 SCORE: 2.15

## 2022-08-31 NOTE — PATIENT INSTRUCTIONS
Ideal blood pressure <130/80 and at least <140/90.    2000IU D3 daily.  Calcium 1200mg day (diet better, but supplement if not enough in diet)    Fasting labs prior to next appointment in January

## 2022-08-31 NOTE — PROGRESS NOTES
"Subjective:     Chief Complaint   Patient presents with    Follow-Up     HPI:   Malena presents today for follow up.    Has not concerns today, but reports recently losing 2 dogs and most recent on Monday which may explain the elevated BP today. Not checking at home, compliant with medications.     No problem-specific Assessment & Plan notes found for this encounter.    Current Outpatient Medications Ordered in Epic   Medication Sig Dispense Refill    POTASSIUM PO Take 1 Tablet by mouth. 3 x a week.      atorvastatin (LIPITOR) 40 MG Tab Take 1 Tablet by mouth every day. 100 Tablet 3    acyclovir (ZOVIRAX) 400 MG tablet Take 1 Tablet by mouth 2 times a day. For suppression of herpes 180 Tablet 0    levothyroxine (SYNTHROID) 88 MCG Tab Take 1 Tablet by mouth every morning on an empty stomach. 90 Tablet 3    nitroglycerin (NITROSTAT) 0.4 MG SL Tab Place 1 Tablet under the tongue as needed for Chest Pain. 25 Tablet 3    metoprolol tartrate (LOPRESSOR) 25 MG Tab Take 0.5 Tablets by mouth 2 times a day. 90 Tablet 3    Cyanocobalamin (VITAMIN B-12 PO) Take  by mouth.      Chlorpheniramine Maleate (ALLERGY 4 HOUR PO) Take  by mouth as needed.      vitamin D (CHOLECALCIFEROL) 1000 UNIT Tab Take 1,000 Units by mouth every day.      aspirin 81 MG tablet Take 81 mg by mouth every day.       No current [facility]-ordered facility-administered medications on file.     Health Maintenance: Declines all vaccines.    ROS:  Gen: no fevers/chills, no changes in weight  Eyes: no changes in vision  ENT: no sore throat  Pulm: no sob, no cough  CV: no chest pain, no palpitations  GI: no nausea/vomiting, no diarrhea  : no dysuria  MSk: no myalgias  Skin: no rash  Neuro: no headaches, no numbness/tingling  Heme/Lymph: no easy bruising    Objective:     Exam:  BP (!) 150/80 (BP Location: Left arm, Patient Position: Sitting, BP Cuff Size: Adult)   Pulse 66   Temp 36.6 °C (97.8 °F) (Temporal)   Ht 1.499 m (4' 11\")   Wt 60.3 kg (133 lb)   SpO2 " 95%   BMI 26.86 kg/m²  Body mass index is 26.86 kg/m².    Physical Exam:  Constitutional: Well-developed and well-nourished. No acute distress.   Skin: Skin is warm and dry. No rash noted.  Head: Atraumatic without lesions.  Eyes: Conjunctivae and extraocular motions are normal. Pupils are equal, round. No scleral icterus.   Mouth/Throat: Wearing mask.  Neck: Supple, trachea midline.   Cardiovascular: Regular rate and rhythm, S1 and S2 without murmur, rubs, or gallops.  Lungs: Normal inspiratory effort, CTA bilaterally, no wheezes/rhonchi/rales  Extremities: No cyanosis, clubbing, erythema, nor edema.  Neurological: Alert and oriented x 3. No gross/focal deficits.  Psychiatric:  Behavior, mood, and affect are appropriate.    Labs: Reviewed from 1/6/2022 and 3/22/2022  Imaging: DEXA 8/2022    Assessment & Plan:     67 y.o. female with the following -     1. Age-related osteoporosis without current pathological fracture  Chronic. Discussed risk for fracture and recommendation to start bisphosphonate-declines. Continue vitamin D (ok based on last lab to reduce down to 2000IU D3 daily) and discussed adequate Ca/exercise. Repeat DEXA in 2yrs.  - VITAMIN D,25 HYDROXY (DEFICIENCY); Future    2. Primary hypertension  Chronic, well controlled. No changes to current treatment as BP regimen as well controlled last visit. Pt to check at home, discuss ideal ranges and return precautions. Has an appt with cardiology in 1m so can trend then in clinic.     3. Stage 3a chronic kidney disease (HCC)  Chronic, stable. BP not well controlled today (see above). Ideal <130/80. Avoid nephrotoxins. Trend labs in 5-6 months prior to annual.  - Comp Metabolic Panel; Future  - VITAMIN D,25 HYDROXY (DEFICIENCY); Future  - CBC WITH DIFFERENTIAL; Future  - MICROALBUMIN CREAT RATIO URINE; Future  - PHOSPHORUS; Future  - PTH INTACT (PTH ONLY); Future    4. Hyperlipidemia, mixed  Chronic, no changes to current treatment, ASA/statin for secondary  prevention. Continue care with cardiology.  - Comp Metabolic Panel; Future  - Lipid Profile; Future    5. Hypothyroidism, unspecified type  Chronic, well controlled. No changes to current treatment, annual lab ordered.  - TSH WITH REFLEX TO FT4; Future    6. Prediabetes  Chronic, trend in 1/2023.  - Comp Metabolic Panel; Future  - HEMOGLOBIN A1C; Future    Return in about 5 months (around 1/31/2023), or if symptoms worsen or fail to improve, for Annual Medicare Visit.    Please note that this dictation was created using voice recognition software. I have made every reasonable attempt to correct obvious errors, but I expect that there are errors of grammar and possibly content that I did not discover before finalizing the note.

## 2022-09-26 ENCOUNTER — OFFICE VISIT (OUTPATIENT)
Dept: CARDIOLOGY | Facility: MEDICAL CENTER | Age: 67
End: 2022-09-26
Payer: MEDICARE

## 2022-09-26 VITALS
WEIGHT: 135 LBS | OXYGEN SATURATION: 94 % | SYSTOLIC BLOOD PRESSURE: 120 MMHG | RESPIRATION RATE: 16 BRPM | DIASTOLIC BLOOD PRESSURE: 82 MMHG | HEART RATE: 68 BPM | BODY MASS INDEX: 27.21 KG/M2 | HEIGHT: 59 IN

## 2022-09-26 DIAGNOSIS — Z95.1 S/P CABG X 3: ICD-10-CM

## 2022-09-26 DIAGNOSIS — I25.10 CORONARY ARTERY DISEASE INVOLVING NATIVE CORONARY ARTERY OF NATIVE HEART WITHOUT ANGINA PECTORIS: ICD-10-CM

## 2022-09-26 DIAGNOSIS — E78.2 MIXED HYPERLIPIDEMIA: ICD-10-CM

## 2022-09-26 DIAGNOSIS — I10 PRIMARY HYPERTENSION: ICD-10-CM

## 2022-09-26 PROCEDURE — 99213 OFFICE O/P EST LOW 20 MIN: CPT | Performed by: INTERNAL MEDICINE

## 2022-09-26 ASSESSMENT — ENCOUNTER SYMPTOMS
EYES NEGATIVE: 1
SHORTNESS OF BREATH: 0
CHILLS: 0
WEIGHT LOSS: 0
FEVER: 0
NAUSEA: 0
BRUISES/BLEEDS EASILY: 0
NEUROLOGICAL NEGATIVE: 1
BLURRED VISION: 0
DIZZINESS: 0
FOCAL WEAKNESS: 0
CONSTITUTIONAL NEGATIVE: 1
CLAUDICATION: 0
PALPITATIONS: 0
PSYCHIATRIC NEGATIVE: 1
VOMITING: 0
GASTROINTESTINAL NEGATIVE: 1
RESPIRATORY NEGATIVE: 1
DEPRESSION: 0
HEADACHES: 0
WEAKNESS: 0
NERVOUS/ANXIOUS: 0
ABDOMINAL PAIN: 0
DOUBLE VISION: 0
MYALGIAS: 0
CARDIOVASCULAR NEGATIVE: 1
COUGH: 0
MUSCULOSKELETAL NEGATIVE: 1

## 2022-09-26 ASSESSMENT — FIBROSIS 4 INDEX: FIB4 SCORE: 2.15

## 2022-09-26 NOTE — PROGRESS NOTES
Chief Complaint   Patient presents with    Coronary Artery Disease     F/V Dx: Coronary artery disease involving native coronary artery of native heart without angina pectoris    Hypertension       Subjective     Malena Borden is a 67 y.o. female who presents today for follow up of coronary artery disease with prior coronary artery bypass graft surgery, hypertension and hyperlipidemia.      Since the patient's last visit on 22, she has been doing well clinically. She denies chest pain, shortness of breath, palpitations, nausea/vomiting or diaphoresis. She has been active, however, she has not been walking recently    Past Medical History:   Diagnosis Date    CAD (coronary artery disease) 2014    Multivessel CAD    Heart attack (HCC) 2014    NSTEMI    High cholesterol     Hyperlipidemia     Hypertension     Indigestion     Thyroid disease      Past Surgical History:   Procedure Laterality Date    MULTIPLE CORONARY ARTERY BYPASS ENDO VEIN HARVEST  10/30/2014    Performed by Mando Vieira M.D. at SURGERY [facility]    RECOVERY  10/20/2014    Performed by Cath-Recovery Surgery at SURGERY SAME DAY [facility]    ZZZ CARDIAC CATH  2014    OTHER      polyp removal (cervical?)    [place]IV[place]      TUBAL COAGULATION LAPAROSCOPIC BILATERAL      25yo    TUBAL LIGATION       Family History   Problem Relation Age of Onset    Lung Disease Mother         COPD    Lung Disease Brother         COPD    No Known Problems Son     Colon Cancer Neg Hx     Breast Cancer Neg Hx      Social History     Socioeconomic History    Marital status: Single     Spouse name: Not on file    Number of children: 1    Years of education: Not on file    Highest education level: Not on file   Occupational History    Not on file   Tobacco Use    Smoking status: Never    Smokeless tobacco: Never    Tobacco comments:     passive exposure (mother, worked in a [employer])   Vaping Use    Vaping Use: Never  used   Substance and Sexual Activity    Alcohol use: Not Currently    Drug use: No    Sexual activity: Not on file   Other Topics Concern    Not on file   Social History Narrative    Lives with boyfriend. Has 3 dogs, getting a forth next week.          Social Determinants of Health     Financial Resource Strain: Not on file   Food Insecurity: Not on file   Transportation Needs: Not on file   Physical Activity: Not on file   Stress: Not on file   Social Connections: Not on file   Intimate Partner Violence: Not on file   Housing Stability: Not on file     No Known Allergies  Outpatient Encounter Medications as of 9/26/2022   Medication Sig Dispense Refill    POTASSIUM PO Take 1 Tablet by mouth. 3 x a week.      atorvastatin (LIPITOR) 40 MG Tab Take 1 Tablet by mouth every day. 100 Tablet 3    acyclovir (ZOVIRAX) 400 MG tablet Take 1 Tablet by mouth 2 times a day. For suppression of herpes 180 Tablet 0    levothyroxine (SYNTHROID) 88 MCG Tab Take 1 Tablet by mouth every morning on an empty stomach. 90 Tablet 3    nitroglycerin (NITROSTAT) 0.4 MG SL Tab Place 1 Tablet under the tongue as needed for Chest Pain. 25 Tablet 3    metoprolol tartrate (LOPRESSOR) 25 MG Tab Take 0.5 Tablets by mouth 2 times a day. 90 Tablet 3    Cyanocobalamin (VITAMIN B-12 PO) Take  by mouth.      Chlorpheniramine Maleate (ALLERGY 4 HOUR PO) Take  by mouth as needed.      vitamin D (CHOLECALCIFEROL) 1000 UNIT Tab Take 1,000 Units by mouth every day.      aspirin 81 MG tablet Take 81 mg by mouth every day.       No facility-administered encounter medications on file as of 9/26/2022.     Review of Systems   Constitutional: Negative.  Negative for chills, fever, malaise/fatigue and weight loss.   HENT: Negative.  Negative for hearing loss.    Eyes: Negative.  Negative for blurred vision and double vision.   Respiratory: Negative.  Negative for cough and shortness of breath.    Cardiovascular: Negative.  Negative for chest pain, palpitations,  "claudication and leg swelling.   Gastrointestinal: Negative.  Negative for abdominal pain, nausea and vomiting.   Genitourinary: Negative.  Negative for dysuria and urgency.   Musculoskeletal: Negative.  Negative for joint pain and myalgias.   Skin: Negative.  Negative for itching and rash.   Neurological: Negative.  Negative for dizziness, focal weakness, weakness and headaches.   Endo/Heme/Allergies: Negative.  Does not bruise/bleed easily.   Psychiatric/Behavioral: Negative.  Negative for depression. The patient is not nervous/anxious.             Objective     /82 (BP Location: Right arm, Patient Position: Sitting, BP Cuff Size: Adult)   Pulse 68   Resp 16   Ht 1.499 m (4' 11\")   Wt 61.2 kg (135 lb)   SpO2 94%   BMI 27.27 kg/m²     Physical Exam  Constitutional:       Appearance: Normal appearance. She is well-developed and normal weight.   HENT:      Head: Normocephalic and atraumatic.   Neck:      Vascular: No JVD.   Cardiovascular:      Rate and Rhythm: Normal rate and regular rhythm.      Heart sounds: Normal heart sounds.   Pulmonary:      Effort: Pulmonary effort is normal.      Breath sounds: Normal breath sounds.   Abdominal:      General: Bowel sounds are normal.      Palpations: Abdomen is soft.      Comments: No hepatosplenomegaly.   Musculoskeletal:         General: Normal range of motion.   Lymphadenopathy:      Cervical: No cervical adenopathy.   Skin:     General: Skin is warm and dry.   Neurological:      Mental Status: She is alert and oriented to person, place, and time.          CARDIAC STUDIES/PROCEDURES:     ANKLE BRACHIAL INDEX (07/20/17)  No evidence of arterial insufficiency.      CARDIAC CATHETERIZATION CONCLUSIONS (06/06/19)  1.  Native 3-vessel coronary artery disease with patent left internal mammary   artery to the left anterior descending artery, patent saphenous vein graft to   an obtuse marginal branch, patent saphenous vein graft to the distal right   coronary " artery.  2.  Mildly reduced left ventricular systolic function with ejection fraction of 40%.  3.  Mildly elevated left ventricular end-diastolic pressure.     CAROTID ULTRASOUND (07/20/17)  Mild bilateral internal carotid artery stenosis (<50%).     ECHOCARDIOGRAM CONCLUSIONS (06/10/20)  Prior study done 07/20/2017, compared to the report of the study done -   there has been no significant change.   Normal left ventricular systolic function.  Left ventricular ejection fraction is visually estimated to be 65%.  No significant valve abnormalities.      ECHOCARDIOGRAM CONCLUSIONS (07/20/17)  Normal left ventricular size and systolic function.   Normal diastolic function.   Mild concentric left ventricular hypertrophy.   Normal regional wall motion.   Left ventricular ejection fraction is visually estimated to be 60%.  Compared to the report of the study done 6/2014- there has been no   significant change     EKG performed on (03/22/22) EKG shows sinus rhythm.  EKG performed on (06/05/19) EKG shows sinus bradycardia.     Laboratory results of (01/06/22) were reviewed. Cholesterol profile of 156/123/51/80 mg/dL noted.  Laboratory results of (01/06/21) Cholesterol profile of 151/109/49/80 mg/dL noted.  Laboratory results of (01/15/20) Cholesterol profile of 152/107/54/77 noted.   Laboratory results of (11/14/19) Cholesterol profile of 177/146/52/96 noted.     PET SCAN (05/22/19)  1.  No ischemic ST-T wave changes are noted.  2.  Large area of complete reversibility in the entire anterolateral wall,   concerning for ischemia.  3.  Normal left ventricular systolic function as discussed above.  4.  Transient ischemic dilatation is noted, concerning for 3-vessel disease.    Dr. Ana Muller was updated about the above findings at the time of the read.    Assessment & Plan     1. Coronary artery disease involving native coronary artery of native heart without angina pectoris        2. S/P CABG x 3        3. Primary  hypertension        4. Hyperlipidemia, mixed            Medical Decision Making: Today's Assessment/Status/Plan:        Coronary artery disease with prior coronary bypass graft:(x4 with left internal mammary to left anterior descending, reverse saphenous vein graft separately to the first obtuse marginal branch and the distal obtuse marginal branch and the right coronary artery just proximal to the varun by Mando Elliott 10/30/14):  She is clinically doing well. I will continue with current medical care including aspirin, metoprolol and atorvastatin.  Hypertension: Blood pressure is well controlled. We will continue with beta blockade therapy.  Hyperlipidemia: She is doing well on statin therapy without myalgia symptoms.  Medication intolerance: She is intolerant to lisinopril due to cough.     We will follow up the patient in one year.    CC Fatou Mcallister

## 2022-11-09 ENCOUNTER — OFFICE VISIT (OUTPATIENT)
Dept: MEDICAL GROUP | Facility: PHYSICIAN GROUP | Age: 67
End: 2022-11-09
Payer: MEDICARE

## 2022-11-09 VITALS
RESPIRATION RATE: 20 BRPM | HEART RATE: 73 BPM | BODY MASS INDEX: 27.01 KG/M2 | OXYGEN SATURATION: 96 % | SYSTOLIC BLOOD PRESSURE: 118 MMHG | TEMPERATURE: 97.6 F | HEIGHT: 59 IN | WEIGHT: 134 LBS | DIASTOLIC BLOOD PRESSURE: 60 MMHG

## 2022-11-09 DIAGNOSIS — L72.9 CYST OF SKIN: ICD-10-CM

## 2022-11-09 PROCEDURE — 99213 OFFICE O/P EST LOW 20 MIN: CPT | Performed by: STUDENT IN AN ORGANIZED HEALTH CARE EDUCATION/TRAINING PROGRAM

## 2022-11-09 ASSESSMENT — FIBROSIS 4 INDEX: FIB4 SCORE: 2.15

## 2022-11-09 NOTE — ASSESSMENT & PLAN NOTE
This is a chronic condition but worse since Monday for the past 2 days.  Patient states that at times she will press on it and a malodorous cheesy discharge will come out from the hole.  She did this a few days ago.  Does not drain it often and usually do not really too bothersome and has been stable for a long time but growing recently and somewhat painful.  Has been applying tea tree oil and soaking in the bath which seems to help.  Denies any fever, chills or increased warmth to the area.

## 2022-11-09 NOTE — PROGRESS NOTES
Subjective:     Chief Complaint   Patient presents with    Bump     Worse since Monday, back left     HPI:   Malena presents today with    Cyst of skin  This is a chronic condition but worse since Monday for the past 2 days.  Patient states that at times she will press on it and a malodorous cheesy discharge will come out from the hole.  She did this a few days ago.  Does not drain it often and usually do not really too bothersome and has been stable for a long time but growing recently and somewhat painful.  Has been applying tea tree oil and soaking in the bath which seems to help.  Denies any fever, chills or increased warmth to the area.    Patient states that when she gets nervous she will scratch her lower back although denies any significant pruritus and has a rash that is improving with topical over-the-counter hydrocortisone cream in the mid low back.    Current Outpatient Medications Ordered in Epic   Medication Sig Dispense Refill    POTASSIUM PO Take 1 Tablet by mouth. 3 x a week.      atorvastatin (LIPITOR) 40 MG Tab Take 1 Tablet by mouth every day. 100 Tablet 3    acyclovir (ZOVIRAX) 400 MG tablet Take 1 Tablet by mouth 2 times a day. For suppression of herpes 180 Tablet 0    levothyroxine (SYNTHROID) 88 MCG Tab Take 1 Tablet by mouth every morning on an empty stomach. 90 Tablet 3    nitroglycerin (NITROSTAT) 0.4 MG SL Tab Place 1 Tablet under the tongue as needed for Chest Pain. 25 Tablet 3    metoprolol tartrate (LOPRESSOR) 25 MG Tab Take 0.5 Tablets by mouth 2 times a day. 90 Tablet 3    Cyanocobalamin (VITAMIN B-12 PO) Take  by mouth.      Chlorpheniramine Maleate (ALLERGY 4 HOUR PO) Take  by mouth as needed.      vitamin D (CHOLECALCIFEROL) 1000 UNIT Tab Take 1 Tablet by mouth every day.      aspirin 81 MG tablet Take 1 Tablet by mouth every day.       No current [facility]-ordered facility-administered medications on file.     ROS:  Gen: no fevers/chills, no changes in weight  Eyes: no changes in  "vision  ENT: no sore throat  Pulm: no sob, no cough  CV: no chest pain, no palpitations  GI: no nausea/vomiting, no diarrhea  MSk: no myalgias  Neuro: no headaches, no numbness/tingling  Heme/Lymph: no easy bruising    Objective:     Exam:  /60 (BP Location: Left arm, Patient Position: Sitting, BP Cuff Size: Adult)   Pulse 73   Temp 36.4 °C (97.6 °F) (Temporal)   Resp 20   Ht 1.499 m (4' 11\")   Wt 60.8 kg (134 lb)   SpO2 96%   BMI 27.06 kg/m²  Body mass index is 27.06 kg/m².    Physical Exam:  Constitutional: Well-developed and well-nourished. No acute distress.   Skin: Skin is warm and dry. No rash noted. Very slight blanching erythema without increased warmth note over part of firm minimally tender cyst oval in shape approx [id number] with tiny punctum noted on left lower back without discharge or drainage.  Adjacent to the cyst there is a flat plaque with some scale located midline lower back and not associated with the other lesion.  Head: Atraumatic without lesions.  Eyes: Conjunctivae and extraocular motions are normal. Pupils are equal, round. No scleral icterus.   Mouth/Throat: Wearing mask.  Neck: Supple, trachea midline. Normal range of motion.  Lungs: Normal inspiratory effort  Abdomen: Soft, non tender, and without distention. Active bowel sounds. No rebound, guarding, masses or HSM.  Extremities: No cyanosis, clubbing, erythema, nor edema.  Neurological: Alert and oriented x 3. No gross/focal deficits.  Psychiatric:  Behavior, mood, and affect are appropriate.    Assessment & Plan:     67 y.o. female with the following -     1. Cyst of skin  Acute on chronic, EIC/sebaceous cyst without evidence of infection but with mild inflammation today.  Discussed that we could drain some of the substance of the cyst today but that would not be definitive treatment.  She prefers to go to dermatology for excision, referred.  Patient to avoid attempting to press on the cyst to get it to drain as that may " have contributed to some of the inflammation.  Okay to continue warm baths if that helps with discomfort.  - Referral to Dermatology    I spent a total of 22 minutes with record review, exam, communication with the patient, communication with other providers (Dr. Shrestha-recommends derm referral rather than excision in clinic), and documentation of this encounter.    Return if symptoms worsen or fail to improve.    Please note that this dictation was created using voice recognition software. I have made every reasonable attempt to correct obvious errors, but I expect that there are errors of grammar and possibly content that I did not discover before finalizing the note.

## 2022-11-23 ENCOUNTER — APPOINTMENT (RX ONLY)
Dept: URBAN - METROPOLITAN AREA CLINIC 6 | Facility: CLINIC | Age: 67
Setting detail: DERMATOLOGY
End: 2022-11-23

## 2022-11-23 DIAGNOSIS — L72.0 EPIDERMAL CYST: ICD-10-CM

## 2022-11-23 DIAGNOSIS — L82.0 INFLAMED SEBORRHEIC KERATOSIS: ICD-10-CM

## 2022-11-23 PROCEDURE — 17110 DESTRUCTION B9 LES UP TO 14: CPT

## 2022-11-23 PROCEDURE — 99203 OFFICE O/P NEW LOW 30 MIN: CPT | Mod: 25

## 2022-11-23 PROCEDURE — ? COUNSELING

## 2022-11-23 PROCEDURE — ? DIAGNOSIS COMMENT

## 2022-11-23 PROCEDURE — ? LIQUID NITROGEN

## 2022-11-23 PROCEDURE — ? PRESCRIPTION

## 2022-11-23 RX ORDER — DOXYCYCLINE HYCLATE 100 MG/1
1 CAPSULE, GELATIN COATED ORAL BID
Qty: 20 | Refills: 0 | Status: ERX | COMMUNITY
Start: 2022-11-23

## 2022-11-23 RX ADMIN — DOXYCYCLINE HYCLATE 1: 100 CAPSULE, GELATIN COATED ORAL at 00:00

## 2022-11-23 ASSESSMENT — LOCATION SIMPLE DESCRIPTION DERM
LOCATION SIMPLE: LEFT LOWER BACK
LOCATION SIMPLE: CHEST

## 2022-11-23 ASSESSMENT — LOCATION DETAILED DESCRIPTION DERM
LOCATION DETAILED: LEFT SUPERIOR LATERAL LOWER BACK
LOCATION DETAILED: STERNAL NOTCH

## 2022-11-23 ASSESSMENT — LOCATION ZONE DERM: LOCATION ZONE: TRUNK

## 2022-11-23 NOTE — PROCEDURE: DIAGNOSIS COMMENT
Comment: Patient reports a small cyst has been present for years, but recently became painful and swollen. \\nIt started draining purulent material a few days ago. \\nShe will start taking Doxycycline BID, encouraged to perform warm compresses 2-3 times daily. \\nFollow up once infection has cleared, discussed cyst removal in the future if still present at that time.
Detail Level: Simple
Render Risk Assessment In Note?: no

## 2022-11-23 NOTE — HPI: CYST
How Severe Is Your Cyst?: moderate
Is This A New Presentation, Or A Follow-Up?: Cyst
Additional History: New patient.

## 2022-11-23 NOTE — PROCEDURE: LIQUID NITROGEN
Consent: The patient's consent was obtained including but not limited to risks of crusting, scabbing, blistering, scarring, darker or lighter pigmentary change, recurrence, incomplete removal and infection.
Add 52 Modifier (Optional): no
Spray Paint Text: The liquid nitrogen was applied to the skin utilizing a spray paint frosting technique.
Number Of Freeze-Thaw Cycles: 3 freeze-thaw cycles
Show Aperture Variable?: Yes
Medical Necessity Information: It is in your best interest to select a reason for this procedure from the list below. All of these items fulfill various CMS LCD requirements except the new and changing color options.
Medical Necessity Clause: This procedure was medically necessary because the lesions that were treated were:
Post-Care Instructions: I reviewed with the patient in detail post-care instructions. Patient is to wear sunprotection, and avoid picking at any of the treated lesions. Pt may apply Vaseline to crusted or scabbing areas.
Detail Level: Detailed

## 2022-12-15 ENCOUNTER — DOCUMENTATION (OUTPATIENT)
Dept: HEALTH INFORMATION MANAGEMENT | Facility: OTHER | Age: 67
End: 2022-12-15
Payer: MEDICARE

## 2023-01-31 ENCOUNTER — HOSPITAL ENCOUNTER (OUTPATIENT)
Dept: LAB | Facility: MEDICAL CENTER | Age: 68
End: 2023-01-31
Attending: STUDENT IN AN ORGANIZED HEALTH CARE EDUCATION/TRAINING PROGRAM
Payer: MEDICARE

## 2023-01-31 DIAGNOSIS — N18.31 STAGE 3A CHRONIC KIDNEY DISEASE: ICD-10-CM

## 2023-01-31 DIAGNOSIS — M81.0 AGE-RELATED OSTEOPOROSIS WITHOUT CURRENT PATHOLOGICAL FRACTURE: ICD-10-CM

## 2023-01-31 DIAGNOSIS — R73.03 PREDIABETES: ICD-10-CM

## 2023-01-31 DIAGNOSIS — E03.9 HYPOTHYROIDISM, UNSPECIFIED TYPE: ICD-10-CM

## 2023-01-31 DIAGNOSIS — E78.2 MIXED HYPERLIPIDEMIA: ICD-10-CM

## 2023-01-31 LAB
25(OH)D3 SERPL-MCNC: 74 NG/ML (ref 30–100)
ALBUMIN SERPL BCP-MCNC: 4.3 G/DL (ref 3.2–4.9)
ALBUMIN/GLOB SERPL: 1.5 G/DL
ALP SERPL-CCNC: 105 U/L (ref 30–99)
ALT SERPL-CCNC: 20 U/L (ref 2–50)
ANION GAP SERPL CALC-SCNC: 14 MMOL/L (ref 7–16)
AST SERPL-CCNC: 15 U/L (ref 12–45)
BASOPHILS # BLD AUTO: 1.3 % (ref 0–1.8)
BASOPHILS # BLD: 0.07 K/UL (ref 0–0.12)
BILIRUB SERPL-MCNC: 0.8 MG/DL (ref 0.1–1.5)
BUN SERPL-MCNC: 21 MG/DL (ref 8–22)
CALCIUM ALBUM COR SERPL-MCNC: 9.7 MG/DL (ref 8.5–10.5)
CALCIUM SERPL-MCNC: 9.9 MG/DL (ref 8.5–10.5)
CHLORIDE SERPL-SCNC: 102 MMOL/L (ref 96–112)
CHOLEST SERPL-MCNC: 135 MG/DL (ref 100–199)
CO2 SERPL-SCNC: 24 MMOL/L (ref 20–33)
CREAT SERPL-MCNC: 1.14 MG/DL (ref 0.5–1.4)
CREAT UR-MCNC: 31.35 MG/DL
EOSINOPHIL # BLD AUTO: 0.22 K/UL (ref 0–0.51)
EOSINOPHIL NFR BLD: 4 % (ref 0–6.9)
ERYTHROCYTE [DISTWIDTH] IN BLOOD BY AUTOMATED COUNT: 40.6 FL (ref 35.9–50)
EST. AVERAGE GLUCOSE BLD GHB EST-MCNC: 126 MG/DL
FASTING STATUS PATIENT QL REPORTED: NORMAL
GFR SERPLBLD CREATININE-BSD FMLA CKD-EPI: 52 ML/MIN/1.73 M 2
GLOBULIN SER CALC-MCNC: 2.8 G/DL (ref 1.9–3.5)
GLUCOSE SERPL-MCNC: 95 MG/DL (ref 65–99)
HBA1C MFR BLD: 6 % (ref 4–5.6)
HCT VFR BLD AUTO: 48.5 % (ref 37–47)
HDLC SERPL-MCNC: 47 MG/DL
HGB BLD-MCNC: 15.9 G/DL (ref 12–16)
IMM GRANULOCYTES # BLD AUTO: 0.02 K/UL (ref 0–0.11)
IMM GRANULOCYTES NFR BLD AUTO: 0.4 % (ref 0–0.9)
LDLC SERPL CALC-MCNC: 57 MG/DL
LYMPHOCYTES # BLD AUTO: 1.71 K/UL (ref 1–4.8)
LYMPHOCYTES NFR BLD: 31.1 % (ref 22–41)
MCH RBC QN AUTO: 29.9 PG (ref 27–33)
MCHC RBC AUTO-ENTMCNC: 32.8 G/DL (ref 33.6–35)
MCV RBC AUTO: 91.2 FL (ref 81.4–97.8)
MICROALBUMIN UR-MCNC: <1.2 MG/DL
MICROALBUMIN/CREAT UR: NORMAL MG/G (ref 0–30)
MONOCYTES # BLD AUTO: 0.34 K/UL (ref 0–0.85)
MONOCYTES NFR BLD AUTO: 6.2 % (ref 0–13.4)
NEUTROPHILS # BLD AUTO: 3.14 K/UL (ref 2–7.15)
NEUTROPHILS NFR BLD: 57 % (ref 44–72)
NRBC # BLD AUTO: 0.02 K/UL
NRBC BLD-RTO: 0.4 /100 WBC
PHOSPHATE SERPL-MCNC: 3.9 MG/DL (ref 2.5–4.5)
PLATELET # BLD AUTO: 147 K/UL (ref 164–446)
PMV BLD AUTO: 14 FL (ref 9–12.9)
POTASSIUM SERPL-SCNC: 4.2 MMOL/L (ref 3.6–5.5)
PROT SERPL-MCNC: 7.1 G/DL (ref 6–8.2)
PTH-INTACT SERPL-MCNC: 46.1 PG/ML (ref 14–72)
RBC # BLD AUTO: 5.32 M/UL (ref 4.2–5.4)
SODIUM SERPL-SCNC: 140 MMOL/L (ref 135–145)
TRIGL SERPL-MCNC: 153 MG/DL (ref 0–149)
TSH SERPL DL<=0.005 MIU/L-ACNC: 0.42 UIU/ML (ref 0.38–5.33)
WBC # BLD AUTO: 5.5 K/UL (ref 4.8–10.8)

## 2023-01-31 PROCEDURE — 82306 VITAMIN D 25 HYDROXY: CPT

## 2023-01-31 PROCEDURE — 80061 LIPID PANEL: CPT

## 2023-01-31 PROCEDURE — 83036 HEMOGLOBIN GLYCOSYLATED A1C: CPT

## 2023-01-31 PROCEDURE — 85025 COMPLETE CBC W/AUTO DIFF WBC: CPT

## 2023-01-31 PROCEDURE — 82570 ASSAY OF URINE CREATININE: CPT

## 2023-01-31 PROCEDURE — 83970 ASSAY OF PARATHORMONE: CPT

## 2023-01-31 PROCEDURE — 82043 UR ALBUMIN QUANTITATIVE: CPT

## 2023-01-31 PROCEDURE — 84443 ASSAY THYROID STIM HORMONE: CPT

## 2023-01-31 PROCEDURE — 80053 COMPREHEN METABOLIC PANEL: CPT

## 2023-01-31 PROCEDURE — 84100 ASSAY OF PHOSPHORUS: CPT

## 2023-02-07 SDOH — HEALTH STABILITY: PHYSICAL HEALTH: ON AVERAGE, HOW MANY MINUTES DO YOU ENGAGE IN EXERCISE AT THIS LEVEL?: 0 MIN

## 2023-02-07 SDOH — ECONOMIC STABILITY: FOOD INSECURITY: WITHIN THE PAST 12 MONTHS, THE FOOD YOU BOUGHT JUST DIDN'T LAST AND YOU DIDN'T HAVE MONEY TO GET MORE.: PATIENT DECLINED

## 2023-02-07 SDOH — ECONOMIC STABILITY: TRANSPORTATION INSECURITY
IN THE PAST 12 MONTHS, HAS LACK OF TRANSPORTATION KEPT YOU FROM MEETINGS, WORK, OR FROM GETTING THINGS NEEDED FOR DAILY LIVING?: NO

## 2023-02-07 SDOH — ECONOMIC STABILITY: INCOME INSECURITY: IN THE LAST 12 MONTHS, WAS THERE A TIME WHEN YOU WERE NOT ABLE TO PAY THE MORTGAGE OR RENT ON TIME?: PATIENT REFUSED

## 2023-02-07 SDOH — ECONOMIC STABILITY: HOUSING INSECURITY
IN THE LAST 12 MONTHS, WAS THERE A TIME WHEN YOU DID NOT HAVE A STEADY PLACE TO SLEEP OR SLEPT IN A SHELTER (INCLUDING NOW)?: PATIENT REFUSED

## 2023-02-07 SDOH — ECONOMIC STABILITY: TRANSPORTATION INSECURITY
IN THE PAST 12 MONTHS, HAS THE LACK OF TRANSPORTATION KEPT YOU FROM MEDICAL APPOINTMENTS OR FROM GETTING MEDICATIONS?: NO

## 2023-02-07 SDOH — ECONOMIC STABILITY: INCOME INSECURITY: HOW HARD IS IT FOR YOU TO PAY FOR THE VERY BASICS LIKE FOOD, HOUSING, MEDICAL CARE, AND HEATING?: NOT VERY HARD

## 2023-02-07 SDOH — HEALTH STABILITY: PHYSICAL HEALTH: ON AVERAGE, HOW MANY DAYS PER WEEK DO YOU ENGAGE IN MODERATE TO STRENUOUS EXERCISE (LIKE A BRISK WALK)?: 3 DAYS

## 2023-02-07 SDOH — HEALTH STABILITY: MENTAL HEALTH
STRESS IS WHEN SOMEONE FEELS TENSE, NERVOUS, ANXIOUS, OR CAN'T SLEEP AT NIGHT BECAUSE THEIR MIND IS TROUBLED. HOW STRESSED ARE YOU?: TO SOME EXTENT

## 2023-02-07 SDOH — ECONOMIC STABILITY: HOUSING INSECURITY

## 2023-02-07 SDOH — ECONOMIC STABILITY: TRANSPORTATION INSECURITY
IN THE PAST 12 MONTHS, HAS LACK OF RELIABLE TRANSPORTATION KEPT YOU FROM MEDICAL APPOINTMENTS, MEETINGS, WORK OR FROM GETTING THINGS NEEDED FOR DAILY LIVING?: NO

## 2023-02-07 SDOH — ECONOMIC STABILITY: FOOD INSECURITY: WITHIN THE PAST 12 MONTHS, YOU WORRIED THAT YOUR FOOD WOULD RUN OUT BEFORE YOU GOT MONEY TO BUY MORE.: PATIENT DECLINED

## 2023-02-07 ASSESSMENT — SOCIAL DETERMINANTS OF HEALTH (SDOH)
HOW OFTEN DO YOU HAVE A DRINK CONTAINING ALCOHOL: PATIENT DECLINED
DO YOU BELONG TO ANY CLUBS OR ORGANIZATIONS SUCH AS CHURCH GROUPS UNIONS, FRATERNAL OR ATHLETIC GROUPS, OR SCHOOL GROUPS?: NO
HOW MANY DRINKS CONTAINING ALCOHOL DO YOU HAVE ON A TYPICAL DAY WHEN YOU ARE DRINKING: PATIENT DECLINED
HOW OFTEN DO YOU ATTENT MEETINGS OF THE CLUB OR ORGANIZATION YOU BELONG TO?: NEVER
HOW OFTEN DO YOU ATTEND CHURCH OR RELIGIOUS SERVICES?: NEVER
HOW OFTEN DO YOU GET TOGETHER WITH FRIENDS OR RELATIVES?: ONCE A WEEK
HOW HARD IS IT FOR YOU TO PAY FOR THE VERY BASICS LIKE FOOD, HOUSING, MEDICAL CARE, AND HEATING?: NOT VERY HARD
HOW OFTEN DO YOU ATTENT MEETINGS OF THE CLUB OR ORGANIZATION YOU BELONG TO?: NEVER
ARE YOU MARRIED, WIDOWED, DIVORCED, SEPARATED, NEVER MARRIED, OR LIVING WITH A PARTNER?: LIVING WITH PARTNER
HOW OFTEN DO YOU HAVE SIX OR MORE DRINKS ON ONE OCCASION: PATIENT DECLINED
HOW OFTEN DO YOU ATTEND CHURCH OR RELIGIOUS SERVICES?: NEVER
IN A TYPICAL WEEK, HOW MANY TIMES DO YOU TALK ON THE PHONE WITH FAMILY, FRIENDS, OR NEIGHBORS?: MORE THAN THREE TIMES A WEEK
ARE YOU MARRIED, WIDOWED, DIVORCED, SEPARATED, NEVER MARRIED, OR LIVING WITH A PARTNER?: LIVING WITH PARTNER
IN A TYPICAL WEEK, HOW MANY TIMES DO YOU TALK ON THE PHONE WITH FAMILY, FRIENDS, OR NEIGHBORS?: MORE THAN THREE TIMES A WEEK
WITHIN THE PAST 12 MONTHS, YOU WORRIED THAT YOUR FOOD WOULD RUN OUT BEFORE YOU GOT THE MONEY TO BUY MORE: PATIENT DECLINED
HOW OFTEN DO YOU GET TOGETHER WITH FRIENDS OR RELATIVES?: ONCE A WEEK
DO YOU BELONG TO ANY CLUBS OR ORGANIZATIONS SUCH AS CHURCH GROUPS UNIONS, FRATERNAL OR ATHLETIC GROUPS, OR SCHOOL GROUPS?: NO

## 2023-02-07 ASSESSMENT — LIFESTYLE VARIABLES
SKIP TO QUESTIONS 9-10: 0
HOW OFTEN DO YOU HAVE SIX OR MORE DRINKS ON ONE OCCASION: PATIENT DECLINED
HOW MANY STANDARD DRINKS CONTAINING ALCOHOL DO YOU HAVE ON A TYPICAL DAY: PATIENT DECLINED
AUDIT-C TOTAL SCORE: -1
HOW OFTEN DO YOU HAVE A DRINK CONTAINING ALCOHOL: PATIENT DECLINED

## 2023-02-08 ENCOUNTER — OFFICE VISIT (OUTPATIENT)
Dept: MEDICAL GROUP | Facility: PHYSICIAN GROUP | Age: 68
End: 2023-02-08
Payer: MEDICARE

## 2023-02-08 VITALS
OXYGEN SATURATION: 96 % | BODY MASS INDEX: 27.05 KG/M2 | DIASTOLIC BLOOD PRESSURE: 80 MMHG | HEART RATE: 66 BPM | SYSTOLIC BLOOD PRESSURE: 124 MMHG | HEIGHT: 59 IN | TEMPERATURE: 97.4 F | WEIGHT: 134.2 LBS

## 2023-02-08 DIAGNOSIS — I10 ESSENTIAL HYPERTENSION, BENIGN: ICD-10-CM

## 2023-02-08 DIAGNOSIS — R73.03 PREDIABETES: ICD-10-CM

## 2023-02-08 DIAGNOSIS — I25.10 CORONARY ARTERY DISEASE INVOLVING NATIVE CORONARY ARTERY OF NATIVE HEART WITHOUT ANGINA PECTORIS: ICD-10-CM

## 2023-02-08 DIAGNOSIS — Z00.00 ENCOUNTER FOR ANNUAL GENERAL MEDICAL EXAMINATION WITHOUT ABNORMAL FINDINGS IN ADULT: ICD-10-CM

## 2023-02-08 DIAGNOSIS — Z95.1 S/P CABG X 3: ICD-10-CM

## 2023-02-08 DIAGNOSIS — E03.9 HYPOTHYROIDISM, UNSPECIFIED TYPE: ICD-10-CM

## 2023-02-08 DIAGNOSIS — Z86.19 HISTORY OF HERPES GENITALIS: ICD-10-CM

## 2023-02-08 DIAGNOSIS — N18.31 STAGE 3A CHRONIC KIDNEY DISEASE: ICD-10-CM

## 2023-02-08 DIAGNOSIS — D69.6 THROMBOCYTOPENIA (HCC): ICD-10-CM

## 2023-02-08 DIAGNOSIS — E78.2 MIXED HYPERLIPIDEMIA: ICD-10-CM

## 2023-02-08 PROCEDURE — G0439 PPPS, SUBSEQ VISIT: HCPCS | Performed by: STUDENT IN AN ORGANIZED HEALTH CARE EDUCATION/TRAINING PROGRAM

## 2023-02-08 RX ORDER — LEVOTHYROXINE SODIUM 88 UG/1
88 TABLET ORAL
Qty: 90 TABLET | Refills: 3 | Status: SHIPPED | OUTPATIENT
Start: 2023-02-08 | End: 2024-01-29 | Stop reason: SDUPTHER

## 2023-02-08 RX ORDER — ATORVASTATIN CALCIUM 40 MG/1
40 TABLET, FILM COATED ORAL DAILY
Qty: 100 TABLET | Refills: 3 | Status: SHIPPED | OUTPATIENT
Start: 2023-02-08 | End: 2024-01-29 | Stop reason: SDUPTHER

## 2023-02-08 RX ORDER — ACYCLOVIR 800 MG/1
800 TABLET ORAL 3 TIMES DAILY
Qty: 18 TABLET | Refills: 1 | Status: SHIPPED | OUTPATIENT
Start: 2023-02-08 | End: 2024-01-29 | Stop reason: SDUPTHER

## 2023-02-08 ASSESSMENT — PATIENT HEALTH QUESTIONNAIRE - PHQ9: CLINICAL INTERPRETATION OF PHQ2 SCORE: 0

## 2023-02-08 ASSESSMENT — ENCOUNTER SYMPTOMS: GENERAL WELL-BEING: GOOD

## 2023-02-08 ASSESSMENT — FIBROSIS 4 INDEX: FIB4 SCORE: 1.55

## 2023-02-08 ASSESSMENT — ACTIVITIES OF DAILY LIVING (ADL): BATHING_REQUIRES_ASSISTANCE: 0

## 2023-02-09 NOTE — PROGRESS NOTES
Chief Complaint   Patient presents with    Annual Exam     HPI:  Malena Borden is a 67 y.o. here for Medicare Annual Wellness Visit     Patient Active Problem List    Diagnosis Date Noted    Thrombocytopenia (HCC) 02/08/2023    Prediabetes 06/16/2022    Coronary artery disease of native artery with stable angina pectoris (HCC) 06/16/2022    Elevated alkaline phosphatase level 02/01/2022    BMI 27.0-27.9,adult 02/01/2022    Cyst of skin 02/01/2022    History of herpes genitalis 01/06/2022    Hypothyroidism 01/06/2022    Stage 3a chronic kidney disease (HCC) 01/06/2022    Age-related osteoporosis without current pathological fracture 01/06/2022    S/P CABG x 3 11/21/2014    Hyperlipidemia, mixed 09/19/2014    Coronary artery disease involving native coronary artery without angina pectoris 06/26/2014    Hypertension 06/13/2014    Angina pectoris (HCC) 06/11/2014     Current Outpatient Medications   Medication Sig Dispense Refill    atorvastatin (LIPITOR) 40 MG Tab Take 1 Tablet by mouth every day. 100 Tablet 3    levothyroxine (SYNTHROID) 88 MCG Tab Take 1 Tablet by mouth every morning on an empty stomach. 90 Tablet 3    metoprolol tartrate (LOPRESSOR) 25 MG Tab Take 0.5 Tablets by mouth 2 times a day. 100 Tablet 3    acyclovir (ZOVIRAX) 800 MG Tab Take 1 Tablet by mouth 3 times a day. For 2 days as needed for break outs 18 Tablet 1    nitroglycerin (NITROSTAT) 0.4 MG SL Tab Place 1 Tablet under the tongue as needed for Chest Pain. 25 Tablet 3    Cyanocobalamin (VITAMIN B-12 PO) Take  by mouth every day.      Chlorpheniramine Maleate (ALLERGY 4 HOUR PO) Take  by mouth as needed.      vitamin D (CHOLECALCIFEROL) 1000 UNIT Tab Take 1 Tablet by mouth every day.      aspirin 81 MG tablet Take 1 Tablet by mouth every day.       No current facility-administered medications for this visit.          Current supplements as per medication list.     Allergies: Patient has no known allergies.    Current social  contact/activities: Has 2 dogs (one is 15yo with medical problems). Walks dogs when the weather is good. Spends time with son, partner, granddaughter (27yo), prior coworkers. Goes to the [employer] some times.    She  reports that she has never smoked. She has never used smokeless tobacco. She reports that she does not currently use alcohol. She reports that she does not use drugs.  Counseling given: Not Answered  Tobacco comments: passive exposure (mother, worked in a [email or web address])    ROS:    Gait: Uses no assistive device  Ostomy: No  Other tubes: No  Amputations: No  Chronic oxygen use: No  Last eye exam: 06/01/2022  Wears hearing aids: No   : Denies any urinary leakage during the last 6 months    Screening:    Depression Screening  Little interest or pleasure in doing things?  0 - not at all  Feeling down, depressed , or hopeless? 0 - not at all  Patient Health Questionnaire Score: 0     If depressive symptoms identified deferred to follow up visit unless specifically addressed in assessment and plan.    Interpretation of PHQ-9 Total Score   Score Severity   1-4 No Depression   5-9 Mild Depression   10-14 Moderate Depression   15-19 Moderately Severe Depression   20-27 Severe Depression    Screening for Cognitive Impairment  Three Minute Recall (daughter, heaven, mountain) 3/3    Suresh clock face with all 12 numbers and set the hands to show 10 past 11.  Yes    Cognitive concerns identified deferred for follow up unless specifically addressed in assessment and plan.    Fall Risk Assessment  Has the patient had two or more falls in the last year or any fall with injury in the last year?  No    Safety Assessment  Throw rugs on floor.  Yes  Handrails on all stairs.  Yes  Good lighting in all hallways.  Yes  Difficulty hearing.  No  Patient counseled about all safety risks that were identified.    Functional Assessment ADLs  Are there any barriers preventing you from cooking for yourself or meeting nutritional  needs?  No.    Are there any barriers preventing you from driving safely or obtaining transportation?  No.    Are there any barriers preventing you from using a telephone or calling for help?  No.    Are there any barriers preventing you from shopping?  No.    Are there any barriers preventing you from taking care of your own finances?  No.    Are there any barriers preventing you from managing your medications?  No.    Are there any barriers preventing you from showering, bathing or dressing yourself?  No.    Are you currently engaging in any exercise or physical activity?  Yes.     What is your perception of your health?  Good    Advance Care Planning  Do you have an Advance Directive, Living Will, Durable Power of , or POLST? No-provided with booklet and discussed               Health Maintenance Summary            Postponed - IMM ZOSTER VACCINES (2 of 3) Postponed until 7/8/2023 12/30/2014  Imm Admin: Zoster Vaccine Live (ZVL) (Zostavax) - HISTORICAL DATA              Postponed - IMM INFLUENZA (1) Postponed until 11/9/2023      No completion history exists for this topic.              Postponed - COVID-19 Vaccine (1) Postponed until 2/8/2024      No completion history exists for this topic.              Annual Wellness Visit (Every 366 Days) Next due on 6/17/2023 06/16/2022  Level of Service: ANNUAL WELLNESS VISIT-INCLUDES PPPS SUBSEQUE*              COLORECTAL CANCER SCREENING (COLOGUARD STOOL DNA - Every 3 Years) Next due on 1/21/2024 01/21/2021  COLOGUARD STOOL DNA (Done)              MAMMOGRAM (Every 2 Years) Next due on 8/3/2024      08/03/2022  MA-SCREENING MAMMO BILAT W/TOMOSYNTHESIS W/CAD    07/13/2021  MA-SCREENING MAMMO BILAT W/TOMOSYNTHESIS W/CAD    06/27/2020  MA-SCREENING MAMMO BILAT W/TOMOSYNTHESIS W/CAD    05/21/2019  MA-SCREENING MAMMO BILAT W/CAD    12/07/2016  MA-MAMMO SCREENING BILAT W/MARIA DOLORES W/CAD    Only the first 5 history entries have been loaded, but more history  exists.              BONE DENSITY (Every 2 Years) Next due on 8/3/2024      08/03/2022  DS-BONE DENSITY STUDY (DEXA)    09/21/2012  DS-BONE DENSITY STUDY (DEXA)              IMM DTaP/Tdap/Td Vaccine (2 - Td or Tdap) Next due on 4/8/2025 04/08/2015  Imm Admin: Tdap Vaccine              IMM PNEUMOCOCCAL VACCINE: 65+ Years (Series Information) Completed      01/06/2020  Imm Admin: Pneumococcal polysaccharide vaccine (PPSV-23)    04/08/2015  Imm Admin: Pneumococcal Conjugate Vaccine (Prevnar/PCV-13)              HEPATITIS C SCREENING  Completed      01/06/2021  HEP C VIRUS ANTIBODY              IMM MENINGOCOCCAL ACWY VACCINE (Series Information) Aged Out      No completion history exists for this topic.              Discontinued - IMM HEP B VACCINE  Discontinued      No completion history exists for this topic.                  Patient Care Team:  Fatou Sherman D.O. as PCP - General (Family Medicine)  Judy Scott M.D. as PCP - [facility] Paneled  Manuel Rene M.D. as Consulting Physician (Interventional Cardiology)    Social History     Tobacco Use    Smoking status: Never    Smokeless tobacco: Never    Tobacco comments:     passive exposure (mother, worked in a [employer])   Vaping Use    Vaping Use: Never used   Substance Use Topics    Alcohol use: Not Currently    Drug use: No     Family History   Problem Relation Age of Onset    Lung Disease Mother         COPD    Lung Disease Brother         COPD    No Known Problems Son     Colon Cancer Neg Hx     Breast Cancer Neg Hx      She  has a past medical history of CAD (coronary artery disease) (June 11 2014), Heart attack (HCC) (June 11, 2014), High cholesterol, Hyperlipidemia, Hypertension, Indigestion, and Thyroid disease.   Past Surgical History:   Procedure Laterality Date    MULTIPLE CORONARY ARTERY BYPASS ENDO VEIN HARVEST  10/30/2014    Performed by Mando Vieira M.D. at SURGERY [facility]    RECOVERY  10/20/2014    Performed by Cath-Recovery Surgery  "at SURGERY SAME DAY [facility]    ZZZ CARDIAC CATH  2014    OTHER      polyp removal (cervical?)    DE  DELIVERY ONLY      TUBAL COAGULATION LAPAROSCOPIC BILATERAL      27yo    TUBAL LIGATION       Exam:   /80 (BP Location: Left arm, Patient Position: Sitting, BP Cuff Size: Adult)   Pulse 66   Temp 36.3 °C (97.4 °F) (Temporal)   Ht 1.499 m (4' 11\")   Wt 60.9 kg (134 lb 3.2 oz)   SpO2 96%  Body mass index is 27.11 kg/m².    Constitutional: Well-developed and well-nourished. No acute distress.   Skin: Skin is warm and dry. No rash noted.  Head: Atraumatic without lesions.  Eyes: Conjunctivae and extraocular motions are normal. Pupils are equal, round. No scleral icterus.   Ears:  External ears unremarkable. Tympanic membranes clear and intact.  Nose: Nares patent. No discharge.  Mouth/Throat: Oropharynx is clear and moist. Posterior pharynx without erythema or exudates. Good dentition.   Neck: Supple, trachea midline. Normal range of motion. No thyromegaly present. No lymphadenopathy--cervical or supraclavicular.  Cardiovascular: Regular rate and rhythm, S1 and S2 without murmur, rubs, or gallops.  Lungs: Normal inspiratory effort, CTA bilaterally, no wheezes/rhonchi/rales  Abdomen: Soft, non tender, and without distention. Active bowel sounds. No rebound, guarding, masses or HSM.  Extremities: No cyanosis, clubbing, erythema, nor edema.  Neurological: Alert and oriented x 3. No gross/focal deficits.  Psychiatric:  Behavior, mood, and affect are appropriate.    Labs: 2023    Assessment and Plan. The following treatment and monitoring plan is recommended:      1. Encounter for annual general medical examination without abnormal findings in adult  Services suggested: No services needed at this time  Health Care Screening: Age-appropriate preventive services recommended by USPTF and ACIP covered by Medicare were discussed today. Services ordered if indicated and agreed upon by the " patient. Immunizations discussed/declined.   Referrals offered: Community-based lifestyle interventions to reduce health risks and promote self-management and wellness, fall prevention, nutrition, physical activity, tobacco-use cessation, weight loss, and mental health services as per orders if indicated.    Discussion today about general wellness and lifestyle habits:    Prevent falls and reduce trip hazards; Cautioned about securing or removing rugs.  Engage in regular physical activity and social activities   Discussed age-appropriate anticipatory guidance regarding: Dental care, healthy diet and exercise.    2. Thrombocytopenia (HCC)  Chronic, stable/mild.  Gave return precautions for spontaneous bruising/bleeding.  Trend in 6 months with B12 and folic acid.  - CBC WITH DIFFERENTIAL; Future  - VITAMIN B12; Future  - FOLATE; Future    3. Hyperlipidemia, mixed  4. Coronary artery disease involving native coronary artery of native heart without angina pectoris  5. S/P CABG x 3  Chronic, well controlled. Continue Lipitor as below and 81mg ASA daily for secondary prevention.  - atorvastatin (LIPITOR) 40 MG Tab; Take 1 Tablet by mouth every day.  Dispense: 100 Tablet; Refill: 3    6. Prediabetes  Chronic, improved from prior.  Discussed importance of healthy diet and exercise to prevent progression.  Will trend annually.    7. Hypothyroidism, unspecified type  Chronic, well controlled. Continue medication as below.  - levothyroxine (SYNTHROID) 88 MCG Tab; Take 1 Tablet by mouth every morning on an empty stomach.  Dispense: 90 Tablet; Refill: 3  - TSH WITH REFLEX TO FT4; Future    8. Essential hypertension, benign  Chronic, well controlled. Continue metoprolol as below.   - metoprolol tartrate (LOPRESSOR) 25 MG Tab; Take 0.5 Tablets by mouth 2 times a day.  Dispense: 100 Tablet; Refill: 3    9. History of herpes genitalis  Fortunately has not had an outbreak in quite some time, provided with acyclovir as needed for  outbreaks.  - acyclovir (ZOVIRAX) 800 MG Tab; Take 1 Tablet by mouth 3 times a day. For 2 days as needed for break outs  Dispense: 18 Tablet; Refill: 1    10. Stage 3a chronic kidney disease (HCC)  Chronic condition, stable. BP well controlled. Avoid nephrotoxic medications. Trend in 6m.  - Basic Metabolic Panel; Future    Follow-up: Return in about 6 months (around 8/8/2023), or if symptoms worsen or fail to improve.

## 2023-02-14 PROBLEM — R94.30 NONSPECIFIC ABNORMAL FUNCTION STUDY, CARDIOVASCULAR: Status: ACTIVE | Noted: 2023-02-14

## 2023-07-11 ENCOUNTER — HOSPITAL ENCOUNTER (OUTPATIENT)
Dept: LAB | Facility: MEDICAL CENTER | Age: 68
End: 2023-07-11
Attending: STUDENT IN AN ORGANIZED HEALTH CARE EDUCATION/TRAINING PROGRAM
Payer: MEDICARE

## 2023-07-11 DIAGNOSIS — E03.9 HYPOTHYROIDISM, UNSPECIFIED TYPE: ICD-10-CM

## 2023-07-11 DIAGNOSIS — N18.31 STAGE 3A CHRONIC KIDNEY DISEASE: ICD-10-CM

## 2023-07-11 DIAGNOSIS — D69.6 THROMBOCYTOPENIA (HCC): ICD-10-CM

## 2023-07-11 LAB
ANION GAP SERPL CALC-SCNC: 13 MMOL/L (ref 7–16)
BASOPHILS # BLD AUTO: 1.1 % (ref 0–1.8)
BASOPHILS # BLD: 0.06 K/UL (ref 0–0.12)
BUN SERPL-MCNC: 21 MG/DL (ref 8–22)
CALCIUM SERPL-MCNC: 9.4 MG/DL (ref 8.5–10.5)
CHLORIDE SERPL-SCNC: 105 MMOL/L (ref 96–112)
CO2 SERPL-SCNC: 25 MMOL/L (ref 20–33)
CREAT SERPL-MCNC: 1.23 MG/DL (ref 0.5–1.4)
EOSINOPHIL # BLD AUTO: 0.35 K/UL (ref 0–0.51)
EOSINOPHIL NFR BLD: 6.4 % (ref 0–6.9)
ERYTHROCYTE [DISTWIDTH] IN BLOOD BY AUTOMATED COUNT: 43 FL (ref 35.9–50)
FASTING STATUS PATIENT QL REPORTED: NORMAL
FOLATE SERPL-MCNC: 8.8 NG/ML
GFR SERPLBLD CREATININE-BSD FMLA CKD-EPI: 48 ML/MIN/1.73 M 2
GLUCOSE SERPL-MCNC: 100 MG/DL (ref 65–99)
HCT VFR BLD AUTO: 46.6 % (ref 37–47)
HGB BLD-MCNC: 14.9 G/DL (ref 12–16)
IMM GRANULOCYTES # BLD AUTO: 0.01 K/UL (ref 0–0.11)
IMM GRANULOCYTES NFR BLD AUTO: 0.2 % (ref 0–0.9)
LYMPHOCYTES # BLD AUTO: 1.44 K/UL (ref 1–4.8)
LYMPHOCYTES NFR BLD: 26.5 % (ref 22–41)
MCH RBC QN AUTO: 29.5 PG (ref 27–33)
MCHC RBC AUTO-ENTMCNC: 32 G/DL (ref 32.2–35.5)
MCV RBC AUTO: 92.3 FL (ref 81.4–97.8)
MONOCYTES # BLD AUTO: 0.31 K/UL (ref 0–0.85)
MONOCYTES NFR BLD AUTO: 5.7 % (ref 0–13.4)
NEUTROPHILS # BLD AUTO: 3.26 K/UL (ref 1.82–7.42)
NEUTROPHILS NFR BLD: 60.1 % (ref 44–72)
NRBC # BLD AUTO: 0 K/UL
NRBC BLD-RTO: 0 /100 WBC (ref 0–0.2)
PLATELET # BLD AUTO: 165 K/UL (ref 164–446)
PMV BLD AUTO: 14 FL (ref 9–12.9)
POTASSIUM SERPL-SCNC: 4.4 MMOL/L (ref 3.6–5.5)
RBC # BLD AUTO: 5.05 M/UL (ref 4.2–5.4)
SODIUM SERPL-SCNC: 143 MMOL/L (ref 135–145)
TSH SERPL DL<=0.005 MIU/L-ACNC: 2.67 UIU/ML (ref 0.38–5.33)
VIT B12 SERPL-MCNC: 1592 PG/ML (ref 211–911)
WBC # BLD AUTO: 5.4 K/UL (ref 4.8–10.8)

## 2023-07-11 PROCEDURE — 36415 COLL VENOUS BLD VENIPUNCTURE: CPT

## 2023-07-11 PROCEDURE — 82746 ASSAY OF FOLIC ACID SERUM: CPT

## 2023-07-11 PROCEDURE — 80048 BASIC METABOLIC PNL TOTAL CA: CPT

## 2023-07-11 PROCEDURE — 82607 VITAMIN B-12: CPT

## 2023-07-11 PROCEDURE — 85025 COMPLETE CBC W/AUTO DIFF WBC: CPT

## 2023-07-11 PROCEDURE — 84443 ASSAY THYROID STIM HORMONE: CPT

## 2023-08-31 ENCOUNTER — OFFICE VISIT (OUTPATIENT)
Dept: MEDICAL GROUP | Facility: PHYSICIAN GROUP | Age: 68
End: 2023-08-31
Payer: MEDICARE

## 2023-08-31 VITALS
DIASTOLIC BLOOD PRESSURE: 82 MMHG | SYSTOLIC BLOOD PRESSURE: 144 MMHG | BODY MASS INDEX: 28.22 KG/M2 | WEIGHT: 140 LBS | RESPIRATION RATE: 20 BRPM | HEART RATE: 77 BPM | TEMPERATURE: 97.2 F | HEIGHT: 59 IN | OXYGEN SATURATION: 96 %

## 2023-08-31 DIAGNOSIS — E03.9 HYPOTHYROIDISM, UNSPECIFIED TYPE: ICD-10-CM

## 2023-08-31 DIAGNOSIS — E66.3 OVERWEIGHT: ICD-10-CM

## 2023-08-31 DIAGNOSIS — N18.31 STAGE 3A CHRONIC KIDNEY DISEASE: ICD-10-CM

## 2023-08-31 DIAGNOSIS — R74.8 ELEVATED VITAMIN B12 LEVEL: ICD-10-CM

## 2023-08-31 DIAGNOSIS — I10 PRIMARY HYPERTENSION: ICD-10-CM

## 2023-08-31 DIAGNOSIS — R73.03 PREDIABETES: ICD-10-CM

## 2023-08-31 PROBLEM — D69.6 THROMBOCYTOPENIA (HCC): Status: RESOLVED | Noted: 2023-02-08 | Resolved: 2023-08-31

## 2023-08-31 PROCEDURE — 99214 OFFICE O/P EST MOD 30 MIN: CPT | Performed by: STUDENT IN AN ORGANIZED HEALTH CARE EDUCATION/TRAINING PROGRAM

## 2023-08-31 PROCEDURE — 3077F SYST BP >= 140 MM HG: CPT | Performed by: STUDENT IN AN ORGANIZED HEALTH CARE EDUCATION/TRAINING PROGRAM

## 2023-08-31 PROCEDURE — 3079F DIAST BP 80-89 MM HG: CPT | Performed by: STUDENT IN AN ORGANIZED HEALTH CARE EDUCATION/TRAINING PROGRAM

## 2023-08-31 ASSESSMENT — ENCOUNTER SYMPTOMS
FEVER: 0
NAUSEA: 0
SHORTNESS OF BREATH: 0
DIARRHEA: 0
COUGH: 0
ABDOMINAL PAIN: 0
WEIGHT LOSS: 0
PALPITATIONS: 0
MYALGIAS: 0
HEARTBURN: 0
VOMITING: 0
CHILLS: 0
CONSTIPATION: 0

## 2023-08-31 ASSESSMENT — FIBROSIS 4 INDEX: FIB4 SCORE: 1.4

## 2023-08-31 NOTE — PATIENT INSTRUCTIONS
Ideal blood pressure <130/80 and at least <140/90.  If <100 for the top number we are over treating.    Bring in machine to cross check. If elevated, we can consider adding 25mg losartan daily.    Can reduce B12 by 50%    I will be transferring to the clinic below 10/10/2023.  [facility] [place]  [id number] [place] [address]

## 2023-08-31 NOTE — PROGRESS NOTES
"Subjective:     Chief Complaint   Patient presents with    Follow-Up       HPI:   Malena presents today for follow-up and lab review.    Patient feeling well without complaints aside from last night she did not sleep well and had a bit of a headache which is gone.  States that she has not been checking her blood pressure at home but does have a machine.  States that she has had a stressful morning with her neighbors dogs getting out and having a nightmare about her annoying friend coming back into town.    She continues with metoprolol 12.5 mg twice daily.  Denies any chest pain, shortness of breath, palpitations or lower extremity edema.    Patient notes that she did gain weight from last visit but states that she has been cooking more often since her 's home.  States that her diet is good but the portions are larger.     Taking B12 daily as a supplement.     Review of Systems   Constitutional:  Negative for chills, fever, malaise/fatigue and weight loss.   Respiratory:  Negative for cough and shortness of breath.    Cardiovascular:  Negative for chest pain, palpitations and leg swelling.   Gastrointestinal:  Negative for abdominal pain, constipation, diarrhea, heartburn, nausea and vomiting.   Musculoskeletal:  Negative for myalgias.     Objective:     Exam:  BP (!) 144/82 (BP Location: Left arm, Patient Position: Sitting, BP Cuff Size: Adult)   Pulse 77   Temp 36.2 °C (97.2 °F) (Temporal)   Resp 20   Ht 1.499 m (4' 11\")   Wt 63.5 kg (140 lb)   SpO2 96%   BMI 28.28 kg/m²  Body mass index is 28.28 kg/m².    Physical Exam  Vitals reviewed.   Constitutional:       General: She is not in acute distress.     Appearance: Normal appearance. She is not ill-appearing.   HENT:      Head: Normocephalic and atraumatic.      Nose: Nose normal.      Mouth/Throat:      Mouth: Mucous membranes are moist.   Eyes:      Conjunctiva/sclera: Conjunctivae normal.   Cardiovascular:      Rate and Rhythm: Normal rate and " regular rhythm.      Heart sounds: Normal heart sounds. No murmur heard.  Pulmonary:      Effort: Pulmonary effort is normal. No respiratory distress.      Breath sounds: Normal breath sounds. No stridor. No wheezing, rhonchi or rales.   Musculoskeletal:      Cervical back: Normal range of motion and neck supple. No rigidity or tenderness.      Right lower leg: No edema.      Left lower leg: No edema.   Neurological:      General: No focal deficit present.      Mental Status: She is alert and oriented to person, place, and time.   Psychiatric:         Mood and Affect: Mood normal.         Behavior: Behavior normal.         Thought Content: Thought content normal.       Labs: Reviewed from 1/31/2023, 7/11/2023    Assessment & Plan:     68 y.o. female with the following -     1. Stage 3a chronic kidney disease (HCC)  Chronic condition, overall stable. BP as below, had side effects to ACE inhibitor prior and continues with beta-blocker given cardiac history. Avoid nephrotoxic medications and discussed adequate hydration. Trend in 6m.    2. Primary hypertension  Chronic, typically controlled but elevated persistently even on repeat today in clinic.  Patient prefers to monitor at home rather than adding medication.  Continue below medication(s). Discussed ideal ranges/return precautions and ways to improve with lifestyle choices.  Patient to bring in her machine to her next appointment, will see the cardiologist in about a month.  Given CKD would consider adding ARB to her regimen if indicated.      metoprolol tartrate (LOPRESSOR) 25 MG Tab, Take 0.5 Tablets by mouth 2 times a day., Disp: 100 Tablet, Rfl: 3    3. Hypothyroidism, unspecified type  Chronic, controlled. Continue medication(s) as below.    levothyroxine (SYNTHROID) 88 MCG Tab, Take 1 Tablet by mouth every morning on an empty stomach., Disp: 90 Tablet, Rfl: 3    4. Overweight  5. BMI 28.0-28.9,adult  Chronically overweight, worse from prior.  Discussed ways  to improve with diet.    6. Elevated vitamin B12 level  Checked B12 due to mild thrombocytopenia which is now resolved.  Okay to reduce vitamin B12 supplement by 50%.    7. Prediabetes  Patient was fasting on most recent lab work. Encouraged diabetic diet and exercise as tolerated. Trend A1C next year.    Return if symptoms worsen or fail to improve, for establish care.    Please note that this dictation was created using voice recognition software. I have made every reasonable attempt to correct obvious errors, but I expect that there are errors of grammar and possibly content that I did not discover before finalizing the note.

## 2023-09-26 ENCOUNTER — OFFICE VISIT (OUTPATIENT)
Dept: CARDIOLOGY | Facility: MEDICAL CENTER | Age: 68
End: 2023-09-26
Attending: INTERNAL MEDICINE
Payer: MEDICARE

## 2023-09-26 VITALS
DIASTOLIC BLOOD PRESSURE: 90 MMHG | SYSTOLIC BLOOD PRESSURE: 150 MMHG | OXYGEN SATURATION: 96 % | HEIGHT: 59 IN | HEART RATE: 60 BPM | BODY MASS INDEX: 27.82 KG/M2 | RESPIRATION RATE: 16 BRPM | WEIGHT: 138 LBS

## 2023-09-26 DIAGNOSIS — Z95.1 S/P CABG X 3: ICD-10-CM

## 2023-09-26 DIAGNOSIS — I25.10 CORONARY ARTERY DISEASE INVOLVING NATIVE CORONARY ARTERY OF NATIVE HEART WITHOUT ANGINA PECTORIS: ICD-10-CM

## 2023-09-26 DIAGNOSIS — I10 PRIMARY HYPERTENSION: ICD-10-CM

## 2023-09-26 DIAGNOSIS — E78.2 MIXED HYPERLIPIDEMIA: ICD-10-CM

## 2023-09-26 PROCEDURE — 3077F SYST BP >= 140 MM HG: CPT | Performed by: INTERNAL MEDICINE

## 2023-09-26 PROCEDURE — 99214 OFFICE O/P EST MOD 30 MIN: CPT | Performed by: INTERNAL MEDICINE

## 2023-09-26 PROCEDURE — 99212 OFFICE O/P EST SF 10 MIN: CPT | Performed by: INTERNAL MEDICINE

## 2023-09-26 PROCEDURE — 3080F DIAST BP >= 90 MM HG: CPT | Performed by: INTERNAL MEDICINE

## 2023-09-26 RX ORDER — AMLODIPINE BESYLATE 5 MG/1
5 TABLET ORAL DAILY
Qty: 30 TABLET | Refills: 11 | Status: SHIPPED | OUTPATIENT
Start: 2023-09-26 | End: 2023-10-13 | Stop reason: SDUPTHER

## 2023-09-26 RX ORDER — LOSARTAN POTASSIUM 25 MG/1
25 TABLET ORAL DAILY
Qty: 30 TABLET | Refills: 11 | Status: SHIPPED | OUTPATIENT
Start: 2023-09-26 | End: 2023-09-26

## 2023-09-26 ASSESSMENT — ENCOUNTER SYMPTOMS
NAUSEA: 0
WEAKNESS: 0
CHILLS: 0
CONSTITUTIONAL NEGATIVE: 1
DEPRESSION: 0
GASTROINTESTINAL NEGATIVE: 1
BLURRED VISION: 0
PALPITATIONS: 0
BRUISES/BLEEDS EASILY: 0
CARDIOVASCULAR NEGATIVE: 1
NEUROLOGICAL NEGATIVE: 1
ABDOMINAL PAIN: 0
HEADACHES: 0
FEVER: 0
DIZZINESS: 0
COUGH: 0
DOUBLE VISION: 0
FOCAL WEAKNESS: 0
EYES NEGATIVE: 1
SHORTNESS OF BREATH: 0
MUSCULOSKELETAL NEGATIVE: 1
PSYCHIATRIC NEGATIVE: 1
MYALGIAS: 0
VOMITING: 0
CLAUDICATION: 0
NERVOUS/ANXIOUS: 0
RESPIRATORY NEGATIVE: 1
WEIGHT LOSS: 0

## 2023-09-26 ASSESSMENT — FIBROSIS 4 INDEX: FIB4 SCORE: 1.4

## 2023-09-26 NOTE — PROGRESS NOTES
Chief Complaint   Patient presents with    Coronary Artery Disease     F/v dx: Coronary artery disease involving native coronary artery of native heart without angina pectoris    MI (Non ST Segment Elevation MI)       Subjective     Malena Borden is a 68 y.o. female who presents today for annual follow up of coronary artery disease with prior coronary artery bypass graft surgery.    Since the patient's last visit on 22, she has been doing well clinically. She denies chest pain, shortness of breath, palpitations, nausea/vomiting or diaphoresis. She keeps active walking.      Past Medical History:   Diagnosis Date    CAD (coronary artery disease) 2014    Multivessel CAD    Heart attack (HCC) 2014    NSTEMI    High cholesterol     Hyperlipidemia     Hypertension     Indigestion     Thrombocytopenia (HCC) 2023    Thyroid disease      Past Surgical History:   Procedure Laterality Date    MULTIPLE CORONARY ARTERY BYPASS ENDO VEIN HARVEST  10/30/2014    Performed by Mando Vieira M.D. at SURGERY [facility] ORS    RECOVERY  10/20/2014    Performed by Cath-Recovery Surgery at SURGERY SAME DAY [facility] ORS    Z CARDIAC CATH  2014    OTHER      polyp removal (cervical?)    MS  DELIVERY ONLY      TUBAL COAGULATION LAPAROSCOPIC BILATERAL      25yo    TUBAL LIGATION       Family History   Problem Relation Age of Onset    Lung Disease Mother         COPD    Lung Disease Brother         COPD    No Known Problems Son     Colon Cancer Neg Hx     Breast Cancer Neg Hx      Social History     Socioeconomic History    Marital status: Single     Spouse name: Not on file    Number of children: 1    Years of education: Not on file    Highest education level: Not on file   Occupational History    Not on file   Tobacco Use    Smoking status: Never    Smokeless tobacco: Never    Tobacco comments:     passive exposure (mother, worked in a [employer])   Vaping Use    Vaping Use: Never used    Substance and Sexual Activity    Alcohol use: Not Currently    Drug use: No    Sexual activity: Not on file   Other Topics Concern    Not on file   Social History Narrative    Lives with boyfriend. Has 3 dogs, getting a forth next week.          Social Determinants of Health     Financial Resource Strain: Low Risk  (2/7/2023)    Overall Financial Resource Strain (CARDIA)     Difficulty of Paying Living Expenses: Not very hard   Food Insecurity: Unknown (2/7/2023)    Hunger Vital Sign     Worried About Running Out of Food in the Last Year: Patient refused     Ran Out of Food in the Last Year: Patient refused   Transportation Needs: No Transportation Needs (2/7/2023)    PRAPARE - Transportation     Lack of Transportation (Medical): No     Lack of Transportation (Non-Medical): No   Physical Activity: Inactive (2/7/2023)    Exercise Vital Sign     Days of Exercise per Week: 3 days     Minutes of Exercise per Session: 0 min   Stress: Stress Concern Present (2/7/2023)    [employer] - Occupational Stress Questionnaire     Feeling of Stress : To some extent   Social Connections: Moderately Isolated (2/7/2023)    Social Connection and Isolation Panel [NHANES]     Frequency of Communication with Friends and Family: More than three times a week     Frequency of Social Gatherings with Friends and Family: Once a week     Attends Rastafarian Services: Never     Active Member of Clubs or Organizations: No     Attends Club or Organization Meetings: Never     Marital Status: Living with partner   Intimate Partner Violence: Not on file   Housing Stability: Unknown (2/7/2023)    Housing Stability Vital Sign     Unable to Pay for Housing in the Last Year: Patient refused     Number of Places Lived in the Last Year: Not on file     Unstable Housing in the Last Year: Patient refused     Allergies   Allergen Reactions    Ace Inhibitors Cough     (Medications reviewed.)  Outpatient Encounter Medications as of  9/26/2023   Medication Sig Dispense Refill    atorvastatin (LIPITOR) 40 MG Tab Take 1 Tablet by mouth every day. 100 Tablet 3    levothyroxine (SYNTHROID) 88 MCG Tab Take 1 Tablet by mouth every morning on an empty stomach. 90 Tablet 3    metoprolol tartrate (LOPRESSOR) 25 MG Tab Take 0.5 Tablets by mouth 2 times a day. 100 Tablet 3    acyclovir (ZOVIRAX) 800 MG Tab Take 1 Tablet by mouth 3 times a day. For 2 days as needed for break outs 18 Tablet 1    nitroglycerin (NITROSTAT) 0.4 MG SL Tab Place 1 Tablet under the tongue as needed for Chest Pain. 25 Tablet 3    Cyanocobalamin (VITAMIN B-12 PO) Take  by mouth every day.      Chlorpheniramine Maleate (ALLERGY 4 HOUR PO) Take  by mouth as needed.      vitamin D (CHOLECALCIFEROL) 1000 UNIT Tab Take 1 Tablet by mouth every day.      aspirin 81 MG tablet Take 1 Tablet by mouth every day.      [DISCONTINUED] losartan (COZAAR) 25 MG Tab Take 1 Tablet by mouth every day. 30 Tablet 11     No facility-administered encounter medications on file as of 9/26/2023.     Review of Systems   Constitutional: Negative.  Negative for chills, fever, malaise/fatigue and weight loss.   HENT: Negative.  Negative for hearing loss.    Eyes: Negative.  Negative for blurred vision and double vision.   Respiratory: Negative.  Negative for cough and shortness of breath.    Cardiovascular: Negative.  Negative for chest pain, palpitations, claudication and leg swelling.   Gastrointestinal: Negative.  Negative for abdominal pain, nausea and vomiting.   Genitourinary: Negative.  Negative for dysuria and urgency.   Musculoskeletal: Negative.  Negative for joint pain and myalgias.   Skin: Negative.  Negative for itching and rash.   Neurological: Negative.  Negative for dizziness, focal weakness, weakness and headaches.   Endo/Heme/Allergies: Negative.  Does not bruise/bleed easily.   Psychiatric/Behavioral: Negative.  Negative for depression. The patient is not nervous/anxious.          "      Objective     BP (!) 150/90 (BP Location: Left arm, Patient Position: Sitting, BP Cuff Size: Adult)   Pulse 60   Resp 16   Ht 1.499 m (4' 11\")   Wt 62.6 kg (138 lb)   SpO2 96%   BMI 27.87 kg/m²     Physical Exam  Constitutional:       Appearance: Normal appearance. She is well-developed and normal weight.   HENT:      Head: Normocephalic and atraumatic.   Neck:      Vascular: No JVD.   Cardiovascular:      Rate and Rhythm: Normal rate and regular rhythm.      Heart sounds: Normal heart sounds.   Pulmonary:      Effort: Pulmonary effort is normal.      Breath sounds: Normal breath sounds.   Abdominal:      General: Bowel sounds are normal.      Palpations: Abdomen is soft.      Comments: No hepatosplenomegaly.   Musculoskeletal:         General: Normal range of motion.   Lymphadenopathy:      Cervical: No cervical adenopathy.   Skin:     General: Skin is warm and dry.   Neurological:      Mental Status: She is alert and oriented to person, place, and time.            CARDIAC STUDIES/PROCEDURES:     ANKLE BRACHIAL INDEX (07/20/17)  No evidence of arterial insufficiency.      CARDIAC CATHETERIZATION CONCLUSIONS (06/06/19)  1.  Native 3-vessel coronary artery disease with patent left internal mammary   artery to the left anterior descending artery, patent saphenous vein graft to   an obtuse marginal branch, patent saphenous vein graft to the distal right   coronary artery.  2.  Mildly reduced left ventricular systolic function with ejection fraction of 40%.  3.  Mildly elevated left ventricular end-diastolic pressure.     CAROTID ULTRASOUND (07/20/17)  Mild bilateral internal carotid artery stenosis (<50%).     ECHOCARDIOGRAM CONCLUSIONS (06/10/20)  Prior study done 07/20/2017, compared to the report of the study done -   there has been no significant change.   Normal left ventricular systolic function.  Left ventricular ejection fraction is visually estimated to be 65%.  No significant valve abnormalities. "      ECHOCARDIOGRAM CONCLUSIONS (07/20/17)  Normal left ventricular size and systolic function.   Normal diastolic function.   Mild concentric left ventricular hypertrophy.   Normal regional wall motion.   Left ventricular ejection fraction is visually estimated to be 60%.  Compared to the report of the study done 6/2014- there has been no   significant change     EKG performed on (03/22/22) EKG shows sinus rhythm.  EKG performed on (06/05/19) EKG shows sinus bradycardia.     Laboratory results of (01/31/23) were reviewed. Cholesterol profile of 135/153/47/57 mg/dL noted.   Laboratory results of (01/06/22) Cholesterol profile of 156/123/51/80 mg/dL noted.  Laboratory results of (01/06/21) Cholesterol profile of 151/109/49/80 mg/dL noted.  Laboratory results of (01/15/20) Cholesterol profile of 152/107/54/77 noted.   Laboratory results of (11/14/19) Cholesterol profile of 177/146/52/96 noted.     PET SCAN (05/22/19)  1.  No ischemic ST-T wave changes are noted.  2.  Large area of complete reversibility in the entire anterolateral wall,   concerning for ischemia.  3.  Normal left ventricular systolic function as discussed above.  4.  Transient ischemic dilatation is noted, concerning for 3-vessel disease.    Dr. Ana Muller was updated about the above findings at the time of the read.     Assessment & Plan     1. Coronary artery disease involving native coronary artery of native heart without angina pectoris        2. S/P CABG x 3        3. Primary hypertension  DISCONTINUED: losartan (COZAAR) 25 MG Tab      4. Hyperlipidemia, mixed            Medical Decision Making: Today's Assessment/Status/Plan:        Coronary artery disease with prior coronary bypass graft (x4 with left internal mammary to left anterior descending, reverse saphenous vein graft separately to the first obtuse marginal branch and the distal obtuse marginal branch and the right coronary artery just proximal to the varun by Mando Elliott  10/30/14): She is clinically doing well. I will continue with current medical care including aspirin, metoprolol and atorvastatin.  Hypertension: Blood pressure has been high. We will start amlodipine and reassess the blood pressure.   Hyperlipidemia: She is doing well on statin therapy without myalgia symptoms. .  Medication intolerance: She is intolerant to lisinopril due to cough.     We will follow up in 3 months.    CC Fatou Mcallister

## 2023-10-13 ENCOUNTER — OFFICE VISIT (OUTPATIENT)
Dept: MEDICAL GROUP | Facility: PHYSICIAN GROUP | Age: 68
End: 2023-10-13
Payer: MEDICARE

## 2023-10-13 VITALS
HEIGHT: 59 IN | WEIGHT: 142 LBS | SYSTOLIC BLOOD PRESSURE: 132 MMHG | BODY MASS INDEX: 28.63 KG/M2 | HEART RATE: 69 BPM | DIASTOLIC BLOOD PRESSURE: 84 MMHG | TEMPERATURE: 98.1 F | RESPIRATION RATE: 16 BRPM | OXYGEN SATURATION: 94 %

## 2023-10-13 DIAGNOSIS — E03.9 HYPOTHYROIDISM, UNSPECIFIED TYPE: ICD-10-CM

## 2023-10-13 DIAGNOSIS — I10 PRIMARY HYPERTENSION: ICD-10-CM

## 2023-10-13 DIAGNOSIS — Z76.89 ENCOUNTER TO ESTABLISH CARE: ICD-10-CM

## 2023-10-13 DIAGNOSIS — Z86.19 HISTORY OF HERPES GENITALIS: ICD-10-CM

## 2023-10-13 DIAGNOSIS — N18.31 STAGE 3A CHRONIC KIDNEY DISEASE: ICD-10-CM

## 2023-10-13 DIAGNOSIS — R73.03 PREDIABETES: ICD-10-CM

## 2023-10-13 DIAGNOSIS — Z95.1 S/P CABG X 3: ICD-10-CM

## 2023-10-13 PROBLEM — E66.3 OVERWEIGHT: Status: RESOLVED | Noted: 2023-08-31 | Resolved: 2023-10-13

## 2023-10-13 PROCEDURE — 3075F SYST BP GE 130 - 139MM HG: CPT

## 2023-10-13 PROCEDURE — 3079F DIAST BP 80-89 MM HG: CPT

## 2023-10-13 PROCEDURE — 99214 OFFICE O/P EST MOD 30 MIN: CPT

## 2023-10-13 RX ORDER — AMLODIPINE BESYLATE 5 MG/1
5 TABLET ORAL DAILY
Qty: 100 TABLET | Refills: 3 | Status: SHIPPED | OUTPATIENT
Start: 2023-10-13

## 2023-10-13 ASSESSMENT — FIBROSIS 4 INDEX: FIB4 SCORE: 1.4

## 2023-10-13 NOTE — ASSESSMENT & PLAN NOTE
Managed/seen by cardiology for this.  Continue metoprolol 12.5 mg twice daily, aspirin 81 mg daily, atorvastatin 40 mg daily.

## 2023-10-13 NOTE — PROGRESS NOTES
"Subjective:     CC: Diagnoses of Encounter to establish care, Primary hypertension, S/P CABG x 3, History of herpes genitalis, Hypothyroidism, unspecified type, Stage 3a chronic kidney disease (HCC), and Prediabetes were pertinent to this visit.    Pt presents today to establish care with me. Prior PCP Lane.   She has a long term s.o., family in the area.   Retired from [employer] supervisor.     HPI:   Malena presents today with    Problem   Prediabetes   History of Herpes Genitalis   Hypothyroidism   Stage 3a Chronic Kidney Disease (Hcc)   S/P Cabg X 3    LIMA-LAD; SVG to OM1 and distal OM (jump graft); SVG to RCA.  10/2014     Hypertension   Overweight (Resolved)   Bmi 28.0-28.9,Adult (Resolved)     ROS:  Review of Systems   All other systems reviewed and are negative.      Objective:     Exam:  /84 (BP Location: Left arm, Patient Position: Sitting, BP Cuff Size: Small adult)   Pulse 69   Temp 36.7 °C (98.1 °F) (Temporal)   Resp 16   Ht 1.499 m (4' 11\")   Wt 64.4 kg (142 lb)   SpO2 94%   BMI 28.68 kg/m²  Body mass index is 28.68 kg/m².    Physical Exam  Vitals reviewed.   Constitutional:       General: She is not in acute distress.     Appearance: Normal appearance. She is not ill-appearing.   HENT:      Head: Normocephalic and atraumatic.   Cardiovascular:      Rate and Rhythm: Normal rate and regular rhythm.      Pulses: Normal pulses.      Heart sounds: No murmur heard.  Pulmonary:      Effort: Pulmonary effort is normal. No respiratory distress.      Breath sounds: Normal breath sounds.   Skin:     General: Skin is warm and dry.   Neurological:      General: No focal deficit present.      Mental Status: She is alert and oriented to person, place, and time.   Psychiatric:         Mood and Affect: Mood normal.         Behavior: Behavior normal.         Labs:    Latest Reference Range & Units 01/31/23 10:15 07/11/23 08:14   WBC 4.8 - 10.8 K/uL 5.5 5.4   RBC 4.20 - 5.40 M/uL 5.32 5.05 "   Hemoglobin 12.0 - 16.0 g/dL 15.9 14.9   Hematocrit 37.0 - 47.0 % 48.5 (H) 46.6   MCV 81.4 - 97.8 fL 91.2 92.3   MCH 27.0 - 33.0 pg 29.9 29.5   MCHC 32.2 - 35.5 g/dL 32.8 (L) 32.0 (L)   RDW 35.9 - 50.0 fL 40.6 43.0   Platelet Count 164 - 446 K/uL 147 (L) 165   MPV 9.0 - 12.9 fL 14.0 (H) 14.0 (H)   Neutrophils-Polys 44.00 - 72.00 % 57.00 60.10   Neutrophils (Absolute) 1.82 - 7.42 K/uL 3.14 3.26   Lymphocytes 22.00 - 41.00 % 31.10 26.50   Lymphs (Absolute) 1.00 - 4.80 K/uL 1.71 1.44   Monocytes 0.00 - 13.40 % 6.20 5.70   Monos (Absolute) 0.00 - 0.85 K/uL 0.34 0.31   Eosinophils 0.00 - 6.90 % 4.00 6.40   Eos (Absolute) 0.00 - 0.51 K/uL 0.22 0.35   Basophils 0.00 - 1.80 % 1.30 1.10   Baso (Absolute) 0.00 - 0.12 K/uL 0.07 0.06   Immature Granulocytes 0.00 - 0.90 % 0.40 0.20   Immature Granulocytes (abs) 0.00 - 0.11 K/uL 0.02 0.01   Nucleated RBC 0.00 - 0.20 /100 WBC 0.40 0.00   NRBC (Absolute) K/uL 0.02 0.00   Sodium 135 - 145 mmol/L 140 143   Potassium 3.6 - 5.5 mmol/L 4.2 4.4   Chloride 96 - 112 mmol/L 102 105   Co2 20 - 33 mmol/L 24 25   Anion Gap 7.0 - 16.0  14.0 13.0   Glucose 65 - 99 mg/dL 95 100 (H)   Bun 8 - 22 mg/dL 21 21   Creatinine 0.50 - 1.40 mg/dL 1.14 1.23   GFR (CKD-EPI) >60 mL/min/1.73 m 2 52 ! 48 !   Calcium 8.5 - 10.5 mg/dL 9.9 9.4   Correct Calcium 8.5 - 10.5 mg/dL 9.7    AST(SGOT) 12 - 45 U/L 15    ALT(SGPT) 2 - 50 U/L 20    Alkaline Phosphatase 30 - 99 U/L 105 (H)    Total Bilirubin 0.1 - 1.5 mg/dL 0.8    Albumin 3.2 - 4.9 g/dL 4.3    Total Protein 6.0 - 8.2 g/dL 7.1    Globulin 1.9 - 3.5 g/dL 2.8    A-G Ratio g/dL 1.5    Phosphorus 2.5 - 4.5 mg/dL 3.9    Glycohemoglobin 4.0 - 5.6 % 6.0 (H)    Estim. Avg Glu mg/dL 126    Fasting Status  Fasting Fasting   Cholesterol,Tot 100 - 199 mg/dL 135    Triglycerides 0 - 149 mg/dL 153 (H)    HDL >=40 mg/dL 47    LDL <100 mg/dL 57    Micro Alb Creat Ratio 0 - 30 mg/g see below    Creatinine, Urine mg/dL 31.35    Microalbumin, Urine Random mg/dL <1.2     25-Hydroxy   Vitamin D 25 30 - 100 ng/mL 74    Folate -Folic Acid >4.0 ng/mL  8.8   Vitamin B12 -True Cobalamin 211 - 911 pg/mL  1592 (H)   TSH 0.380 - 5.330 uIU/mL 0.420 2.670   Pth, Intact 14.0 - 72.0 pg/mL 46.1    (H): Data is abnormally high  (L): Data is abnormally low  !: Data is abnormal    Assessment & Plan:     68 y.o. female with the following -     Problem List Items Addressed This Visit       Hypertension     Chronic, stable.  Blood pressure in clinic today 132/84.  Continue amlodipine 5 mg daily.Continue healthy lifestyle recommendations         Relevant Medications    amLODIPine (NORVASC) 5 MG Tab    S/P CABG x 3     Managed/seen by cardiology for this.  Continue metoprolol 12.5 mg twice daily, aspirin 81 mg daily, atorvastatin 40 mg daily.         History of herpes genitalis     Chronic, stable.  Continue acyclovir 800 mg 3 times daily with onset of prodrome.         Hypothyroidism     Chronic, stable.  Continue levothyroxine 88 mcg daily.         Stage 3a chronic kidney disease (HCC)     Stable  No uremic symptoms  Renal dose of medication  Avoid nephrotoxins  Continue same medication regimen             Prediabetes     Chronic, unstable. Discussed healthy lifestyle recommendations.             Other Visit Diagnoses       Encounter to establish care              Patient was educated in proper administration of medication(s) ordered today including safety, possible SE, risks, benefits, rationale and alternatives to therapy.   Supportive care, differential diagnoses, and indications for immediate follow-up discussed with patient.    Pathogenesis of diagnosis discussed including typical length and natural progression.    Instructed to return to clinic or nearest emergency department for any change in condition, further concerns, or worsening of symptoms.  Patient states understanding of the plan of care and discharge instructions.    Return in about 4 months (around 2/1/2024) for wellness,  Labs.    Please note that this dictation was created using voice recognition software. I have made every reasonable attempt to correct obvious errors, but I expect that there are errors of grammar and possibly content that I did not discover before finalizing the note.

## 2023-10-13 NOTE — ASSESSMENT & PLAN NOTE
Stable  No uremic symptoms  Renal dose of medication  Avoid nephrotoxins  Continue same medication regimen

## 2023-10-13 NOTE — ASSESSMENT & PLAN NOTE
Chronic, stable.  Blood pressure in clinic today 132/84.  Continue amlodipine 5 mg daily.Continue healthy lifestyle recommendations

## 2024-01-09 DIAGNOSIS — E03.9 HYPOTHYROIDISM, UNSPECIFIED TYPE: ICD-10-CM

## 2024-01-09 DIAGNOSIS — R73.03 PREDIABETES: ICD-10-CM

## 2024-01-09 DIAGNOSIS — E55.9 VITAMIN D DEFICIENCY: ICD-10-CM

## 2024-01-09 DIAGNOSIS — R73.09 ELEVATED GLUCOSE LEVEL: ICD-10-CM

## 2024-01-09 DIAGNOSIS — E78.2 MIXED HYPERLIPIDEMIA: ICD-10-CM

## 2024-01-09 DIAGNOSIS — Z13.6 SCREENING FOR CARDIOVASCULAR CONDITION: ICD-10-CM

## 2024-01-09 DIAGNOSIS — Z13.0 SCREENING FOR DEFICIENCY ANEMIA: ICD-10-CM

## 2024-01-09 DIAGNOSIS — Z13.29 THYROID DISORDER SCREEN: ICD-10-CM

## 2024-01-23 ENCOUNTER — HOSPITAL ENCOUNTER (OUTPATIENT)
Dept: LAB | Facility: MEDICAL CENTER | Age: 69
End: 2024-01-23
Payer: MEDICARE

## 2024-01-23 DIAGNOSIS — R73.03 PREDIABETES: ICD-10-CM

## 2024-01-23 DIAGNOSIS — Z13.6 SCREENING FOR CARDIOVASCULAR CONDITION: ICD-10-CM

## 2024-01-23 DIAGNOSIS — Z13.29 THYROID DISORDER SCREEN: ICD-10-CM

## 2024-01-23 DIAGNOSIS — E03.9 HYPOTHYROIDISM, UNSPECIFIED TYPE: ICD-10-CM

## 2024-01-23 DIAGNOSIS — E55.9 VITAMIN D DEFICIENCY: ICD-10-CM

## 2024-01-23 DIAGNOSIS — Z13.0 SCREENING FOR DEFICIENCY ANEMIA: ICD-10-CM

## 2024-01-23 DIAGNOSIS — E78.2 MIXED HYPERLIPIDEMIA: ICD-10-CM

## 2024-01-23 DIAGNOSIS — R73.09 ELEVATED GLUCOSE LEVEL: ICD-10-CM

## 2024-01-23 LAB
25(OH)D3 SERPL-MCNC: 86 NG/ML (ref 30–100)
ALBUMIN SERPL BCP-MCNC: 4.6 G/DL (ref 3.2–4.9)
ALBUMIN/GLOB SERPL: 1.5 G/DL
ALP SERPL-CCNC: 121 U/L (ref 30–99)
ALT SERPL-CCNC: 21 U/L (ref 2–50)
ANION GAP SERPL CALC-SCNC: 15 MMOL/L (ref 7–16)
AST SERPL-CCNC: 22 U/L (ref 12–45)
BILIRUB SERPL-MCNC: 0.7 MG/DL (ref 0.1–1.5)
BUN SERPL-MCNC: 22 MG/DL (ref 8–22)
CALCIUM ALBUM COR SERPL-MCNC: 8.9 MG/DL (ref 8.5–10.5)
CALCIUM SERPL-MCNC: 9.4 MG/DL (ref 8.5–10.5)
CHLORIDE SERPL-SCNC: 104 MMOL/L (ref 96–112)
CHOLEST SERPL-MCNC: 151 MG/DL (ref 100–199)
CO2 SERPL-SCNC: 22 MMOL/L (ref 20–33)
CREAT SERPL-MCNC: 1.16 MG/DL (ref 0.5–1.4)
ERYTHROCYTE [DISTWIDTH] IN BLOOD BY AUTOMATED COUNT: 42.4 FL (ref 35.9–50)
EST. AVERAGE GLUCOSE BLD GHB EST-MCNC: 120 MG/DL
GFR SERPLBLD CREATININE-BSD FMLA CKD-EPI: 51 ML/MIN/1.73 M 2
GLOBULIN SER CALC-MCNC: 3.1 G/DL (ref 1.9–3.5)
GLUCOSE SERPL-MCNC: 90 MG/DL (ref 65–99)
HBA1C MFR BLD: 5.8 % (ref 4–5.6)
HCT VFR BLD AUTO: 48.5 % (ref 37–47)
HDLC SERPL-MCNC: 54 MG/DL
HGB BLD-MCNC: 15.9 G/DL (ref 12–16)
LDLC SERPL CALC-MCNC: 73 MG/DL
MCH RBC QN AUTO: 30.3 PG (ref 27–33)
MCHC RBC AUTO-ENTMCNC: 32.8 G/DL (ref 32.2–35.5)
MCV RBC AUTO: 92.6 FL (ref 81.4–97.8)
PLATELET # BLD AUTO: 170 K/UL (ref 164–446)
PMV BLD AUTO: 14.3 FL (ref 9–12.9)
POTASSIUM SERPL-SCNC: 3.8 MMOL/L (ref 3.6–5.5)
PROT SERPL-MCNC: 7.7 G/DL (ref 6–8.2)
RBC # BLD AUTO: 5.24 M/UL (ref 4.2–5.4)
SODIUM SERPL-SCNC: 141 MMOL/L (ref 135–145)
TRIGL SERPL-MCNC: 119 MG/DL (ref 0–149)
TSH SERPL DL<=0.005 MIU/L-ACNC: 4.42 UIU/ML (ref 0.38–5.33)
WBC # BLD AUTO: 5.9 K/UL (ref 4.8–10.8)

## 2024-01-23 PROCEDURE — 83036 HEMOGLOBIN GLYCOSYLATED A1C: CPT

## 2024-01-23 PROCEDURE — 85027 COMPLETE CBC AUTOMATED: CPT

## 2024-01-23 PROCEDURE — 36415 COLL VENOUS BLD VENIPUNCTURE: CPT

## 2024-01-23 PROCEDURE — 80061 LIPID PANEL: CPT

## 2024-01-23 PROCEDURE — 82306 VITAMIN D 25 HYDROXY: CPT

## 2024-01-23 PROCEDURE — 80053 COMPREHEN METABOLIC PANEL: CPT

## 2024-01-23 PROCEDURE — 84443 ASSAY THYROID STIM HORMONE: CPT

## 2024-01-29 ENCOUNTER — OFFICE VISIT (OUTPATIENT)
Dept: MEDICAL GROUP | Facility: PHYSICIAN GROUP | Age: 69
End: 2024-01-29
Payer: MEDICARE

## 2024-01-29 VITALS
DIASTOLIC BLOOD PRESSURE: 80 MMHG | RESPIRATION RATE: 20 BRPM | BODY MASS INDEX: 29.07 KG/M2 | TEMPERATURE: 97.9 F | OXYGEN SATURATION: 98 % | HEIGHT: 59 IN | WEIGHT: 144.2 LBS | SYSTOLIC BLOOD PRESSURE: 132 MMHG | HEART RATE: 79 BPM

## 2024-01-29 DIAGNOSIS — I25.118 CORONARY ARTERY DISEASE OF NATIVE ARTERY OF NATIVE HEART WITH STABLE ANGINA PECTORIS (HCC): ICD-10-CM

## 2024-01-29 DIAGNOSIS — R73.03 PREDIABETES: ICD-10-CM

## 2024-01-29 DIAGNOSIS — N18.31 STAGE 3A CHRONIC KIDNEY DISEASE: ICD-10-CM

## 2024-01-29 DIAGNOSIS — E03.9 HYPOTHYROIDISM, UNSPECIFIED TYPE: ICD-10-CM

## 2024-01-29 DIAGNOSIS — Z86.19 HISTORY OF HERPES GENITALIS: ICD-10-CM

## 2024-01-29 DIAGNOSIS — I25.10 CORONARY ARTERY DISEASE INVOLVING NATIVE CORONARY ARTERY OF NATIVE HEART WITHOUT ANGINA PECTORIS: ICD-10-CM

## 2024-01-29 DIAGNOSIS — I10 ESSENTIAL HYPERTENSION, BENIGN: ICD-10-CM

## 2024-01-29 DIAGNOSIS — I10 PRIMARY HYPERTENSION: ICD-10-CM

## 2024-01-29 DIAGNOSIS — E78.2 MIXED HYPERLIPIDEMIA: ICD-10-CM

## 2024-01-29 DIAGNOSIS — Z12.11 SCREENING FOR COLORECTAL CANCER: ICD-10-CM

## 2024-01-29 DIAGNOSIS — D69.6 THROMBOCYTOPENIA, UNSPECIFIED (HCC): ICD-10-CM

## 2024-01-29 DIAGNOSIS — M81.0 AGE-RELATED OSTEOPOROSIS WITHOUT CURRENT PATHOLOGICAL FRACTURE: ICD-10-CM

## 2024-01-29 DIAGNOSIS — Z95.1 S/P CABG X 3: ICD-10-CM

## 2024-01-29 DIAGNOSIS — Z12.12 SCREENING FOR COLORECTAL CANCER: ICD-10-CM

## 2024-01-29 PROCEDURE — 3075F SYST BP GE 130 - 139MM HG: CPT

## 2024-01-29 PROCEDURE — 99397 PER PM REEVAL EST PAT 65+ YR: CPT

## 2024-01-29 PROCEDURE — 3079F DIAST BP 80-89 MM HG: CPT

## 2024-01-29 RX ORDER — ATORVASTATIN CALCIUM 40 MG/1
40 TABLET, FILM COATED ORAL DAILY
Qty: 100 TABLET | Refills: 3 | Status: SHIPPED | OUTPATIENT
Start: 2024-01-29

## 2024-01-29 RX ORDER — ACYCLOVIR 800 MG/1
800 TABLET ORAL 3 TIMES DAILY
Qty: 30 TABLET | Refills: 1 | Status: SHIPPED | OUTPATIENT
Start: 2024-01-29

## 2024-01-29 RX ORDER — LEVOTHYROXINE SODIUM 88 UG/1
88 TABLET ORAL
Qty: 90 TABLET | Refills: 3 | Status: SHIPPED | OUTPATIENT
Start: 2024-01-29

## 2024-01-29 RX ORDER — NITROGLYCERIN 0.4 MG/1
0.4 TABLET SUBLINGUAL PRN
Qty: 25 TABLET | Refills: 3 | Status: SHIPPED | OUTPATIENT
Start: 2024-01-29

## 2024-01-29 ASSESSMENT — FIBROSIS 4 INDEX: FIB4 SCORE: 1.95

## 2024-01-29 ASSESSMENT — PATIENT HEALTH QUESTIONNAIRE - PHQ9: CLINICAL INTERPRETATION OF PHQ2 SCORE: 0

## 2024-01-29 NOTE — LETTER
[facility]  ANNABELLE Borjas  [address]  [place] [address]  Fax: [phone]   Authorization for Release/Disclosure of   Protected Health Information   Name: MALENA JUNE : 1955 SSN: [id number]   Address: [address] [place] [address]  [place] [address] Phone:    [phone] (home)    I authorize the entity listed below to release/disclose the PHI below to:   [facility]/ANNABELLE Borjas and ANNABELLE Borjas   Provider or Entity Name:  {[facility] COLORECTAL SCREENING LOCATIONS:[id number]}   Reason for request: continuity of care   Information to be released:    [ X ] LAST COLONOSCOPY,  including any PATH REPORT and follow-up  [ X ] LAST FIT/COLOGUARD RESULT [  ] LAST DEXA  [  ] LAST MAMMOGRAM  [  ] LAST PAP  [  ] LAST LABS [  ] RETINA EXAM REPORT  [  ] IMMUNIZATION RECORDS  [  ] Release all info      [  ] Check here and initial the line next to each item to release ALL health information INCLUDING  _____ Care and treatment for drug and / or alcohol abuse  _____ HIV testing, infection status, or AIDS  _____ Genetic Testing    DATES OF SERVICE OR TIME PERIOD TO BE DISCLOSED: _____________  I understand and acknowledge that:  * This Authorization may be revoked at any time by you in writing, except if your health information has already been used or disclosed.  * Your health information that will be used or disclosed as a result of you signing this authorization could be re-disclosed by the recipient. If this occurs, your re-disclosed health information may no longer be protected by State or Federal laws.  * You may refuse to sign this Authorization. Your refusal will not affect your ability to obtain treatment.  * This Authorization becomes effective upon signing and will  on (date) __________.      If no date is indicated, this Authorization will  one (1) year from the signature date.    Name: Malena June    Signature:   Date:      1/29/2024       PLEASE FAX REQUESTED RECORDS BACK TO: [phone]

## 2024-01-29 NOTE — ASSESSMENT & PLAN NOTE
Chronic, stable. Continue metoprolol 12.5 mg BID, asa 81 mg daily, nitrostat as needed for chest pain.

## 2024-01-29 NOTE — ASSESSMENT & PLAN NOTE
Chronic, stable. Bp in clinic today 132/80. Continue metoprolol 12.5 mg daily, amlodipine 5 mg daily.

## 2024-01-29 NOTE — ASSESSMENT & PLAN NOTE
Chronic, stable.   No uremic symptoms  Renal dose of medication  Avoid nephrotoxins  Continue same medication regimen  Recheck labs in 6-12 months

## 2024-01-29 NOTE — PROGRESS NOTES
HCC Gap Form    Diagnosis to address: N18.31 - Stage 3a chronic kidney disease (HCC)  Assessment and plan: Chronic, stable. Continue with current defined treatment plan: avoid nephrotoxins, continue blood pressure control, labs at least annually. Follow-up at least annually.  Diagnosis: I25.118 - Coronary artery disease of native artery of native heart with stable angina pectoris (HCC)  Assessment and plan: Chronic, stable, as based on today's assessment and impact on other conditions evaluated today. Continue with current treatment plan: continue bp control, healthy lifestyle recommendations. Follow-up with specialist as directed, but at least annually.  Last edited 24 13:16 PST by ANNABELLE Borjas           Subjective:     CC: Diagnoses of Stage 3a chronic kidney disease (HCC), Coronary artery disease of native artery of native heart with stable angina pectoris (HCC), Thrombocytopenia, unspecified (HCC), Prediabetes, Hypothyroidism, unspecified type, Hyperlipidemia, mixed, S/P CABG x 3, Coronary artery disease involving native coronary artery of native heart without angina pectoris, Essential hypertension, benign, Primary hypertension, Age-related osteoporosis without current pathological fracture, Screening for colorectal cancer, and History of herpes genitalis were pertinent to this visit.    Pt presents today for wellness, she denies any new concerns or issues.     HPI:   Malena presents today with    Problem   Prediabetes   Hypothyroidism   Stage 3a Chronic Kidney Disease (Hcc)   Age-Related Osteoporosis Without Current Pathological Fracture   S/P Cabg X 3    LIMA-LAD; SVG to OM1 and distal OM (jump graft); SVG to RCA.  10/2014     Hypertension     Health Maintenance: Completed  Cancer screening:   Colorectal cancer screenin24 cologuard ordered  Cervical cancer screening: aged out  Breast cancer screenin/3/24 due  Infectious disease screening/Immunizaitons  -STI screening:  "declines  Practices safe sex.  -Immunizaitons:   Influenza: declines  Tetanus: up-to-date: due 4/8/25  Zoster: recommended  Covid: recommended  Pneumonia: completed  Anticipatory Guidance:  Elicits she is eating a variety of fresh veggies/most days, sometimes fruits, variety of meats,   limited fast food/junk food  Soda: rare  Exercise: recommended 150 minutes/week: walking/stairs in home, dog walks  Substance Abuse: no  Safe in relationship. Yes:so  Seat belts, bike helmet, gun safety discussed.  Sun protection used.    ROS:  Review of Systems   All other systems reviewed and are negative.      Objective:     Exam:  /80 (BP Location: Left arm, Patient Position: Sitting, BP Cuff Size: Adult)   Pulse 79   Temp 36.6 °C (97.9 °F) (Temporal)   Resp 20   Ht 1.499 m (4' 11\")   Wt 65.4 kg (144 lb 3.2 oz)   SpO2 98%   BMI 29.12 kg/m²  Body mass index is 29.12 kg/m².    Physical Exam  Vitals reviewed.   Constitutional:       General: She is not in acute distress.     Appearance: Normal appearance. She is well-groomed. She is obese. She is not ill-appearing.   HENT:      Head: Normocephalic and atraumatic.      Right Ear: Tympanic membrane, ear canal and external ear normal.      Left Ear: Tympanic membrane, ear canal and external ear normal.      Nose: Nose normal.      Mouth/Throat:      Mouth: Mucous membranes are moist.      Pharynx: Oropharynx is clear.   Eyes:      Extraocular Movements: Extraocular movements intact.      Conjunctiva/sclera: Conjunctivae normal.      Pupils: Pupils are equal, round, and reactive to light.   Neck:      Thyroid: No thyromegaly.      Trachea: Trachea normal.   Cardiovascular:      Rate and Rhythm: Normal rate and regular rhythm.      Pulses: Normal pulses.      Heart sounds: Normal heart sounds. No murmur heard.  Pulmonary:      Effort: Pulmonary effort is normal. No respiratory distress.      Breath sounds: Normal breath sounds. No wheezing.   Abdominal:      General: Bowel " sounds are normal.   Musculoskeletal:         General: No swelling, tenderness or deformity. Normal range of motion.      Cervical back: Full passive range of motion without pain.   Lymphadenopathy:      Cervical: No cervical adenopathy.   Skin:     General: Skin is warm and dry.      Capillary Refill: Capillary refill takes less than 2 seconds.      Findings: No rash.   Neurological:      General: No focal deficit present.      Mental Status: She is alert and oriented to person, place, and time. Mental status is at baseline.      Cranial Nerves: No cranial nerve deficit.      Sensory: No sensory deficit.      Motor: No weakness.   Psychiatric:         Mood and Affect: Mood normal.         Behavior: Behavior normal.         Labs:    Latest Reference Range & Units 01/23/24 08:30   WBC 4.8 - 10.8 K/uL 5.9   RBC 4.20 - 5.40 M/uL 5.24   Hemoglobin 12.0 - 16.0 g/dL 15.9   Hematocrit 37.0 - 47.0 % 48.5 (H)   MCV 81.4 - 97.8 fL 92.6   MCH 27.0 - 33.0 pg 30.3   MCHC 32.2 - 35.5 g/dL 32.8   RDW 35.9 - 50.0 fL 42.4   Platelet Count 164 - 446 K/uL 170   MPV 9.0 - 12.9 fL 14.3 (H)   Sodium 135 - 145 mmol/L 141   Potassium 3.6 - 5.5 mmol/L 3.8   Chloride 96 - 112 mmol/L 104   Co2 20 - 33 mmol/L 22   Anion Gap 7.0 - 16.0  15.0   Glucose 65 - 99 mg/dL 90   Bun 8 - 22 mg/dL 22   Creatinine 0.50 - 1.40 mg/dL 1.16   GFR (CKD-EPI) >60 mL/min/1.73 m 2 51 !   Calcium 8.5 - 10.5 mg/dL 9.4   Correct Calcium 8.5 - 10.5 mg/dL 8.9   AST(SGOT) 12 - 45 U/L 22   ALT(SGPT) 2 - 50 U/L 21   Alkaline Phosphatase 30 - 99 U/L 121 (H)   Total Bilirubin 0.1 - 1.5 mg/dL 0.7   Albumin 3.2 - 4.9 g/dL 4.6   Total Protein 6.0 - 8.2 g/dL 7.7   Globulin 1.9 - 3.5 g/dL 3.1   A-G Ratio g/dL 1.5   Glycohemoglobin 4.0 - 5.6 % 5.8 (H)   Estim. Avg Glu mg/dL 120   Cholesterol,Tot 100 - 199 mg/dL 151   Triglycerides 0 - 149 mg/dL 119   HDL >=40 mg/dL 54   LDL <100 mg/dL 73   25-Hydroxy   Vitamin D 25 30 - 100 ng/mL 86   TSH 0.380 - 5.330 uIU/mL 4.420   (H): Data is  abnormally high  !: Data is abnormal    Assessment & Plan:     68 y.o. female with the following -     Problem List Items Addressed This Visit       Hypertension     Chronic, stable. Bp in clinic today 132/80. Continue metoprolol 12.5 mg daily, amlodipine 5 mg daily.         Relevant Medications    atorvastatin (LIPITOR) 40 MG Tab    metoprolol tartrate (LOPRESSOR) 25 MG Tab    nitroglycerin (NITROSTAT) 0.4 MG SL Tab    Coronary artery disease involving native coronary artery without angina pectoris    Relevant Medications    atorvastatin (LIPITOR) 40 MG Tab    metoprolol tartrate (LOPRESSOR) 25 MG Tab    nitroglycerin (NITROSTAT) 0.4 MG SL Tab    Hyperlipidemia, mixed    Relevant Medications    atorvastatin (LIPITOR) 40 MG Tab    metoprolol tartrate (LOPRESSOR) 25 MG Tab    nitroglycerin (NITROSTAT) 0.4 MG SL Tab    S/P CABG x 3     Chronic, stable. Continue metoprolol 12.5 mg BID, asa 81 mg daily, nitrostat as needed for chest pain.         Relevant Medications    atorvastatin (LIPITOR) 40 MG Tab    nitroglycerin (NITROSTAT) 0.4 MG SL Tab    History of herpes genitalis    Relevant Medications    acyclovir (ZOVIRAX) 800 MG Tab    Hypothyroidism     Chronic, stable .continue levothyroxine 88 mcg daily. Plan to recheck annually.         Relevant Medications    levothyroxine (SYNTHROID) 88 MCG Tab    Stage 3a chronic kidney disease (HCC)     Chronic, stable.   No uremic symptoms  Renal dose of medication  Avoid nephrotoxins  Continue same medication regimen  Recheck labs in 6-12 months           Age-related osteoporosis without current pathological fracture     Chronic, stable. Continue vitamin D/calcium daily. Declines additional medication.  DEXA 8/23/2022  FINDINGS:  The lumbar spine has a mean bone mineral density of 0.856 g/cm2, with a T score of -2.6 and a Z score of -0.9.   The proximal left femur has a mean bone mineral density of 0.839 g/cm2, with a T score of -1.3 and a Z score of 0.1.     When compared with  the most recent study dated 9/21/2012, there has been a 10.1% increase in the bone mineral density of the proximal left femur.     IMPRESSION:     According to the World Health Organization classification, bone mineral density of this patient is osteoporotic spine and osteopenic in the femur.         Prediabetes     Chronic, stable. Improved from previous reading. Discussed healthy lifestyle recommendations.   Plan to recheck 6-12 months         Coronary artery disease of native artery with stable angina pectoris (HCC)    Relevant Medications    atorvastatin (LIPITOR) 40 MG Tab    metoprolol tartrate (LOPRESSOR) 25 MG Tab    nitroglycerin (NITROSTAT) 0.4 MG SL Tab     Other Visit Diagnoses       Thrombocytopenia, unspecified (HCC)        Essential hypertension, benign        Relevant Medications    atorvastatin (LIPITOR) 40 MG Tab    metoprolol tartrate (LOPRESSOR) 25 MG Tab    nitroglycerin (NITROSTAT) 0.4 MG SL Tab    Screening for colorectal cancer        Relevant Orders    Cologuard Colon Cancer Screening (FIT DNA)          Patient was educated in proper administration of medication(s) ordered today including safety, possible SE, risks, benefits, rationale and alternatives to therapy.   Supportive care, differential diagnoses, and indications for immediate follow-up discussed with patient.    Pathogenesis of diagnosis discussed including typical length and natural progression.    Instructed to return to clinic or nearest emergency department for any change in condition, further concerns, or worsening of symptoms.  Patient states understanding of the plan of care and discharge instructions.    Return in about 6 months (around 7/29/2024), or if symptoms worsen or fail to improve, for Blood Pressure.    Please note that this dictation was created using voice recognition software. I have made every reasonable attempt to correct obvious errors, but I expect that there are errors of grammar and possibly content that I  did not discover before finalizing the note.

## 2024-01-29 NOTE — ASSESSMENT & PLAN NOTE
Chronic, stable. Continue vitamin D/calcium daily. Declines additional medication.  DEXA 8/23/2022  FINDINGS:  The lumbar spine has a mean bone mineral density of 0.856 g/cm2, with a T score of -2.6 and a Z score of -0.9.   The proximal left femur has a mean bone mineral density of 0.839 g/cm2, with a T score of -1.3 and a Z score of 0.1.     When compared with the most recent study dated 9/21/2012, there has been a 10.1% increase in the bone mineral density of the proximal left femur.     IMPRESSION:     According to the World Health Organization classification, bone mineral density of this patient is osteoporotic spine and osteopenic in the femur.

## 2024-01-29 NOTE — ASSESSMENT & PLAN NOTE
Chronic, stable. Improved from previous reading. Discussed healthy lifestyle recommendations.   Plan to recheck 6-12 months

## 2024-02-21 NOTE — TELEPHONE ENCOUNTER
Attempted to call pt at [phone]. Voicemail left with callback instructions.  
Called pt and discussed PET results as noted by Dr. Muller. All questions answered, pt verbalized understanding. She is agreeable to the angiogram as previously discussed in the office. Discussed ED precautions.    To Dr. Muller: Please place order for angiogram    To Shaq: Please call pt and schedule  
I called her and left a message on her phone    Images of the PET scan show a large defect with 3 times daily consistent with flow-limiting coronary disease.  I discussed with Dr. Randall on the telephone    We will set up an angiogram with possible intervention as we discussed in the office    If she has immediate concerns she will call us or go to the emergency room.  This can be done in the next week or 2 as long as she is feeling stable  
Orders need to be put into [facility]. They are not in there.  
Patient is scheduled on 6-6-19 for a C w/poss with Dr. Rene. No meds to stop and patient to check in at 6:00 for a 7:30 procedure. H&P was done on 5-9-19 by Dr. CRISPIN Muller.   
Include Location In Plan?: Yes
Hide Include Location In Plan Question?: No
Detail Level: Generalized
Detail Level: Detailed

## 2024-04-02 ENCOUNTER — TELEPHONE (OUTPATIENT)
Dept: HEALTH INFORMATION MANAGEMENT | Facility: OTHER | Age: 69
End: 2024-04-02

## 2024-05-17 ENCOUNTER — OFFICE VISIT (OUTPATIENT)
Dept: CARDIOLOGY | Facility: MEDICAL CENTER | Age: 69
End: 2024-05-17
Attending: INTERNAL MEDICINE
Payer: MEDICARE

## 2024-05-17 VITALS
SYSTOLIC BLOOD PRESSURE: 112 MMHG | HEIGHT: 59 IN | WEIGHT: 143 LBS | OXYGEN SATURATION: 97 % | BODY MASS INDEX: 28.83 KG/M2 | RESPIRATION RATE: 16 BRPM | HEART RATE: 67 BPM | DIASTOLIC BLOOD PRESSURE: 64 MMHG

## 2024-05-17 DIAGNOSIS — Z95.1 S/P CABG X 3: ICD-10-CM

## 2024-05-17 DIAGNOSIS — I25.10 CORONARY ARTERY DISEASE INVOLVING NATIVE CORONARY ARTERY OF NATIVE HEART WITHOUT ANGINA PECTORIS: ICD-10-CM

## 2024-05-17 DIAGNOSIS — E78.2 MIXED HYPERLIPIDEMIA: ICD-10-CM

## 2024-05-17 DIAGNOSIS — I10 PRIMARY HYPERTENSION: ICD-10-CM

## 2024-05-17 PROBLEM — I25.118 CORONARY ARTERY DISEASE OF NATIVE ARTERY WITH STABLE ANGINA PECTORIS (HCC): Status: RESOLVED | Noted: 2022-06-16 | Resolved: 2024-05-17

## 2024-05-17 PROCEDURE — 3078F DIAST BP <80 MM HG: CPT | Performed by: INTERNAL MEDICINE

## 2024-05-17 PROCEDURE — 3074F SYST BP LT 130 MM HG: CPT | Performed by: INTERNAL MEDICINE

## 2024-05-17 PROCEDURE — 99214 OFFICE O/P EST MOD 30 MIN: CPT | Performed by: INTERNAL MEDICINE

## 2024-05-17 RX ORDER — ATORVASTATIN CALCIUM 80 MG/1
80 TABLET, FILM COATED ORAL NIGHTLY
Qty: 90 TABLET | Refills: 3 | Status: SHIPPED | OUTPATIENT
Start: 2024-05-17

## 2024-05-17 ASSESSMENT — ENCOUNTER SYMPTOMS
DEPRESSION: 0
VOMITING: 0
SHORTNESS OF BREATH: 0
ABDOMINAL PAIN: 0
FOCAL WEAKNESS: 0
WEIGHT LOSS: 0
CHILLS: 0
COUGH: 0
RESPIRATORY NEGATIVE: 1
FEVER: 0
NERVOUS/ANXIOUS: 0
NEUROLOGICAL NEGATIVE: 1
BLURRED VISION: 0
CONSTITUTIONAL NEGATIVE: 1
MYALGIAS: 0
CARDIOVASCULAR NEGATIVE: 1
PALPITATIONS: 0
NAUSEA: 0
GASTROINTESTINAL NEGATIVE: 1
DIZZINESS: 0
EYES NEGATIVE: 1
WEAKNESS: 0
DOUBLE VISION: 0
PSYCHIATRIC NEGATIVE: 1
CLAUDICATION: 0
MUSCULOSKELETAL NEGATIVE: 1
BRUISES/BLEEDS EASILY: 0
HEADACHES: 0

## 2024-05-17 ASSESSMENT — FIBROSIS 4 INDEX: FIB4 SCORE: 1.95

## 2024-05-17 NOTE — PROGRESS NOTES
Chief Complaint   Patient presents with    Coronary Artery Disease     F/v dx: Coronary artery disease involving native coronary artery of native heart without angina pectoris    Hyperlipidemia    Hypertension       Subjective     Malena Borden is a 68 y.o. female who presents today for follow up of coronary artery disease with prior coronary artery bypass graft surgery.    Since the patient's last visit on 23, she has been doing well clinically from cardiac standpoint. She denies fatigue, chest pain, shortness of breath, palpitations, lower extremity edema, dizziness or syncope. On the last visit she was started on amlodipine and is tolerating this medication well without side effects. She has not been walking as much.    Past Medical History:   Diagnosis Date    BMI 28.0-28.9,adult 2022    CAD (coronary artery disease) 2014    Multivessel CAD    Heart attack (HCC) 2014    NSTEMI    High cholesterol     Hyperlipidemia     Hypertension     Indigestion     Overweight 2023    Thrombocytopenia (HCC) 2023    Thyroid disease      Past Surgical History:   Procedure Laterality Date    MULTIPLE CORONARY ARTERY BYPASS ENDO VEIN HARVEST  10/30/2014    Performed by Mando Vieira M.D. at SURGERY [facility] ORS    RECOVERY  10/20/2014    Performed by Cath-Recovery Surgery at SURGERY SAME DAY [facility] CARDIAC CATH  2014    OTHER      polyp removal (cervical?)    [place]  DELIVERY ONLY      PRIMARY C SECTION      TUBAL COAGULATION LAPAROSCOPIC BILATERAL      25yo    TUBAL LIGATION       Family History   Problem Relation Age of Onset    Cancer Mother         lung    Lung Disease Mother         COPD    Brain Aneurysm Brother     Lung Disease Brother         COPD    No Known Problems Son     Colon Cancer Neg Hx     Breast Cancer Neg Hx     Colorectal Cancer Neg Hx     Peritoneal Cancer Neg Hx     Tubal Cancer Neg Hx     Ovarian Cancer Neg Hx     Diabetes Neg Hx     Heart  Disease Neg Hx     Hypertension Neg Hx     Hyperlipidemia Neg Hx     Stroke Neg Hx      Social History     Socioeconomic History    Marital status: Single     Spouse name: Not on file    Number of children: 1    Years of education: Not on file    Highest education level: Not on file   Occupational History    Not on file   Tobacco Use    Smoking status: Never    Smokeless tobacco: Never    Tobacco comments:     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 Has 3 dogs, getting a forth next week.          Social Determinants of Health     Financial Resource Strain: Low Risk  (2/7/2023)    Overall Financial Resource Strain (CARDIA)     Difficulty of Paying Living Expenses: Not very hard   Food Insecurity: Unknown (2/7/2023)    Hunger Vital Sign     Worried About Running Out of Food in the Last Year: Patient declined     Ran Out of Food in the Last Year: Patient declined   Transportation Needs: No Transportation Needs (2/7/2023)    PRAPARE - Transportation     Lack of Transportation (Medical): No     Lack of Transportation (Non-Medical): No   Physical Activity: Inactive (2/7/2023)    Exercise Vital Sign     Days of Exercise per Week: 3 days     Minutes of Exercise per Session: 0 min   Stress: Stress Concern Present (2/7/2023)    [employer] [place] [employer] - Occupational Stress Questionnaire     Feeling of Stress : To some extent   Social Connections: Moderately Isolated (2/7/2023)    Social Connection and Isolation Panel [NHANES]     Frequency of Communication with Friends and Family: More than three times a week     Frequency of Social Gatherings with Friends and Family: Once a week     Attends Religion Services: Never      Active Member of Clubs or Organizations: No     Attends Club or Organization Meetings: Never     Marital Status: Living with partner   Intimate Partner Violence: Not on file   Housing Stability: Unknown (2/7/2023)    Housing Stability Vital Sign     Unable to Pay for Housing in the Last Year: Patient refused     Number of Places Lived in the Last Year: Not on file     Unstable Housing in the Last Year: Patient refused     Allergies   Allergen Reactions    Ace Inhibitors Cough     (Medications reviewed.)  Outpatient Encounter Medications as of 5/17/2024   Medication Sig Dispense Refill    levothyroxine (SYNTHROID) 88 MCG Tab Take 1 Tablet by mouth every morning on an empty stomach. 90 Tablet 3    atorvastatin (LIPITOR) 40 MG Tab Take 1 Tablet by mouth every day. 100 Tablet 3    metoprolol tartrate (LOPRESSOR) 25 MG Tab Take 0.5 Tablets by mouth 2 times a day. 100 Tablet 3    nitroglycerin (NITROSTAT) 0.4 MG SL Tab Place 1 Tablet under the tongue as needed for Chest Pain. 25 Tablet 3    acyclovir (ZOVIRAX) 800 MG Tab Take 1 Tablet by mouth 3 times a day. For 2 days as needed for break outs 30 Tablet 1    amLODIPine (NORVASC) 5 MG Tab Take 1 Tablet by mouth every day. 100 Tablet 3    Cyanocobalamin (VITAMIN B-12 PO) Take  by mouth every day.      Chlorpheniramine Maleate (ALLERGY 4 HOUR PO) Take  by mouth as needed.      vitamin D (CHOLECALCIFEROL) 1000 UNIT Tab Take 1 Tablet by mouth every day.      aspirin 81 MG tablet Take 1 Tablet by mouth every day.       No facility-administered encounter medications on file as of 5/17/2024.     Review of Systems   Constitutional: Negative.  Negative for chills, fever, malaise/fatigue and weight loss.   HENT: Negative.  Negative for hearing loss.    Eyes: Negative.  Negative for blurred vision and double vision.   Respiratory: Negative.  Negative for cough and shortness of breath.    Cardiovascular: Negative.  Negative for chest pain, palpitations, claudication and leg swelling.  "  Gastrointestinal: Negative.  Negative for abdominal pain, nausea and vomiting.   Genitourinary: Negative.  Negative for dysuria and urgency.   Musculoskeletal: Negative.  Negative for joint pain and myalgias.   Skin: Negative.  Negative for itching and rash.   Neurological: Negative.  Negative for dizziness, focal weakness, weakness and headaches.   Endo/Heme/Allergies: Negative.  Does not bruise/bleed easily.   Psychiatric/Behavioral: Negative.  Negative for depression. The patient is not nervous/anxious.               Objective     /64 (BP Location: Left arm, Patient Position: Sitting, BP Cuff Size: Adult)   Pulse 67   Resp 16   Ht 1.499 m (4' 11\")   Wt 64.9 kg (143 lb)   SpO2 97%   BMI 28.88 kg/m²     Physical Exam  Constitutional:       Appearance: Normal appearance. She is well-developed and normal weight.   HENT:      Head: Normocephalic and atraumatic.   Neck:      Vascular: No JVD.   Cardiovascular:      Rate and Rhythm: Normal rate and regular rhythm.      Heart sounds: Normal heart sounds.   Pulmonary:      Effort: Pulmonary effort is normal.      Breath sounds: Normal breath sounds.   Abdominal:      General: Bowel sounds are normal.      Palpations: Abdomen is soft.      Comments: No hepatosplenomegaly.   Musculoskeletal:         General: Normal range of motion.   Lymphadenopathy:      Cervical: No cervical adenopathy.   Skin:     General: Skin is warm and dry.   Neurological:      Mental Status: She is alert and oriented to person, place, and time.            CARDIAC STUDIES/PROCEDURES:     ANKLE BRACHIAL INDEX (07/20/17)  No evidence of arterial insufficiency.      CARDIAC CATHETERIZATION CONCLUSIONS (06/06/19)  1.  Native 3-vessel coronary artery disease with patent left internal mammary   artery to the left anterior descending artery, patent saphenous vein graft to   an obtuse marginal branch, patent saphenous vein graft to the distal right   coronary artery.  2.  Mildly reduced left " ventricular systolic function with ejection fraction of 40%.  3.  Mildly elevated left ventricular end-diastolic pressure.     CAROTID ULTRASOUND (07/20/17)  Mild bilateral internal carotid artery stenosis (<50%).     ECHOCARDIOGRAM CONCLUSIONS (06/10/20)  Prior study done 07/20/2017, compared to the report of the study done -   there has been no significant change.   Normal left ventricular systolic function.  Left ventricular ejection fraction is visually estimated to be 65%.  No significant valve abnormalities.      ECHOCARDIOGRAM CONCLUSIONS (07/20/17)  Normal left ventricular size and systolic function.   Normal diastolic function.   Mild concentric left ventricular hypertrophy.   Normal regional wall motion.   Left ventricular ejection fraction is visually estimated to be 60%.  Compared to the report of the study done 6/2014- there has been no   significant change     EKG performed on (03/22/22) EKG shows sinus rhythm.  EKG performed on (06/05/19) EKG shows sinus bradycardia.     Laboratory results of (01/31/23) Cholesterol profile of 135/153/47/57 mg/dL noted.   Laboratory results of (01/06/22) Cholesterol profile of 156/123/51/80 mg/dL noted.  Laboratory results of (01/06/21) Cholesterol profile of 151/109/49/80 mg/dL noted.  Laboratory results of (01/15/20) Cholesterol profile of 152/107/54/77 noted.   Laboratory results of (11/14/19) Cholesterol profile of 177/146/52/96 noted.     PET SCAN (05/22/19)  1.  No ischemic ST-T wave changes are noted.  2.  Large area of complete reversibility in the entire anterolateral wall,   concerning for ischemia.  3.  Normal left ventricular systolic function as discussed above.  4.  Transient ischemic dilatation is noted, concerning for 3-vessel disease.    Dr. Ana Muller was updated about the above findings at the time of the read.       Assessment & Plan     1. Coronary artery disease involving native coronary artery of native heart without angina pectoris         2. S/P CABG x 3        3. Primary hypertension        4. Hyperlipidemia, mixed            Medical Decision Making: Today's Assessment/Status/Plan:        Coronary artery disease with prior coronary bypass graft (x4 with left internal mammary to left anterior descending, reverse saphenous vein graft separately to the first obtuse marginal branch and the distal obtuse marginal branch and the right coronary artery just proximal to the varun by Mando Elliott 10/30/14): I will continue with current medical care including aspirin, metoprolol and atorvastatin. We will repeat an echocardiogram and myocardial perfusion imaging study.   Hypertension: Blood pressure is well controlled. We will continue with amlodipine.  Hyperlipidemia: She is doing well on statin therapy without myalgia symptoms.  Medication intolerance: She is intolerant to lisinopril due to cough.     We will follow up in 3 months.    Judy Brannon Ms.

## 2024-07-02 ENCOUNTER — TELEPHONE (OUTPATIENT)
Dept: CARDIOLOGY | Facility: MEDICAL CENTER | Age: 69
End: 2024-07-02
Payer: MEDICARE

## 2024-07-02 ENCOUNTER — HOSPITAL ENCOUNTER (OUTPATIENT)
Dept: LAB | Facility: MEDICAL CENTER | Age: 69
End: 2024-07-02
Attending: INTERNAL MEDICINE
Payer: MEDICARE

## 2024-07-02 DIAGNOSIS — E78.2 MIXED HYPERLIPIDEMIA: ICD-10-CM

## 2024-07-02 LAB
ALBUMIN SERPL BCP-MCNC: 4.2 G/DL (ref 3.2–4.9)
ALBUMIN/GLOB SERPL: 1.5 G/DL
ALP SERPL-CCNC: 127 U/L (ref 30–99)
ALT SERPL-CCNC: 23 U/L (ref 2–50)
ANION GAP SERPL CALC-SCNC: 13 MMOL/L (ref 7–16)
AST SERPL-CCNC: 20 U/L (ref 12–45)
BILIRUB SERPL-MCNC: 0.8 MG/DL (ref 0.1–1.5)
BUN SERPL-MCNC: 20 MG/DL (ref 8–22)
CALCIUM ALBUM COR SERPL-MCNC: 9.5 MG/DL (ref 8.5–10.5)
CALCIUM SERPL-MCNC: 9.7 MG/DL (ref 8.5–10.5)
CHLORIDE SERPL-SCNC: 106 MMOL/L (ref 96–112)
CHOLEST SERPL-MCNC: 131 MG/DL (ref 100–199)
CO2 SERPL-SCNC: 21 MMOL/L (ref 20–33)
CREAT SERPL-MCNC: 1.17 MG/DL (ref 0.5–1.4)
GFR SERPLBLD CREATININE-BSD FMLA CKD-EPI: 50 ML/MIN/1.73 M 2
GLOBULIN SER CALC-MCNC: 2.8 G/DL (ref 1.9–3.5)
GLUCOSE SERPL-MCNC: 96 MG/DL (ref 65–99)
HDLC SERPL-MCNC: 48 MG/DL
LDLC SERPL CALC-MCNC: 60 MG/DL
POTASSIUM SERPL-SCNC: 4 MMOL/L (ref 3.6–5.5)
PROT SERPL-MCNC: 7 G/DL (ref 6–8.2)
SODIUM SERPL-SCNC: 140 MMOL/L (ref 135–145)
TRIGL SERPL-MCNC: 117 MG/DL (ref 0–149)

## 2024-07-02 PROCEDURE — 36415 COLL VENOUS BLD VENIPUNCTURE: CPT

## 2024-07-02 PROCEDURE — 80053 COMPREHEN METABOLIC PANEL: CPT

## 2024-07-02 PROCEDURE — 80061 LIPID PANEL: CPT

## 2024-07-04 ENCOUNTER — HOSPITAL ENCOUNTER (OUTPATIENT)
Dept: CARDIOLOGY | Facility: MEDICAL CENTER | Age: 69
End: 2024-07-04
Attending: INTERNAL MEDICINE
Payer: MEDICARE

## 2024-07-04 ENCOUNTER — HOSPITAL ENCOUNTER (OUTPATIENT)
Dept: RADIOLOGY | Facility: MEDICAL CENTER | Age: 69
End: 2024-07-04
Attending: INTERNAL MEDICINE
Payer: MEDICARE

## 2024-07-04 DIAGNOSIS — Z95.1 S/P CABG X 3: ICD-10-CM

## 2024-07-04 DIAGNOSIS — I10 PRIMARY HYPERTENSION: ICD-10-CM

## 2024-07-04 DIAGNOSIS — I25.10 CORONARY ARTERY DISEASE INVOLVING NATIVE CORONARY ARTERY OF NATIVE HEART WITHOUT ANGINA PECTORIS: ICD-10-CM

## 2024-07-04 PROCEDURE — 700117 HCHG RX CONTRAST REV CODE 255: Performed by: INTERNAL MEDICINE

## 2024-07-04 PROCEDURE — A9502 TC99M TETROFOSMIN: HCPCS

## 2024-07-04 PROCEDURE — 93306 TTE W/DOPPLER COMPLETE: CPT

## 2024-07-04 RX ADMIN — HUMAN ALBUMIN MICROSPHERES AND PERFLUTREN 3 ML: 10; .22 INJECTION, SOLUTION INTRAVENOUS at 13:00

## 2024-07-05 LAB
LV EJECT FRACT  99904: 70
LV EJECT FRACT MOD 2C 99903: 77.78
LV EJECT FRACT MOD 4C 99902: 65.92
LV EJECT FRACT MOD BP 99901: 72.1

## 2024-07-05 PROCEDURE — 78452 HT MUSCLE IMAGE SPECT MULT: CPT | Mod: 26 | Performed by: INTERNAL MEDICINE

## 2024-07-05 PROCEDURE — 93306 TTE W/DOPPLER COMPLETE: CPT | Mod: 26 | Performed by: INTERNAL MEDICINE

## 2024-07-05 PROCEDURE — 93018 CV STRESS TEST I&R ONLY: CPT | Performed by: INTERNAL MEDICINE

## 2024-07-31 ENCOUNTER — OFFICE VISIT (OUTPATIENT)
Dept: CARDIOLOGY | Facility: MEDICAL CENTER | Age: 69
End: 2024-07-31
Attending: INTERNAL MEDICINE
Payer: MEDICARE

## 2024-07-31 VITALS
DIASTOLIC BLOOD PRESSURE: 60 MMHG | BODY MASS INDEX: 28.22 KG/M2 | RESPIRATION RATE: 16 BRPM | HEART RATE: 55 BPM | HEIGHT: 59 IN | OXYGEN SATURATION: 96 % | WEIGHT: 140 LBS | SYSTOLIC BLOOD PRESSURE: 118 MMHG

## 2024-07-31 DIAGNOSIS — E78.2 MIXED HYPERLIPIDEMIA: ICD-10-CM

## 2024-07-31 DIAGNOSIS — I10 PRIMARY HYPERTENSION: ICD-10-CM

## 2024-07-31 DIAGNOSIS — Z95.1 S/P CABG X 3: ICD-10-CM

## 2024-07-31 DIAGNOSIS — I25.10 CORONARY ARTERY DISEASE INVOLVING NATIVE CORONARY ARTERY OF NATIVE HEART WITHOUT ANGINA PECTORIS: ICD-10-CM

## 2024-07-31 PROCEDURE — 99212 OFFICE O/P EST SF 10 MIN: CPT | Performed by: INTERNAL MEDICINE

## 2024-07-31 ASSESSMENT — ENCOUNTER SYMPTOMS
BLURRED VISION: 0
NAUSEA: 0
GASTROINTESTINAL NEGATIVE: 1
BRUISES/BLEEDS EASILY: 0
SHORTNESS OF BREATH: 0
EYES NEGATIVE: 1
ABDOMINAL PAIN: 0
NERVOUS/ANXIOUS: 0
VOMITING: 0
DOUBLE VISION: 0
PSYCHIATRIC NEGATIVE: 1
WEIGHT LOSS: 0
CHILLS: 0
MYALGIAS: 0
WEAKNESS: 0
CONSTITUTIONAL NEGATIVE: 1
FOCAL WEAKNESS: 0
HEADACHES: 0
DIZZINESS: 0
NEUROLOGICAL NEGATIVE: 1
CARDIOVASCULAR NEGATIVE: 1
COUGH: 0
CLAUDICATION: 0
MUSCULOSKELETAL NEGATIVE: 1
FEVER: 0
RESPIRATORY NEGATIVE: 1
DEPRESSION: 0
PALPITATIONS: 0

## 2024-07-31 ASSESSMENT — FIBROSIS 4 INDEX: FIB4 SCORE: 1.72

## 2024-08-07 ENCOUNTER — APPOINTMENT (OUTPATIENT)
Dept: MEDICAL GROUP | Facility: PHYSICIAN GROUP | Age: 69
End: 2024-08-07
Payer: MEDICARE

## 2024-08-07 ENCOUNTER — HOSPITAL ENCOUNTER (OUTPATIENT)
Dept: LAB | Facility: MEDICAL CENTER | Age: 69
End: 2024-08-07
Payer: MEDICARE

## 2024-08-07 VITALS
SYSTOLIC BLOOD PRESSURE: 122 MMHG | DIASTOLIC BLOOD PRESSURE: 72 MMHG | HEIGHT: 59 IN | WEIGHT: 140 LBS | BODY MASS INDEX: 28.22 KG/M2 | RESPIRATION RATE: 14 BRPM | OXYGEN SATURATION: 96 % | HEART RATE: 64 BPM | TEMPERATURE: 97.4 F

## 2024-08-07 DIAGNOSIS — Z12.31 ENCOUNTER FOR SCREENING MAMMOGRAM FOR BREAST CANCER: ICD-10-CM

## 2024-08-07 DIAGNOSIS — R74.8 ELEVATED ALKALINE PHOSPHATASE LEVEL: ICD-10-CM

## 2024-08-07 DIAGNOSIS — E03.9 HYPOTHYROIDISM, UNSPECIFIED TYPE: ICD-10-CM

## 2024-08-07 DIAGNOSIS — I10 PRIMARY HYPERTENSION: ICD-10-CM

## 2024-08-07 DIAGNOSIS — R73.03 PREDIABETES: ICD-10-CM

## 2024-08-07 DIAGNOSIS — R73.09 ELEVATED HEMOGLOBIN A1C: ICD-10-CM

## 2024-08-07 DIAGNOSIS — N18.31 STAGE 3A CHRONIC KIDNEY DISEASE: ICD-10-CM

## 2024-08-07 DIAGNOSIS — E78.2 MIXED HYPERLIPIDEMIA: ICD-10-CM

## 2024-08-07 DIAGNOSIS — Z12.11 SCREENING FOR COLORECTAL CANCER: ICD-10-CM

## 2024-08-07 DIAGNOSIS — N95.1 MENOPAUSAL STATE: ICD-10-CM

## 2024-08-07 DIAGNOSIS — Z12.12 SCREENING FOR COLORECTAL CANCER: ICD-10-CM

## 2024-08-07 DIAGNOSIS — Z78.0 POSTMENOPAUSAL STATUS (AGE-RELATED) (NATURAL): ICD-10-CM

## 2024-08-07 DIAGNOSIS — I25.10 CORONARY ARTERY DISEASE INVOLVING NATIVE CORONARY ARTERY OF NATIVE HEART WITHOUT ANGINA PECTORIS: ICD-10-CM

## 2024-08-07 LAB
EST. AVERAGE GLUCOSE BLD GHB EST-MCNC: 120 MG/DL
HBA1C MFR BLD: 5.8 % (ref 4–5.6)
TSH SERPL-ACNC: 5.83 UIU/ML (ref 0.35–5.5)

## 2024-08-07 PROCEDURE — 84443 ASSAY THYROID STIM HORMONE: CPT

## 2024-08-07 PROCEDURE — 36415 COLL VENOUS BLD VENIPUNCTURE: CPT

## 2024-08-07 PROCEDURE — 99214 OFFICE O/P EST MOD 30 MIN: CPT

## 2024-08-07 PROCEDURE — 3074F SYST BP LT 130 MM HG: CPT

## 2024-08-07 PROCEDURE — 3078F DIAST BP <80 MM HG: CPT

## 2024-08-07 PROCEDURE — 83036 HEMOGLOBIN GLYCOSYLATED A1C: CPT

## 2024-08-07 PROCEDURE — 83915 ASSAY OF NUCLEOTIDASE: CPT

## 2024-08-07 ASSESSMENT — FIBROSIS 4 INDEX: FIB4 SCORE: 1.72

## 2024-08-07 NOTE — ASSESSMENT & PLAN NOTE
Chronic, stable. Continue levothyroxine 88 mcg daily, recheck tsh annually. Discussed increasing to 2 tabs once weekly to get tsh closer to 1.

## 2024-08-07 NOTE — ASSESSMENT & PLAN NOTE
Chronic, stable. Bp in clinic today 122/72. Continue amlodipine 5 mg daily, metoprolol 12.5 mg BID

## 2024-08-07 NOTE — PROGRESS NOTES
"Subjective:     CC: Diagnoses of Coronary artery disease involving native coronary artery of native heart without angina pectoris, Encounter for screening mammogram for breast cancer, Postmenopausal status (age-related) (natural), Menopausal state, Screening for colorectal cancer, Primary hypertension, Hyperlipidemia, mixed, Elevated alkaline phosphatase level, Elevated hemoglobin A1c, Stage 3a chronic kidney disease, Hypothyroidism, unspecified type, and Prediabetes were pertinent to this visit.    Pt presents today for follow up. She is feeling well today.    HPI:   Malena presents today with    Problem   Prediabetes   Elevated Alkaline Phosphatase Level   Hypothyroidism   Stage 3a Chronic Kidney Disease   Hyperlipidemia, mixed    IMO load March 2020     Coronary Artery Disease Involving Native Coronary Artery Without Angina Pectoris    S/p CABG 2014     Hypertension     ROS:  Review of Systems   All other systems reviewed and are negative.    Objective:     Exam:  /72 (BP Location: Left arm, Patient Position: Sitting, BP Cuff Size: Adult)   Pulse 64   Temp 36.3 °C (97.4 °F) (Temporal)   Resp 14   Ht 1.499 m (4' 11\")   Wt 63.5 kg (140 lb)   SpO2 96%   BMI 28.28 kg/m²  Body mass index is 28.28 kg/m².    Physical Exam  Vitals reviewed.   Constitutional:       General: She is not in acute distress.     Appearance: Normal appearance. She is not ill-appearing.   HENT:      Head: Normocephalic and atraumatic.   Cardiovascular:      Rate and Rhythm: Normal rate.      Pulses: Normal pulses.   Pulmonary:      Effort: Pulmonary effort is normal. No respiratory distress.   Skin:     General: Skin is warm and dry.      Findings: No rash.   Neurological:      General: No focal deficit present.      Mental Status: She is alert and oriented to person, place, and time.   Psychiatric:         Mood and Affect: Mood normal.         Behavior: Behavior normal.         Labs:    Latest Reference Range & Units 01/23/24 08:30 " 07/02/24 08:22 07/04/24 12:05 07/04/24 12:41   WBC 4.8 - 10.8 K/uL 5.9      RBC 4.20 - 5.40 M/uL 5.24      Hemoglobin 12.0 - 16.0 g/dL 15.9      Hematocrit 37.0 - 47.0 % 48.5 (H)      MCV 81.4 - 97.8 fL 92.6      MCH 27.0 - 33.0 pg 30.3      MCHC 32.2 - 35.5 g/dL 32.8      RDW 35.9 - 50.0 fL 42.4      Platelet Count 164 - 446 K/uL 170      MPV 9.0 - 12.9 fL 14.3 (H)      Sodium 135 - 145 mmol/L 141 140     Potassium 3.6 - 5.5 mmol/L 3.8 4.0     Chloride 96 - 112 mmol/L 104 106     Co2 20 - 33 mmol/L 22 21     Anion Gap 7.0 - 16.0  15.0 13.0     Glucose 65 - 99 mg/dL 90 96     Bun 8 - 22 mg/dL 22 20     Creatinine 0.50 - 1.40 mg/dL 1.16 1.17     GFR (CKD-EPI) >60 mL/min/1.73 m 2 51 ! 50 !     Calcium 8.5 - 10.5 mg/dL 9.4 9.7     Correct Calcium 8.5 - 10.5 mg/dL 8.9 9.5     AST(SGOT) 12 - 45 U/L 22 20     ALT(SGPT) 2 - 50 U/L 21 23     Alkaline Phosphatase 30 - 99 U/L 121 (H) 127 (H)     Total Bilirubin 0.1 - 1.5 mg/dL 0.7 0.8     Albumin 3.2 - 4.9 g/dL 4.6 4.2     Total Protein 6.0 - 8.2 g/dL 7.7 7.0     Globulin 1.9 - 3.5 g/dL 3.1 2.8     A-G Ratio g/dL 1.5 1.5     Glycohemoglobin 4.0 - 5.6 % 5.8 (H)      Estim. Avg Glu mg/dL 120      Cholesterol,Tot 100 - 199 mg/dL 151 131     Triglycerides 0 - 149 mg/dL 119 117     HDL >=40 mg/dL 54 48     LDL <100 mg/dL 73 60     25-Hydroxy   Vitamin D 25 30 - 100 ng/mL 86      TSH 0.380 - 5.330 uIU/mL 4.420      NM-CARDIAC STRESS TEST    Rpt    Left Ventrical Ejection Fraction     70   EC-ECHOCARDIOGRAM COMPLETE W/ CONT     Rpt   (H): Data is abnormally high  !: Data is abnormal  Rpt: View report in Results Review for more information    Assessment & Plan:     69 y.o. female with the following -     Problem List Items Addressed This Visit       Hypertension     Chronic, stable. Bp in clinic today 122/72. Continue amlodipine 5 mg daily, metoprolol 12.5 mg BID         Coronary artery disease involving native coronary artery without angina pectoris     Chronic, ongoing. Seeing  cardiology for this. Had echo/stress test 7/4/24         Hyperlipidemia, mixed     Chronic, stable. Cotninue asa 81 mg daily, atorvastatin 80 mg dialy.         Hypothyroidism     Chronic, stable. Continue levothyroxine 88 mcg daily, recheck tsh annually. Discussed increasing to 2 tabs once weekly to get tsh closer to 1.          Relevant Orders    TSH    Stage 3a chronic kidney disease     Stable  No uremic symptoms  Renal dose of medication  Avoid nephrotoxins  Continue same medication regimen  Recheck labs in 3-6 months           Elevated alkaline phosphatase level     Chronic, stable. Isoenzymes checked 3/31/22, WNL. Consider US liver for further evaluation.         Relevant Orders    5' NUCLEOTIDASE    Prediabetes     Chronic, stable. Will recheck A1c q 6 months          Other Visit Diagnoses       Encounter for screening mammogram for breast cancer        Relevant Orders    MA-SCREENING MAMMO BILAT W/TOMOSYNTHESIS W/CAD    Postmenopausal status (age-related) (natural)        Relevant Orders    DS-BONE DENSITY STUDY (DEXA)    Menopausal state        Relevant Orders    DS-BONE DENSITY STUDY (DEXA)    Screening for colorectal cancer        Relevant Orders    COLOGUARD (FIT DNA)    Elevated hemoglobin A1c        Relevant Orders    HEMOGLOBIN A1C          Patient was educated in proper administration of medication(s) ordered today including safety, possible SE, risks, benefits, rationale and alternatives to therapy.   Supportive care, differential diagnoses, and indications for immediate follow-up discussed with patient.    Pathogenesis of diagnosis discussed including typical length and natural progression.    Instructed to return to clinic or nearest emergency department for any change in condition, further concerns, or worsening of symptoms.  Patient states understanding of the plan of care and discharge instructions.    Return in about 6 months (around 2/7/2025), or if symptoms worsen or fail to  improve.    Please note that this dictation was created using voice recognition software. I have made every reasonable attempt to correct obvious errors, but I expect that there are errors of grammar and possibly content that I did not discover before finalizing the note.

## 2024-08-09 LAB — 5NT SERPL-CCNC: 14 U/L (ref 0–15)

## 2024-08-12 DIAGNOSIS — E03.9 HYPOTHYROIDISM, UNSPECIFIED TYPE: ICD-10-CM

## 2024-08-26 ENCOUNTER — HOSPITAL ENCOUNTER (OUTPATIENT)
Dept: RADIOLOGY | Facility: MEDICAL CENTER | Age: 69
End: 2024-08-26
Payer: MEDICARE

## 2024-08-26 DIAGNOSIS — Z78.0 POSTMENOPAUSAL STATUS (AGE-RELATED) (NATURAL): ICD-10-CM

## 2024-08-26 DIAGNOSIS — Z12.31 ENCOUNTER FOR SCREENING MAMMOGRAM FOR BREAST CANCER: ICD-10-CM

## 2024-08-26 DIAGNOSIS — N95.1 MENOPAUSAL STATE: ICD-10-CM

## 2024-08-26 PROCEDURE — 77080 DXA BONE DENSITY AXIAL: CPT

## 2024-08-26 PROCEDURE — 77067 SCR MAMMO BI INCL CAD: CPT

## 2024-08-29 ENCOUNTER — OFFICE VISIT (OUTPATIENT)
Dept: MEDICAL GROUP | Facility: PHYSICIAN GROUP | Age: 69
End: 2024-08-29
Payer: MEDICARE

## 2024-08-29 VITALS
DIASTOLIC BLOOD PRESSURE: 82 MMHG | OXYGEN SATURATION: 95 % | SYSTOLIC BLOOD PRESSURE: 112 MMHG | RESPIRATION RATE: 18 BRPM | BODY MASS INDEX: 28.02 KG/M2 | WEIGHT: 139 LBS | TEMPERATURE: 98.2 F | HEART RATE: 60 BPM | HEIGHT: 59 IN

## 2024-08-29 DIAGNOSIS — E03.9 HYPOTHYROIDISM, UNSPECIFIED TYPE: ICD-10-CM

## 2024-08-29 DIAGNOSIS — I10 PRIMARY HYPERTENSION: ICD-10-CM

## 2024-08-29 DIAGNOSIS — R73.03 PREDIABETES: ICD-10-CM

## 2024-08-29 DIAGNOSIS — M81.0 AGE-RELATED OSTEOPOROSIS WITHOUT CURRENT PATHOLOGICAL FRACTURE: ICD-10-CM

## 2024-08-29 ASSESSMENT — FIBROSIS 4 INDEX: FIB4 SCORE: 1.72

## 2024-08-29 NOTE — PATIENT INSTRUCTIONS
"What is osteoporosis?   This is a disease that makes bones weak. People with osteoporosis can break their bones too easily. For example, people with osteoporosis sometimes break a bone after falling down at home.  Breaking a bone can be serious, especially if the bone is in the hip. People who break a hip sometimes lose the ability to walk on their own. Many of them need care in a nursing home. That's why it is so important to avoid breaking a bone in the first place.  What is osteopenia?   This is another word for low bone mineral density. Doctors also call this \"low bone mass.\" People with low bone mass generally have a lower risk of breaking a bone than people with osteoporosis. But their bone mineral density is below normal.  How do I know if I have osteoporosis or low bone mass?   Osteoporosis does not cause symptoms until you break a bone. But your doctor or nurse can have you tested for it. The best test is a bone mineral density test called the \"DXA test.\" It is a special kind of X-ray.  Experts recommend DXA testing for females older than 65. That is because people in this group have the highest risk of osteoporosis. Still, other people should sometimes be tested, too. Ask your doctor or nurse if you should be tested.  Some people learn that they have osteoporosis because they break a bone during a fall or a mild impact. This is called a \"fragility fracture,\" because people with healthy bones should not break a bone that easily. People who have fragility fractures are at high risk of having other bones break.  How do I keep my bones as healthy as possible?   You can:  ?Eat foods with a lot of calcium, such as milk, yogurt, and green leafy vegetables (table 1 and figure 1).  ?Eat foods with a lot of vitamin D, such as milk that has vitamin D added, and fish from the ocean.  ?Take calcium and vitamin D pills (if you do not get enough from the food that you eat).  ?Be active for at least 30 minutes, most days " of the week.  ?Avoid smoking.  ?Limit the amount of alcohol you drink to 1 to 2 drinks a day at most.  If you have low bone mass but not osteoporosis, making these changes is often the only treatment you need.  How else can I avoid fractures?   It sounds simple, but you can prevent a lot of fractures by reducing the chances of a fall. To do that:  ?Make sure that all of your rugs have a no-slip backing to keep them in place.  ?Tuck away any electrical cords, so they are not in your way.  ?Light all walkways well.  ?Watch out for slippery floors.  ?Wear sturdy, comfortable shoes with rubber soles.  ?Have your eyes checked.  ?Ask your doctor or nurse to check whether any of your medicines might make you dizzy or increase your risk of falling.  Can osteoporosis be treated?   Yes, there are a few medicines to treat osteoporosis. These medicines can reduce the chances that you will break a bone.  Doctors and nurses usually suggest trying medicines called bisphosphonates first. If these do not do enough or if they cause side effects that bother you, you can try other medicines.  In some cases, doctors also recommend medicine for people with osteopenia (low bone mass). But this depends on how likely the person is to break a bone. Your doctor can talk with you about whether medicine is an option.  How can I tell if the treatment is working?   Doctors and nurses often order DXA tests to check if osteoporosis medicines are working. These are the same tests they use to find osteoporosis in the first place. Sometimes, a blood or urine test is also needed.

## 2024-08-29 NOTE — ASSESSMENT & PLAN NOTE
Chronic, stable. Discussed healthy lifestyle recommendations. Reports she enjoys candy. Plan to recheck 6 months.

## 2024-08-29 NOTE — PROGRESS NOTES
"Subjective:     CC: Diagnoses of Age-related osteoporosis without current pathological fracture, Hypothyroidism, unspecified type, Prediabetes, and Primary hypertension were pertinent to this visit.    HPI:   Malena presents today with    Problem   Prediabetes   Hypothyroidism   Age-Related Osteoporosis Without Current Pathological Fracture   Hypertension     ROS:  Review of Systems   All other systems reviewed and are negative.      Objective:     Exam:  /82 (BP Location: Right arm, Patient Position: Sitting, BP Cuff Size: Adult)   Pulse 60   Temp 36.8 °C (98.2 °F) (Temporal)   Resp 18   Ht 1.499 m (4' 11\")   Wt 63 kg (139 lb)   SpO2 95%   BMI 28.07 kg/m²  Body mass index is 28.07 kg/m².    Physical Exam  Vitals reviewed.   Constitutional:       General: She is not in acute distress.     Appearance: Normal appearance. She is not ill-appearing.   HENT:      Head: Normocephalic and atraumatic.   Cardiovascular:      Rate and Rhythm: Normal rate.      Pulses: Normal pulses.   Pulmonary:      Effort: Pulmonary effort is normal. No respiratory distress.   Skin:     General: Skin is warm and dry.      Findings: No rash.   Neurological:      General: No focal deficit present.      Mental Status: She is alert and oriented to person, place, and time.   Psychiatric:         Mood and Affect: Mood normal.         Behavior: Behavior normal.         Labs: reviewed cologuard (negative) result 8/28/24.      Latest Reference Range & Units 08/07/24 14:26 08/20/24 08:48 08/26/24 15:21 08/26/24 15:35   5' Nucleotidase, Serum 0 - 15 U/L 14      Glycohemoglobin 4.0 - 5.6 % 5.8 (H)      Estim. Avg Glu mg/dL 120      Noninvasive Colorectal Cancer DNA and Occult Blood Screening Negative   Negative (E)     TSH 0.350 - 5.500 uIU/mL 5.830 (H)      DS-BONE DENSITY STUDY (DEXA)    Rpt    MA-SCREENING MAMMO BILAT W/TOMOSYNTHESIS W/CAD     Rpt   (H): Data is abnormally high  (E): External lab result  Rpt: View report in Results " Review for more information  Assessment & Plan:     69 y.o. female with the following -     Assessment & Plan  Age-related osteoporosis without current pathological fracture  8/26/24 dexaFINDINGS:  The lumbar spine has a mean bone mineral density of 0.886 g/cm2, with a T score of -2.4 and a Z score of -0.7.   The proximal left femur has a mean bone mineral density of 0.817 g/cm2, with a T score of -1.5 and a Z score of 0.0.   When compared with the most recent study dated 8/3/2022, there has been a 2.6% decrease in the bone mineral density of the proximal left femur and when compared to the study dated 9/21/2012 there has been a 6.0% increase in the bone mineral density of   the lumbar spine.   IMPRESSION:   According to the World Health Organization classification, bone mineral density of this patient is osteopenic in the spine and osteopenic in the proximal left femur.   10-year Probability of Fracture:  Major Osteoporotic     15.5%  Hip     4.2%  Population      [place] ()    Discussed recommendations based on guidelines to treat osteopenia, Pt would like to continue exercise, vit d and calcium. Consider bisphosphonate in the future.          Hypothyroidism, unspecified type  Chronic, unstable. Discussed increasing levothyroxine to 2 tabs once weekly, 1 tab daily 6 days weekly.  Will plan to recheck tsh after 8-10 weeks of increased dose.    Denies hair loss, constipation, dry skin.         Prediabetes  Chronic, stable. Discussed healthy lifestyle recommendations. Reports she enjoys candy. Plan to recheck 6 months.            Primary hypertension  Chronic, stable. Bp in clinic today 112/82. Continue amlodipine 5 mg dialy, metoprolol 12.5 bid.              Patient was educated in proper administration of medication(s) ordered today including safety, possible SE, risks, benefits, rationale and alternatives to therapy.   Supportive care, differential diagnoses, and indications for immediate follow-up discussed  with patient.    Pathogenesis of diagnosis discussed including typical length and natural progression.    Instructed to return to clinic or nearest emergency department for any change in condition, further concerns, or worsening of symptoms.  Patient states understanding of the plan of care and discharge instructions.    Return in about 6 months (around 2/28/2025), or if symptoms worsen or fail to improve, for wellness.    Please note that this dictation was created using voice recognition software. I have made every reasonable attempt to correct obvious errors, but I expect that there are errors of grammar and possibly content that I did not discover before finalizing the note.

## 2024-08-29 NOTE — ASSESSMENT & PLAN NOTE
Chronic, unstable. Discussed increasing levothyroxine to 2 tabs once weekly, 1 tab daily 6 days weekly.  Will plan to recheck tsh after 8-10 weeks of increased dose.    Denies hair loss, constipation, dry skin.

## 2024-08-29 NOTE — ASSESSMENT & PLAN NOTE
Chronic, stable. Bp in clinic today 112/82. Continue amlodipine 5 mg dialy, metoprolol 12.5 bid.

## 2024-08-29 NOTE — ASSESSMENT & PLAN NOTE
8/26/24 dexaFINDINGS:  The lumbar spine has a mean bone mineral density of 0.886 g/cm2, with a T score of -2.4 and a Z score of -0.7.   The proximal left femur has a mean bone mineral density of 0.817 g/cm2, with a T score of -1.5 and a Z score of 0.0.   When compared with the most recent study dated 8/3/2022, there has been a 2.6% decrease in the bone mineral density of the proximal left femur and when compared to the study dated 9/21/2012 there has been a 6.0% increase in the bone mineral density of   the lumbar spine.   IMPRESSION:   According to the World Health Organization classification, bone mineral density of this patient is osteopenic in the spine and osteopenic in the proximal left femur.   10-year Probability of Fracture:  Major Osteoporotic     15.5%  Hip     4.2%  Population      [place] ()    Discussed recommendations based on guidelines to treat osteopenia, Pt would like to continue exercise, vit d and calcium. Consider bisphosphonate in the future.

## 2024-11-07 DIAGNOSIS — I10 PRIMARY HYPERTENSION: ICD-10-CM

## 2024-11-07 NOTE — TELEPHONE ENCOUNTER
Received request via: Pharmacy    Was the patient seen in the last year in this department? Yes    Does the patient have an active prescription (recently filled or refills available) for medication(s) requested? No    Pharmacy Name: [facility]     Does the patient have long-term Plus and need 100-day supply? (This applies to ALL medications) Yes, quantity updated to 100 days

## 2024-11-11 RX ORDER — AMLODIPINE BESYLATE 5 MG/1
5 TABLET ORAL DAILY
Qty: 100 TABLET | Refills: 3 | Status: SHIPPED | OUTPATIENT
Start: 2024-11-11

## 2024-12-09 ENCOUNTER — HOSPITAL ENCOUNTER (OUTPATIENT)
Dept: LAB | Facility: MEDICAL CENTER | Age: 69
End: 2024-12-09
Payer: MEDICARE

## 2024-12-09 DIAGNOSIS — E03.9 HYPOTHYROIDISM, UNSPECIFIED TYPE: ICD-10-CM

## 2024-12-09 PROCEDURE — 36415 COLL VENOUS BLD VENIPUNCTURE: CPT

## 2024-12-09 PROCEDURE — 84443 ASSAY THYROID STIM HORMONE: CPT

## 2024-12-10 LAB — TSH SERPL-ACNC: 0.69 UIU/ML (ref 0.35–5.5)

## 2025-01-02 DIAGNOSIS — E03.9 HYPOTHYROIDISM, UNSPECIFIED TYPE: ICD-10-CM

## 2025-01-02 RX ORDER — LEVOTHYROXINE SODIUM 88 UG/1
88 TABLET ORAL
Qty: 100 TABLET | Refills: 3 | Status: SHIPPED | OUTPATIENT
Start: 2025-01-02

## 2025-01-02 NOTE — TELEPHONE ENCOUNTER
Received request via: Pharmacy    Was the patient seen in the last year in this department? Yes    Does the patient have an active prescription (recently filled or refills available) for medication(s) requested? No    Pharmacy Name: [facility]    Does the patient have [facility] Plus and need 100-day supply? (This applies to ALL medications) Yes, quantity updated to 100 days

## 2025-02-05 ENCOUNTER — OFFICE VISIT (OUTPATIENT)
Dept: MEDICAL GROUP | Facility: PHYSICIAN GROUP | Age: 70
End: 2025-02-05
Payer: MEDICARE

## 2025-02-05 VITALS
WEIGHT: 138 LBS | SYSTOLIC BLOOD PRESSURE: 118 MMHG | DIASTOLIC BLOOD PRESSURE: 62 MMHG | TEMPERATURE: 97.5 F | HEIGHT: 59 IN | OXYGEN SATURATION: 98 % | BODY MASS INDEX: 27.82 KG/M2 | HEART RATE: 62 BPM | RESPIRATION RATE: 16 BRPM

## 2025-02-05 DIAGNOSIS — Z86.19 HISTORY OF HERPES GENITALIS: ICD-10-CM

## 2025-02-05 DIAGNOSIS — E03.9 HYPOTHYROIDISM, UNSPECIFIED TYPE: ICD-10-CM

## 2025-02-05 DIAGNOSIS — I25.10 CORONARY ARTERY DISEASE INVOLVING NATIVE CORONARY ARTERY OF NATIVE HEART WITHOUT ANGINA PECTORIS: ICD-10-CM

## 2025-02-05 DIAGNOSIS — E78.2 MIXED HYPERLIPIDEMIA: ICD-10-CM

## 2025-02-05 DIAGNOSIS — R73.09 ELEVATED HEMOGLOBIN A1C: ICD-10-CM

## 2025-02-05 DIAGNOSIS — Z95.1 S/P CABG X 3: ICD-10-CM

## 2025-02-05 DIAGNOSIS — I10 ESSENTIAL HYPERTENSION, BENIGN: ICD-10-CM

## 2025-02-05 DIAGNOSIS — I10 PRIMARY HYPERTENSION: ICD-10-CM

## 2025-02-05 DIAGNOSIS — N18.31 STAGE 3A CHRONIC KIDNEY DISEASE: ICD-10-CM

## 2025-02-05 PROCEDURE — 3074F SYST BP LT 130 MM HG: CPT

## 2025-02-05 PROCEDURE — 3078F DIAST BP <80 MM HG: CPT

## 2025-02-05 PROCEDURE — 99397 PER PM REEVAL EST PAT 65+ YR: CPT

## 2025-02-05 RX ORDER — ATORVASTATIN CALCIUM 80 MG/1
80 TABLET, FILM COATED ORAL NIGHTLY
Qty: 100 TABLET | Refills: 3 | Status: SHIPPED | OUTPATIENT
Start: 2025-02-05

## 2025-02-05 RX ORDER — METOPROLOL TARTRATE 25 MG/1
12.5 TABLET, FILM COATED ORAL 2 TIMES DAILY
Qty: 100 TABLET | Refills: 3 | Status: SHIPPED | OUTPATIENT
Start: 2025-02-05

## 2025-02-05 RX ORDER — ACYCLOVIR 800 MG/1
800 TABLET ORAL 3 TIMES DAILY
Qty: 30 TABLET | Refills: 1 | Status: SHIPPED | OUTPATIENT
Start: 2025-02-05

## 2025-02-05 RX ORDER — NITROGLYCERIN 0.4 MG/1
0.4 TABLET SUBLINGUAL PRN
Qty: 25 TABLET | Refills: 3 | Status: SHIPPED | OUTPATIENT
Start: 2025-02-05

## 2025-02-05 ASSESSMENT — PATIENT HEALTH QUESTIONNAIRE - PHQ9: CLINICAL INTERPRETATION OF PHQ2 SCORE: 0

## 2025-02-05 ASSESSMENT — FIBROSIS 4 INDEX: FIB4 SCORE: 1.72

## 2025-02-05 NOTE — PROGRESS NOTES
Verbal consent was acquired by the patient to use [employer] ambient listening note generation during this visit     Subjective:     CC: Diagnoses of History of herpes genitalis, Hyperlipidemia, mixed, Essential hypertension, benign, S/P CABG x 3, Coronary artery disease involving native coronary artery of native heart without angina pectoris, Hypothyroidism, unspecified type, Primary hypertension, Stage 3a chronic kidney disease, and Elevated hemoglobin A1c were pertinent to this visit.    HPI:   Malena presents today with    History of Present Illness  The patient presents for a yearly checkup.    She reports no alterations in her medication regimen but requires refills for the upcoming year. She procures aspirin over the counter and is on atorvastatin, Allegra, B12, Synthroid, metoprolol, nitroglycerin, and vitamin D. She underwent a TSH test in December 2024, which indicated improved levels. She has been adhering to a modified dosage of Synthroid, taking two tablets on Thursdays and one tablet on the remaining six days of the week, which she reports as effective. She does not monitor her blood pressure at home. She has a persistent cyst on her skin, which has been progressively enlarging. The cyst is non-tender to touch. It previously became infected, drained spontaneously, and subsequently closed up. She reports no recent leg swelling, shortness of breath, palpitations, abdominal pain, urinary issues, or irregular bowel movements. She also reports no changes in hearing, difficulty swallowing, or presence of swollen lymph nodes. She maintains a diet rich in vegetables, with occasional consumption of hamburgers and soda. She engages in physical activity by walking her dogs three times daily. She has been in a relationship with her boyfriend for 13 years.    SOCIAL HISTORY  She does not smoke.    ALLERGIES  She has an allergy to ACE INHIBITORS, specifically LISINOPRIL, which caused a  cough.    MEDICATIONS  Aspirin, atorvastatin, amlodipine, Allegra, B12, levothyroxine, metoprolol, vitamin D.    IMMUNIZATIONS  She declines the influenza vaccine. She has had the old shingles vaccine and half of the new one.    Current Outpatient Medications   Medication Sig Dispense Refill    acyclovir (ZOVIRAX) 800 MG Tab Take 1 Tablet by mouth 3 times a day. For 2 days as needed for break outs 30 Tablet 1    atorvastatin (LIPITOR) 80 MG tablet Take 1 Tablet by mouth every evening. 100 Tablet 3    metoprolol tartrate (LOPRESSOR) 25 MG Tab Take 0.5 Tablets by mouth 2 times a day. 100 Tablet 3    nitroglycerin (NITROSTAT) 0.4 MG SL Tab Place 1 Tablet under the tongue as needed for Chest Pain. 25 Tablet 3    levothyroxine (SYNTHROID) 88 MCG Tab TAKE 1 TABLET BY MOUTH IN THE MORNING ON AN EMPTY STOMACH 100 Tablet 3    amLODIPine (NORVASC) 5 MG Tab Take 1 tablet by mouth once daily 100 Tablet 3    Cyanocobalamin (VITAMIN B-12 PO) Take  by mouth every day.      Chlorpheniramine Maleate (ALLERGY 4 HOUR PO) Take  by mouth as needed.      vitamin D (CHOLECALCIFEROL) 1000 UNIT Tab Take 1 Tablet by mouth every day.      aspirin 81 MG tablet Take 1 Tablet by mouth every day.       No current facility-administered medications for this visit.       Problem   Hypothyroidism   Hyperlipidemia, mixed   Hypertension       Health Maintenance: Completed  Cancer screening:   Colorectal cancer screening:  susan negative 2024    Infectious disease screening/Immunizaitons  -STI screening: N/A  Practices safe sex.    -Immunizaitons:   Influenza: Offered declined  Tetanus: up-to-date: 4/8/2025 due  Anticipatory Guidance:  Elicits she is eating a variety of fresh veggies/fruits, lean meats,   limited fast food/junk food/soda  Exercise: recommended 150 minutes/week of exercise reports walking her dog 3 times a day  Substance Abuse: No  Safe in relationship.  Yes  Seat belts, bike helmet, gun safety discussed.  Sun protection  "used.        ROS:  Review of Systems   Skin:         Cyst to Left lower back, non tender, chronic   All other systems reviewed and are negative.      Objective:     Exam:  /62 (BP Location: Left arm, Patient Position: Sitting, BP Cuff Size: Adult)   Pulse 62   Temp 36.4 °C (97.5 °F) (Temporal)   Resp 16   Ht 1.499 m (4' 11\")   Wt 62.6 kg (138 lb)   SpO2 98%   BMI 27.87 kg/m²  Body mass index is 27.87 kg/m².    Physical Exam  Vitals reviewed.   Constitutional:       General: She is not in acute distress.     Appearance: Normal appearance. She is well-groomed. She is not ill-appearing.   HENT:      Head: Normocephalic and atraumatic.      Right Ear: Tympanic membrane, ear canal and external ear normal.      Left Ear: Tympanic membrane, ear canal and external ear normal.      Nose: Nose normal.      Mouth/Throat:      Mouth: Mucous membranes are moist.      Pharynx: Oropharynx is clear.   Eyes:      Extraocular Movements: Extraocular movements intact.      Conjunctiva/sclera: Conjunctivae normal.      Pupils: Pupils are equal, round, and reactive to light.   Neck:      Thyroid: No thyromegaly.      Trachea: Trachea normal.   Cardiovascular:      Rate and Rhythm: Normal rate and regular rhythm.      Pulses: Normal pulses.      Heart sounds: Normal heart sounds. No murmur heard.  Pulmonary:      Effort: Pulmonary effort is normal. No respiratory distress.      Breath sounds: Normal breath sounds. No wheezing.   Abdominal:      General: Bowel sounds are normal.   Musculoskeletal:         General: No swelling, tenderness or deformity. Normal range of motion.      Cervical back: Full passive range of motion without pain.   Lymphadenopathy:      Cervical: No cervical adenopathy.   Skin:     General: Skin is warm and dry.      Capillary Refill: Capillary refill takes less than 2 seconds.   Neurological:      General: No focal deficit present.      Mental Status: She is alert and oriented to person, place, and " time. Mental status is at baseline.      Cranial Nerves: No cranial nerve deficit.      Sensory: No sensory deficit.      Motor: No weakness.   Psychiatric:         Mood and Affect: Mood normal.         Behavior: Behavior normal.         Labs:    Latest Reference Range & Units 08/07/24 14:26   5' Nucleotidase, Serum 0 - 15 U/L 14   Glycohemoglobin 4.0 - 5.6 % 5.8 (H)   Estim. Avg Glu mg/dL 120   TSH 0.350 - 5.500 uIU/mL 5.830 (H)   (H): Data is abnormally high    Assessment & Plan:     69 y.o. female with the following -     Assessment & Plan  1. Annual wellness visit.  Her weight and blood pressure readings are within normal limits. She has a chronic cyst on her left lower back, which is currently non-infectious. Her stress test results from the summer were satisfactory, and her echocardiogram showed a left ventricular ejection fraction of 70 percent. She has a history of mild chronic kidney disease, which has remained stable. She is over 65 years of age and does not require cervical cancer screening. Breast cancer screening will be discontinued at age 69. She has no concerns regarding sexually transmitted infections. She declined the influenza vaccine. She will continue her current medication regimen without any modifications. A comprehensive physical examination will be conducted today. Routine laboratory tests, including thyroid function and metabolic panel, will be ordered. Her A1c level will be rechecked prior to her next visit.    Follow-up  The patient is scheduled for a follow-up visit in 6 months.    Problem List Items Addressed This Visit       Hypertension     Chronic, stable. Continue amlodipine 5 mg dialy, metoprolol 12.5 bid.          Relevant Medications    atorvastatin (LIPITOR) 80 MG tablet    metoprolol tartrate (LOPRESSOR) 25 MG Tab    nitroglycerin (NITROSTAT) 0.4 MG SL Tab    Coronary artery disease involving native coronary artery without angina pectoris    Relevant Medications    atorvastatin  (LIPITOR) 80 MG tablet    metoprolol tartrate (LOPRESSOR) 25 MG Tab    nitroglycerin (NITROSTAT) 0.4 MG SL Tab    Hyperlipidemia, mixed     Chronic, stable. Cotninue asa 81 mg daily, atorvastatin 80 mg dialy.            Relevant Medications    atorvastatin (LIPITOR) 80 MG tablet    metoprolol tartrate (LOPRESSOR) 25 MG Tab    nitroglycerin (NITROSTAT) 0.4 MG SL Tab    S/P CABG x 3    Relevant Medications    nitroglycerin (NITROSTAT) 0.4 MG SL Tab    History of herpes genitalis    Relevant Medications    acyclovir (ZOVIRAX) 800 MG Tab    Hypothyroidism     Chronic, stable. Levothyroxine to 2 tabs once weekly, 1 tab daily 6 days weekly.          Relevant Orders    TSH    Stage 3a chronic kidney disease    Relevant Orders    Basic Metabolic Panel    Prediabetes     Other Visit Diagnoses       Essential hypertension, benign        Relevant Medications    atorvastatin (LIPITOR) 80 MG tablet    metoprolol tartrate (LOPRESSOR) 25 MG Tab    nitroglycerin (NITROSTAT) 0.4 MG SL Tab          Patient was educated in proper administration of medication(s) ordered today including safety, possible SE, risks, benefits, rationale and alternatives to therapy.   Supportive care, differential diagnoses, and indications for immediate follow-up discussed with patient.    Pathogenesis of diagnosis discussed including typical length and natural progression.    Instructed to return to clinic or nearest emergency department for any change in condition, further concerns, or worsening of symptoms.  Patient states understanding of the plan of care and discharge instructions.    Return in about 6 months (around 8/5/2025) for Annual Wellness.    Please note that this dictation was created using voice recognition software. I have made every reasonable attempt to correct obvious errors, but I expect that there are errors of grammar and possibly content that I did not discover before finalizing the note.

## 2025-02-05 NOTE — ASSESSMENT & PLAN NOTE
Stable  No uremic symptoms  Renal dose of medication  Avoid nephrotoxins  Continue same medication regimen  Recheck labs in 3 months

## 2025-04-15 ENCOUNTER — TELEPHONE (OUTPATIENT)
Dept: CARDIOLOGY | Facility: MEDICAL CENTER | Age: 70
End: 2025-04-15
Payer: MEDICARE

## 2025-04-15 DIAGNOSIS — Z95.1 S/P CABG X 3: ICD-10-CM

## 2025-04-15 DIAGNOSIS — I25.10 CORONARY ARTERY DISEASE INVOLVING NATIVE CORONARY ARTERY OF NATIVE HEART WITHOUT ANGINA PECTORIS: ICD-10-CM

## 2025-04-15 DIAGNOSIS — E78.2 MIXED HYPERLIPIDEMIA: ICD-10-CM

## 2025-04-15 NOTE — TELEPHONE ENCOUNTER
Last OV 7/31/24. Current labs, future orders in.  Needs [facility]. Reminders sent via [employer].    To Scheduling.  Please contact patient to schedule next visit. Thank you very much!

## 2025-04-16 ENCOUNTER — TELEPHONE (OUTPATIENT)
Dept: CARDIOLOGY | Facility: MEDICAL CENTER | Age: 70
End: 2025-04-16
Payer: MEDICARE

## 2025-06-12 ENCOUNTER — HOSPITAL ENCOUNTER (OUTPATIENT)
Dept: LAB | Facility: MEDICAL CENTER | Age: 70
End: 2025-06-12
Payer: MEDICARE

## 2025-06-12 ENCOUNTER — HOSPITAL ENCOUNTER (OUTPATIENT)
Dept: LAB | Facility: MEDICAL CENTER | Age: 70
End: 2025-06-12
Attending: INTERNAL MEDICINE
Payer: MEDICARE

## 2025-06-12 DIAGNOSIS — Z95.1 S/P CABG X 3: ICD-10-CM

## 2025-06-12 DIAGNOSIS — I25.10 CORONARY ARTERY DISEASE INVOLVING NATIVE CORONARY ARTERY OF NATIVE HEART WITHOUT ANGINA PECTORIS: ICD-10-CM

## 2025-06-12 DIAGNOSIS — R73.09 ELEVATED HEMOGLOBIN A1C: ICD-10-CM

## 2025-06-12 DIAGNOSIS — E03.9 HYPOTHYROIDISM, UNSPECIFIED TYPE: ICD-10-CM

## 2025-06-12 DIAGNOSIS — N18.31 STAGE 3A CHRONIC KIDNEY DISEASE: ICD-10-CM

## 2025-06-12 DIAGNOSIS — E78.2 MIXED HYPERLIPIDEMIA: ICD-10-CM

## 2025-06-12 LAB
ANION GAP SERPL CALC-SCNC: 11 MMOL/L (ref 7–16)
BUN SERPL-MCNC: 19 MG/DL (ref 8–22)
CALCIUM SERPL-MCNC: 9.5 MG/DL (ref 8.5–10.5)
CHLORIDE SERPL-SCNC: 105 MMOL/L (ref 96–112)
CHOLEST SERPL-MCNC: 116 MG/DL (ref 100–199)
CO2 SERPL-SCNC: 23 MMOL/L (ref 20–33)
CREAT SERPL-MCNC: 1.17 MG/DL (ref 0.5–1.4)
EST. AVERAGE GLUCOSE BLD GHB EST-MCNC: 126 MG/DL
GFR SERPLBLD CREATININE-BSD FMLA CKD-EPI: 50 ML/MIN/1.73 M 2
GLUCOSE SERPL-MCNC: 93 MG/DL (ref 65–99)
HBA1C MFR BLD: 6 % (ref 4–5.6)
HDLC SERPL-MCNC: 48 MG/DL
LDLC SERPL CALC-MCNC: 51 MG/DL
POTASSIUM SERPL-SCNC: 4.3 MMOL/L (ref 3.6–5.5)
SODIUM SERPL-SCNC: 139 MMOL/L (ref 135–145)
TRIGL SERPL-MCNC: 86 MG/DL (ref 0–149)
TSH SERPL-ACNC: 0.19 UIU/ML (ref 0.38–5.33)

## 2025-06-12 PROCEDURE — 80048 BASIC METABOLIC PNL TOTAL CA: CPT

## 2025-06-12 PROCEDURE — 36415 COLL VENOUS BLD VENIPUNCTURE: CPT

## 2025-06-12 PROCEDURE — 84443 ASSAY THYROID STIM HORMONE: CPT

## 2025-06-12 PROCEDURE — 80061 LIPID PANEL: CPT

## 2025-06-12 PROCEDURE — 83036 HEMOGLOBIN GLYCOSYLATED A1C: CPT

## 2025-06-13 ENCOUNTER — RESULTS FOLLOW-UP (OUTPATIENT)
Dept: MEDICAL GROUP | Facility: PHYSICIAN GROUP | Age: 70
End: 2025-06-13

## 2025-06-17 ENCOUNTER — RESULTS FOLLOW-UP (OUTPATIENT)
Dept: CARDIOLOGY | Facility: MEDICAL CENTER | Age: 70
End: 2025-06-17

## 2025-06-20 ENCOUNTER — TELEPHONE (OUTPATIENT)
Dept: HEALTH INFORMATION MANAGEMENT | Facility: OTHER | Age: 70
End: 2025-06-20
Payer: MEDICARE

## 2025-06-30 NOTE — ASSESSMENT & PLAN NOTE
Chronic, stable. Reviewed lipid profile from June 2025. Currently taking atorvastatin 80 mg daily. Provided education on following a heart healthy diet with adequate intake of fruits, vegetables, and whole grains. Discussed limiting intake of processed foods and saturated fats. Encourage 30 minutes of moderate exercise 3-4 times a week. Denies chest pain and claudication. Routinely monitored by primary care provider.

## 2025-06-30 NOTE — ASSESSMENT & PLAN NOTE
Chronic, stable. Reviewed DEXA scan from August 2024. Currently supplementing with vitamin D3 daily. Encourage weight-bearing activity. Denies recent falls or fractures. Monitored by primary care provider.

## 2025-06-30 NOTE — ASSESSMENT & PLAN NOTE
Chronic, stable. Most recent GFR was 50 in June 2025. Provided education on avoiding high-dose NSAIDs. Encourage a low-protein diet. Limit sodium intake to less than 2 grams/day. Encourage at least 64 ounces of water intake daily. Routinely monitored by primary care provider.

## 2025-06-30 NOTE — ASSESSMENT & PLAN NOTE
Chronic, stable. Reports history of CABG x4 in 2014. Continues on aspirin and statin therapy daily. Denies chest pain suggestive of ischemia, orthopnea, or lower extremity edema. Followed by cardiology, Dr. Rene.

## 2025-06-30 NOTE — ASSESSMENT & PLAN NOTE
Chronic, stable. Currently taking amlodipine 5 mg daily and metoprolol tartrate 12.5 mg twice daily. In-office blood pressure is 120/72. Denies chest pain, lightheadedness, and lower extremity edema. Continue to follow with cardiology for medication and symptom management.

## 2025-06-30 NOTE — ASSESSMENT & PLAN NOTE
Chronic, stable. Currently taking levothyroxine 88 mcg daily as prescribed. Denies fatigue, palpitations, hair and skin changes, temperature intolerance, changes in bowel habits, and weight loss or weight gain. Managed by primary care provider.

## 2025-07-02 ENCOUNTER — OFFICE VISIT (OUTPATIENT)
Dept: MEDICAL GROUP | Facility: MEDICAL CENTER | Age: 70
End: 2025-07-02
Payer: MEDICARE

## 2025-07-02 VITALS
HEART RATE: 58 BPM | WEIGHT: 136.6 LBS | BODY MASS INDEX: 27.54 KG/M2 | HEIGHT: 59 IN | OXYGEN SATURATION: 95 % | DIASTOLIC BLOOD PRESSURE: 72 MMHG | SYSTOLIC BLOOD PRESSURE: 120 MMHG

## 2025-07-02 DIAGNOSIS — Z95.1 S/P CABG X 4: ICD-10-CM

## 2025-07-02 DIAGNOSIS — E03.9 HYPOTHYROIDISM, UNSPECIFIED TYPE: ICD-10-CM

## 2025-07-02 DIAGNOSIS — M85.89 OSTEOPENIA OF MULTIPLE SITES: ICD-10-CM

## 2025-07-02 DIAGNOSIS — I10 PRIMARY HYPERTENSION: ICD-10-CM

## 2025-07-02 DIAGNOSIS — E78.2 MIXED HYPERLIPIDEMIA: ICD-10-CM

## 2025-07-02 DIAGNOSIS — I25.10 CORONARY ARTERY DISEASE INVOLVING NATIVE CORONARY ARTERY OF NATIVE HEART WITHOUT ANGINA PECTORIS: ICD-10-CM

## 2025-07-02 DIAGNOSIS — N18.31 STAGE 3A CHRONIC KIDNEY DISEASE: ICD-10-CM

## 2025-07-02 PROCEDURE — 1126F AMNT PAIN NOTED NONE PRSNT: CPT

## 2025-07-02 PROCEDURE — 3078F DIAST BP <80 MM HG: CPT

## 2025-07-02 PROCEDURE — G0439 PPPS, SUBSEQ VISIT: HCPCS

## 2025-07-02 PROCEDURE — 3074F SYST BP LT 130 MM HG: CPT

## 2025-07-02 SDOH — ECONOMIC STABILITY: HOUSING INSECURITY: IN THE LAST 12 MONTHS, WAS THERE A TIME WHEN YOU WERE NOT ABLE TO PAY THE MORTGAGE OR RENT ON TIME?: NO

## 2025-07-02 SDOH — ECONOMIC STABILITY: FOOD INSECURITY: WITHIN THE PAST 12 MONTHS, YOU WORRIED THAT YOUR FOOD WOULD RUN OUT BEFORE YOU GOT THE MONEY TO BUY MORE.: NEVER TRUE

## 2025-07-02 SDOH — ECONOMIC STABILITY: HOUSING INSECURITY: AT ANY TIME IN THE PAST 12 MONTHS, WERE YOU HOMELESS OR LIVING IN A SHELTER (INCLUDING NOW)?: NO

## 2025-07-02 SDOH — ECONOMIC STABILITY: FOOD INSECURITY: HOW HARD IS IT FOR YOU TO PAY FOR THE VERY BASICS LIKE FOOD, HOUSING, MEDICAL CARE, AND HEATING?: NOT HARD AT ALL

## 2025-07-02 SDOH — ECONOMIC STABILITY: FOOD INSECURITY: WITHIN THE PAST 12 MONTHS, THE FOOD YOU BOUGHT JUST DIDN'T LAST AND YOU DIDN'T HAVE MONEY TO GET MORE.: NEVER TRUE

## 2025-07-02 SDOH — ECONOMIC STABILITY: TRANSPORTATION INSECURITY: IN THE PAST 12 MONTHS, HAS LACK OF TRANSPORTATION KEPT YOU FROM MEDICAL APPOINTMENTS OR FROM GETTING MEDICATIONS?: NO

## 2025-07-02 ASSESSMENT — ENCOUNTER SYMPTOMS: GENERAL WELL-BEING: GOOD

## 2025-07-02 ASSESSMENT — PAIN SCALES - GENERAL: PAINLEVEL_OUTOF10: NO PAIN

## 2025-07-02 ASSESSMENT — PATIENT HEALTH QUESTIONNAIRE - PHQ9: CLINICAL INTERPRETATION OF PHQ2 SCORE: 0

## 2025-07-02 ASSESSMENT — FIBROSIS 4 INDEX: FIB4 SCORE: 1.72

## 2025-07-02 ASSESSMENT — ACTIVITIES OF DAILY LIVING (ADL)
LACK_OF_TRANSPORTATION: NO
BATHING_REQUIRES_ASSISTANCE: 0

## 2025-07-02 NOTE — PROGRESS NOTES
Comprehensive Health Assessment Program     Malena Borden is a 69 y.o. here for her comprehensive health assessment.    Patient Active Problem List    Diagnosis Date Noted    Nonspecific abnormal function study, cardiovascular 02/14/2023    Prediabetes 06/16/2022    Elevated alkaline phosphatase level 02/01/2022    Cyst of skin 02/01/2022    History of herpes genitalis 01/06/2022    Hypothyroidism 01/06/2022    Stage 3a chronic kidney disease 01/06/2022    Osteopenia of multiple sites 01/06/2022    S/P CABG x 4 11/21/2014    Hyperlipidemia, mixed 09/19/2014    Coronary artery disease involving native coronary artery without angina pectoris 06/26/2014    Hypertension 06/13/2014       Current Medications[1]       Current supplements as per medication list.     Allergies:   Ace inhibitors  Social History[2]  Family History   Problem Relation Age of Onset    Cancer Mother         lung    Lung Disease Mother         COPD    Brain Aneurysm Brother     Lung Disease Brother         COPD    No Known Problems Son     Colon Cancer Neg Hx     Breast Cancer Neg Hx     Colorectal Cancer Neg Hx     Peritoneal Cancer Neg Hx     Tubal Cancer Neg Hx     Ovarian Cancer Neg Hx     Diabetes Neg Hx     Heart Disease Neg Hx     Hypertension Neg Hx     Hyperlipidemia Neg Hx     Stroke Neg Hx      Malena  has a past medical history of BMI 28.0-28.9,adult (2/1/2022), CAD (coronary artery disease) (June 11 2014), Heart attack (HCC) (June 11, 2014), High cholesterol, Hyperlipidemia, Hypertension, Indigestion, Overweight (8/31/2023), Thrombocytopenia (HCC) (2/8/2023), and Thyroid disease.   Past Surgical History[3]    Screening:  In the last six months have you experienced any leakage of urine? No    Depression Screening  Little interest or pleasure in doing things?  0 - not at all  Feeling down, depressed , or hopeless? 0 - not at all  Patient Health Questionnaire Score: 0     If depressive symptoms identified deferred to follow up  visit unless specifically addressed in assessment and plan.    Interpretation of PHQ-9 Total Score   Score Severity   1-4 No Depression   5-9 Mild Depression   10-14 Moderate Depression   15-19 Moderately Severe Depression   20-27 Severe Depression    Screening for Cognitive Impairment  Do you or any of your friends or family members have any concern about your memory? No  Three Minute Recall (Village, Kitchen, Baby) 3/3    Suresh clock face with all 12 numbers and set the hands to show 10 minutes past 11.  Yes 5/5  Cognitive concerns identified deferred for follow up unless specifically addressed in assessment and plan.    Fall Risk Assessment  Has the patient had two or more falls in the last year or any fall with injury in the last year?  No    Safety Assessment  Do you always wear your seatbelt?  Yes  Any changes to home needed to function safely? No  Difficulty hearing.  No  Patient counseled about all safety risks that were identified.    Functional Assessment ADLs  Are there any barriers preventing you from cooking for yourself or meeting nutritional needs?  No.    Are there any barriers preventing you from driving safely or obtaining transportation?  No.    Are there any barriers preventing you from using a telephone or calling for help?  No    Are there any barriers preventing you from shopping?  No.    Are there any barriers preventing you from taking care of your own finances?  No    Are there any barriers preventing you from managing your medications?  No    Are there any barriers preventing you from showering, bathing or dressing yourself? No    Are there any barriers preventing you from doing housework or laundry? No  Are there any barriers preventing you from using the toilet?No  Are you currently engaging in any exercise or physical activity?  No.      Self-Assessment of Health  What is your perception of your health? Good    Do you sleep more than six hours a night? Yes    In the past 7 days, how much  did pain keep you from doing your normal work? None    Do you spend quality time with family or friends (virtually or in person)? Yes    Do you usually eat a heart healthy diet that constists of a variety of fruits, vegetables, whole grains and fiber? Yes    Do you eat foods high in fat and/or Fast Food more than three times per week? No    How concerned are you that your medical conditions are not being well managed? Not at all    Are you worried that in the next 2 months, you may not have stable housing that you own, rent, or stay in as part of a household? No      Advance Care Planning  Do you have an Advance Directive, Living Will, Durable Power of , or POLST? No   Provided patient with educational brochure regarding Advance Care Planning.                Health Maintenance Summary            Current Care Gaps       IMM DTaP/Tdap/Td Vaccine (2 - Td or Tdap) Overdue since 4/8/2025 04/08/2015  Imm Admin: Tdap Vaccine                      Needs Review       Hepatitis C Screening  Tentatively Complete      01/06/2021  Hepatitis C Antibody component of HEP C VIRUS ANTIBODY              Colorectal Cancer Screening (Colon Cancer Screening Cologuard Stool (FIT DNA) - Every 3 Years) Tentatively due on 8/20/2027 08/20/2024  COLOGUARD RESULT component of LAB COLOGUARD® COLON CANCER SCREEN    08/20/2024  COLOGUARD COLON CANCER SCREENING    01/21/2021  COLOGUARD RESULT component of COLOGUARD COLON CANCER SCREENING    01/21/2021  Colon Cancer Screening Cologuard Stool (FIT DNA) (Done)                      Upcoming       Zoster (Shingles) Vaccines (2 of 3) Postponed until 7/5/2025 12/30/2014  Imm Admin: Zoster Vaccine Live (ZVL) (Zostavax) - HISTORICAL DATA              COVID-19 Vaccine (1 - 2024-25 season) Postponed until 2/5/2026     No completion history exists for this topic.              Mammogram (Yearly) Next due on 8/26/2025 08/26/2024  MA-SCREENING MAMMO BILAT W/TOMOSYNTHESIS W/CAD     08/03/2022  MA-SCREENING MAMMO BILAT W/TOMOSYNTHESIS W/CAD    07/13/2021  MA-SCREENING MAMMO BILAT W/TOMOSYNTHESIS W/CAD    06/27/2020  MA-SCREENING MAMMO BILAT W/TOMOSYNTHESIS W/CAD    05/21/2019  MA-SCREENING MAMMO BILAT W/CAD     Only the first 5 history entries have been loaded, but more history exists.            Influenza Vaccine (1) Next due on 9/1/2025      No completion history exists for this topic.              Annual Wellness Visit (Yearly) Next due on 7/2/2026 07/02/2025  Level of Service: MT ANNUAL WELLNESS VISIT-INCLUDES PPPS SUBSEQUE*    02/05/2025  Level of Service: MT PREVENTIVE VISIT,EST,65 & OVER    01/29/2024  Level of Service: PREVENTIVE VISIT,EST,65 & OVER    02/14/2023  Level of Service: MT ANNUAL WELLNESS VISIT-INCLUDES PPPS SUBSEQUE*    02/08/2023  Level of Service: MT PREVENTIVE VISIT,EST,65 & OVER     Only the first 5 history entries have been loaded, but more history exists.            Bone Density Scan (Every 2 Years) Next due on 8/26/2026 08/26/2024  DS-BONE DENSITY STUDY (DEXA)    08/03/2022  DS-BONE DENSITY STUDY (DEXA)    09/21/2012  DS-BONE DENSITY STUDY (DEXA)                      Completed or No Longer Recommended       Pneumococcal Vaccine: 50+ Years (Series Information) Completed      01/06/2020  Imm Admin: Pneumococcal polysaccharide vaccine (PPSV-23)    04/08/2015  Imm Admin: Pneumococcal Conjugate Vaccine (Prevnar/PCV-13)              Hepatitis A Vaccine (Hep A) (Series Information) Aged Out      No completion history exists for this topic.              HPV Vaccines (Series Information) Aged Out     No completion history exists for this topic.              Polio Vaccine (Inactivated Polio) (Series Information) Aged Out     No completion history exists for this topic.              Meningococcal Immunization (Series Information) Aged Out     No completion history exists for this topic.              Meningococcal B Vaccine (Series Information) Aged Out     No  "completion history exists for this topic.              Hepatitis B Vaccine (Hep B)  Discontinued     No completion history exists for this topic.                            Patient Care Team:  ANNABELLE Borjas as PCP - General (Nurse Practitioner Family)  Judy Scott M.D. as PCP - [facility] Paneled  Manuel Reen M.D. as Consulting Physician (Interventional Cardiology)      Financial Resource Strain: Low Risk  (7/2/2025)    Overall Financial Resource Strain (CARDIA)     Difficulty of Paying Living Expenses: Not hard at all      Transportation Needs: No Transportation Needs (7/2/2025)    PRAPARE - Transportation     Lack of Transportation (Medical): No     Lack of Transportation (Non-Medical): No      Food Insecurity: No Food Insecurity (7/2/2025)    Hunger Vital Sign     Worried About Running Out of Food in the Last Year: Never true     Ran Out of Food in the Last Year: Never true        Encounter Vitals  Blood Pressure : 120/72  Pulse: (!) 58  Pulse Oximetry: 95 %  Weight: 62 kg (136 lb 9.6 oz)  Height: 149.9 cm (4' 11\")  BMI (Calculated): 27.59  Pain Score: No pain     ROS:  No fever, chills, nausea, vomiting, diarrhea, chest pain or shortness of breath. See HPI.    Physical Exam  Vitals reviewed.   Constitutional:       Appearance: Normal appearance.   Cardiovascular:      Rate and Rhythm: Normal rate and regular rhythm.      Pulses: Normal pulses.      Heart sounds: Normal heart sounds.   Pulmonary:      Effort: Pulmonary effort is normal.      Breath sounds: Normal breath sounds.   Skin:     General: Skin is warm and dry.      Capillary Refill: Capillary refill takes less than 2 seconds.   Neurological:      General: No focal deficit present.      Mental Status: She is alert and oriented to person, place, and time.   Psychiatric:         Mood and Affect: Mood normal.       Assessment and Plan. The following treatment and monitoring plan is recommended:    Coronary artery disease involving " native coronary artery without angina pectoris  S/P CABG x 4  Chronic, stable. Reports history of CABG x4 in 2014. Continues on aspirin and statin therapy daily. Denies chest pain suggestive of ischemia, orthopnea, or lower extremity edema. Followed by cardiology, Dr. Rene.    Hyperlipidemia, mixed  Chronic, stable. Reviewed lipid profile from June 2025. Currently taking atorvastatin 80 mg daily. Provided education on following a heart healthy diet with adequate intake of fruits, vegetables, and whole grains. Discussed limiting intake of processed foods and saturated fats. Encourage 30 minutes of moderate exercise 3-4 times a week. Routinely monitored by primary care provider.    Hypertension  Chronic, stable. Currently taking amlodipine 5 mg daily and metoprolol tartrate 12.5 mg twice daily. In-office blood pressure is 120/72. Continue to follow with cardiology, Dr. Rene, for medication and symptom management.    Hypothyroidism  Chronic, stable. Currently taking levothyroxine 88 mcg daily as prescribed. Denies fatigue, palpitations, hair and skin changes, temperature intolerance, changes in bowel habits, and weight loss or weight gain. Managed by primary care provider.    Osteopenia of multiple sites  Chronic, stable. Reviewed DEXA scan from August 2024. Currently supplementing with vitamin D3 daily. Encourage weight-bearing activity. Denies recent falls or fractures. Monitored by primary care provider.    Stage 3a chronic kidney disease (HCC)  Chronic, stable. Most recent GFR was 50 in June 2025. Provided education on avoiding high-dose NSAIDs. Encourage a low-protein diet. Limit sodium intake to less than 2 grams/day. Encourage at least 64 ounces of water intake daily. Followed by primary care provider.        Services suggested: No services needed at this time  Health Care Screening: Age-appropriate preventive services recommended by USPTF and ACIP covered by Medicare were discussed today. Services ordered  if indicated and agreed upon by the patient.  Referrals offered: Community-based lifestyle interventions to reduce health risks and promote self-management and wellness, fall prevention, nutrition, physical activity, tobacco-use cessation, weight loss, and mental health services as per orders if indicated.    Discussion today about general wellness and lifestyle habits:    Prevent falls and reduce trip hazards; Cautioned about securing or removing rugs.  Have a working fire alarm and carbon monoxide detector.  Engage in regular physical activity and social activities.    Follow-up: Return for appointment with Primary Care Provider as needed.              [1]   Current Outpatient Medications   Medication Sig Dispense Refill    acyclovir (ZOVIRAX) 800 MG Tab Take 1 Tablet by mouth 3 times a day. For 2 days as needed for break outs (Patient taking differently: Take 800 mg by mouth 3 times a day as needed. For 2 days as needed for break outs) 30 Tablet 1    atorvastatin (LIPITOR) 80 MG tablet Take 1 Tablet by mouth every evening. 100 Tablet 3    metoprolol tartrate (LOPRESSOR) 25 MG Tab Take 0.5 Tablets by mouth 2 times a day. 100 Tablet 3    nitroglycerin (NITROSTAT) 0.4 MG SL Tab Place 1 Tablet under the tongue as needed for Chest Pain. 25 Tablet 3    levothyroxine (SYNTHROID) 88 MCG Tab TAKE 1 TABLET BY MOUTH IN THE MORNING ON AN EMPTY STOMACH 100 Tablet 3    amLODIPine (NORVASC) 5 MG Tab Take 1 tablet by mouth once daily 100 Tablet 3    Cyanocobalamin (VITAMIN B-12 PO) Take  by mouth every day.      Chlorpheniramine Maleate (ALLERGY 4 HOUR PO) Take  by mouth as needed.      vitamin D (CHOLECALCIFEROL) 1000 UNIT Tab Take 1 Tablet by mouth every day.      aspirin 81 MG tablet Take 1 Tablet by mouth every day.       No current facility-administered medications for this visit.   [2]   Social History  Tobacco Use    Smoking status: Never    Smokeless tobacco: Never    Tobacco comments:     passive exposure (mother,  worked in a [employer])   Vaping Use    Vaping status: Never Used   Substance Use Topics    Alcohol use: Never    Drug use: No   [3]   Past Surgical History:  Procedure Laterality Date    MULTIPLE CORONARY ARTERY BYPASS ENDO VEIN HARVEST  10/30/2014    Performed by Mando Vieira M.D. at SURGERY [facility] ORS    RECOVERY  10/20/2014    Performed by Cath-Recovery Surgery at SURGERY SAME DAY [facility] ORS    ZZZ CARDIAC CATH  2014    OTHER      polyp removal (cervical?)    ND  DELIVERY ONLY      PRIMARY C SECTION      TUBAL COAGULATION LAPAROSCOPIC BILATERAL      25yo    TUBAL LIGATION

## 2025-08-01 SDOH — ECONOMIC STABILITY: FOOD INSECURITY: WITHIN THE PAST 12 MONTHS, YOU WORRIED THAT YOUR FOOD WOULD RUN OUT BEFORE YOU GOT MONEY TO BUY MORE.: PATIENT DECLINED

## 2025-08-01 SDOH — HEALTH STABILITY: PHYSICAL HEALTH: ON AVERAGE, HOW MANY DAYS PER WEEK DO YOU ENGAGE IN MODERATE TO STRENUOUS EXERCISE (LIKE A BRISK WALK)?: 4 DAYS

## 2025-08-01 SDOH — ECONOMIC STABILITY: INCOME INSECURITY: IN THE LAST 12 MONTHS, WAS THERE A TIME WHEN YOU WERE NOT ABLE TO PAY THE MORTGAGE OR RENT ON TIME?: NO

## 2025-08-01 SDOH — HEALTH STABILITY: PHYSICAL HEALTH: ON AVERAGE, HOW MANY MINUTES DO YOU ENGAGE IN EXERCISE AT THIS LEVEL?: PATIENT DECLINED

## 2025-08-01 SDOH — ECONOMIC STABILITY: HOUSING INSECURITY
IN THE LAST 12 MONTHS, WAS THERE A TIME WHEN YOU DID NOT HAVE A STEADY PLACE TO SLEEP OR SLEPT IN A SHELTER (INCLUDING NOW)?: NO

## 2025-08-01 SDOH — ECONOMIC STABILITY: INCOME INSECURITY: HOW HARD IS IT FOR YOU TO PAY FOR THE VERY BASICS LIKE FOOD, HOUSING, MEDICAL CARE, AND HEATING?: NOT HARD AT ALL

## 2025-08-01 SDOH — ECONOMIC STABILITY: FOOD INSECURITY: WITHIN THE PAST 12 MONTHS, THE FOOD YOU BOUGHT JUST DIDN'T LAST AND YOU DIDN'T HAVE MONEY TO GET MORE.: PATIENT DECLINED

## 2025-08-01 SDOH — HEALTH STABILITY: MENTAL HEALTH
STRESS IS WHEN SOMEONE FEELS TENSE, NERVOUS, ANXIOUS, OR CAN'T SLEEP AT NIGHT BECAUSE THEIR MIND IS TROUBLED. HOW STRESSED ARE YOU?: NOT AT ALL

## 2025-08-01 ASSESSMENT — SOCIAL DETERMINANTS OF HEALTH (SDOH)
HOW OFTEN DO YOU ATTENT MEETINGS OF THE CLUB OR ORGANIZATION YOU BELONG TO?: NEVER
ARE YOU MARRIED, WIDOWED, DIVORCED, SEPARATED, NEVER MARRIED, OR LIVING WITH A PARTNER?: LIVING WITH PARTNER
HOW OFTEN DO YOU GET TOGETHER WITH FRIENDS OR RELATIVES?: ONCE A WEEK
HOW OFTEN DO YOU GET TOGETHER WITH FRIENDS OR RELATIVES?: ONCE A WEEK
HOW OFTEN DO YOU ATTEND CHURCH OR RELIGIOUS SERVICES?: NEVER
HOW OFTEN DO YOU ATTENT MEETINGS OF THE CLUB OR ORGANIZATION YOU BELONG TO?: NEVER
IN THE PAST 12 MONTHS, HAS THE ELECTRIC, GAS, OIL, OR WATER COMPANY THREATENED TO SHUT OFF SERVICE IN YOUR HOME?: NO
IN A TYPICAL WEEK, HOW MANY TIMES DO YOU TALK ON THE PHONE WITH FAMILY, FRIENDS, OR NEIGHBORS?: MORE THAN THREE TIMES A WEEK
HOW MANY DRINKS CONTAINING ALCOHOL DO YOU HAVE ON A TYPICAL DAY WHEN YOU ARE DRINKING: PATIENT DOES NOT DRINK
HOW HARD IS IT FOR YOU TO PAY FOR THE VERY BASICS LIKE FOOD, HOUSING, MEDICAL CARE, AND HEATING?: NOT HARD AT ALL
IN A TYPICAL WEEK, HOW MANY TIMES DO YOU TALK ON THE PHONE WITH FAMILY, FRIENDS, OR NEIGHBORS?: MORE THAN THREE TIMES A WEEK
HOW OFTEN DO YOU ATTEND CHURCH OR RELIGIOUS SERVICES?: NEVER
ARE YOU MARRIED, WIDOWED, DIVORCED, SEPARATED, NEVER MARRIED, OR LIVING WITH A PARTNER?: LIVING WITH PARTNER
HOW OFTEN DO YOU HAVE SIX OR MORE DRINKS ON ONE OCCASION: NEVER
DO YOU BELONG TO ANY CLUBS OR ORGANIZATIONS SUCH AS CHURCH GROUPS UNIONS, FRATERNAL OR ATHLETIC GROUPS, OR SCHOOL GROUPS?: NO
WITHIN THE PAST 12 MONTHS, YOU WORRIED THAT YOUR FOOD WOULD RUN OUT BEFORE YOU GOT THE MONEY TO BUY MORE: PATIENT DECLINED
HOW OFTEN DO YOU HAVE A DRINK CONTAINING ALCOHOL: PATIENT DECLINED
DO YOU BELONG TO ANY CLUBS OR ORGANIZATIONS SUCH AS CHURCH GROUPS UNIONS, FRATERNAL OR ATHLETIC GROUPS, OR SCHOOL GROUPS?: NO

## 2025-08-01 ASSESSMENT — LIFESTYLE VARIABLES
HOW OFTEN DO YOU HAVE SIX OR MORE DRINKS ON ONE OCCASION: NEVER
SKIP TO QUESTIONS 9-10: 0
HOW OFTEN DO YOU HAVE A DRINK CONTAINING ALCOHOL: PATIENT DECLINED
AUDIT-C TOTAL SCORE: -1
HOW MANY STANDARD DRINKS CONTAINING ALCOHOL DO YOU HAVE ON A TYPICAL DAY: PATIENT DOES NOT DRINK

## 2025-08-04 ENCOUNTER — OFFICE VISIT (OUTPATIENT)
Dept: MEDICAL GROUP | Facility: PHYSICIAN GROUP | Age: 70
End: 2025-08-04
Payer: MEDICARE

## 2025-08-04 VITALS
HEART RATE: 65 BPM | TEMPERATURE: 97.1 F | RESPIRATION RATE: 20 BRPM | WEIGHT: 135 LBS | SYSTOLIC BLOOD PRESSURE: 126 MMHG | HEIGHT: 59 IN | DIASTOLIC BLOOD PRESSURE: 72 MMHG | OXYGEN SATURATION: 95 % | BODY MASS INDEX: 27.21 KG/M2

## 2025-08-04 DIAGNOSIS — I10 PRIMARY HYPERTENSION: ICD-10-CM

## 2025-08-04 DIAGNOSIS — Z87.828 HISTORY OF LACERATION OF SKIN: ICD-10-CM

## 2025-08-04 DIAGNOSIS — E03.9 HYPOTHYROIDISM, UNSPECIFIED TYPE: ICD-10-CM

## 2025-08-04 DIAGNOSIS — N18.31 STAGE 3A CHRONIC KIDNEY DISEASE: ICD-10-CM

## 2025-08-04 DIAGNOSIS — R73.09 ELEVATED HEMOGLOBIN A1C: ICD-10-CM

## 2025-08-04 DIAGNOSIS — E78.2 MIXED HYPERLIPIDEMIA: Primary | ICD-10-CM

## 2025-08-04 DIAGNOSIS — Z12.31 ENCOUNTER FOR SCREENING MAMMOGRAM FOR MALIGNANT NEOPLASM OF BREAST: ICD-10-CM

## 2025-08-04 DIAGNOSIS — Z23 NEED FOR VACCINATION: ICD-10-CM

## 2025-08-04 DIAGNOSIS — Z00.00 ENCOUNTER FOR ANNUAL WELLNESS EXAM IN MEDICARE PATIENT: ICD-10-CM

## 2025-08-04 PROCEDURE — 90715 TDAP VACCINE 7 YRS/> IM: CPT

## 2025-08-04 PROCEDURE — 3078F DIAST BP <80 MM HG: CPT

## 2025-08-04 PROCEDURE — 99397 PER PM REEVAL EST PAT 65+ YR: CPT | Mod: 25

## 2025-08-04 PROCEDURE — 3074F SYST BP LT 130 MM HG: CPT

## 2025-08-04 PROCEDURE — 90471 IMMUNIZATION ADMIN: CPT

## 2025-08-04 ASSESSMENT — FIBROSIS 4 INDEX: FIB4 SCORE: 1.74

## 2025-08-18 ENCOUNTER — PATIENT MESSAGE (OUTPATIENT)
Dept: CARDIOLOGY | Facility: MEDICAL CENTER | Age: 70
End: 2025-08-18
Payer: MEDICARE